# Patient Record
Sex: FEMALE | Race: WHITE | NOT HISPANIC OR LATINO | Employment: FULL TIME | URBAN - METROPOLITAN AREA
[De-identification: names, ages, dates, MRNs, and addresses within clinical notes are randomized per-mention and may not be internally consistent; named-entity substitution may affect disease eponyms.]

---

## 2017-01-02 ENCOUNTER — APPOINTMENT (OUTPATIENT)
Dept: RADIATION ONCOLOGY | Facility: CLINIC | Age: 52
End: 2017-01-02
Payer: COMMERCIAL

## 2017-01-02 PROCEDURE — 77412 RADIATION TX DELIVERY LVL 3: CPT | Performed by: RADIOLOGY

## 2017-01-03 ENCOUNTER — APPOINTMENT (OUTPATIENT)
Dept: RADIATION ONCOLOGY | Facility: CLINIC | Age: 52
End: 2017-01-03
Payer: COMMERCIAL

## 2017-01-03 PROCEDURE — 77412 RADIATION TX DELIVERY LVL 3: CPT | Performed by: RADIOLOGY

## 2017-01-04 ENCOUNTER — ONCOLOGY VISIT (OUTPATIENT)
Dept: RADIATION ONCOLOGY | Facility: CLINIC | Age: 52
End: 2017-01-04

## 2017-01-04 ENCOUNTER — APPOINTMENT (OUTPATIENT)
Dept: RADIATION ONCOLOGY | Facility: CLINIC | Age: 52
End: 2017-01-04
Payer: COMMERCIAL

## 2017-01-04 DIAGNOSIS — Z71.81 SPIRITUAL OR RELIGIOUS COUNSELING: Primary | ICD-10-CM

## 2017-01-04 PROCEDURE — 77412 RADIATION TX DELIVERY LVL 3: CPT | Performed by: RADIOLOGY

## 2017-01-04 PROCEDURE — 77336 RADIATION PHYSICS CONSULT: CPT | Performed by: RADIOLOGY

## 2017-01-04 PROCEDURE — 77427 RADIATION TX MANAGEMENT X5: CPT | Performed by: RADIOLOGY

## 2017-01-04 NOTE — PROGRESS NOTES
SPIRITUAL HEALTH SERVICES  SPIRITUAL ASSESSMENT Progress Note  [facility]     introduced himself to Rizwana Aguirre and informed her of his availability.    Marc Gonzales M.Div., [facility]  Staff   Office tel: [phone]

## 2017-01-05 ENCOUNTER — OFFICE VISIT (OUTPATIENT)
Dept: RADIATION ONCOLOGY | Facility: CLINIC | Age: 52
End: 2017-01-05
Payer: COMMERCIAL

## 2017-01-05 ENCOUNTER — APPOINTMENT (OUTPATIENT)
Dept: RADIATION ONCOLOGY | Facility: CLINIC | Age: 52
End: 2017-01-05
Payer: COMMERCIAL

## 2017-01-05 VITALS — BODY MASS INDEX: 35.92 KG/M2 | WEIGHT: 190 LBS

## 2017-01-05 DIAGNOSIS — D05.12 DUCTAL CARCINOMA IN SITU (DCIS) OF LEFT BREAST: Primary | ICD-10-CM

## 2017-01-05 PROCEDURE — 99207 ZZC NO BILLABLE SERVICE THIS VISIT: CPT | Performed by: RADIOLOGY

## 2017-01-05 PROCEDURE — 77412 RADIATION TX DELIVERY LVL 3: CPT | Performed by: RADIOLOGY

## 2017-01-05 PROCEDURE — 77417 THER RADIOLOGY PORT IMAGE(S): CPT | Performed by: RADIOLOGY

## 2017-01-05 ASSESSMENT — PAIN SCALES - GENERAL: PAINLEVEL: NO PAIN (0)

## 2017-01-05 NOTE — PROGRESS NOTES
[facility] PHYSICIANS  SPECIALIZING IN BREAKTHROUGHS  Radiation Oncology    On Treatment Visit Note      Rizwana Aguirre      Date: 2017   MRN: [id number]   : 1965  Diagnosis: DCIS      Reason for Visit:  On Radiation Treatment Visit     Treatment Summary to Date  Treatment Site: left breast Current Dose: 1602/4255 cGy Fractions:       Chemotherapy  Chemo concurrent with radx?: No (Dr. Beltrán)    Subjective: Doing well. No issues this week. Notices some slight tenderness of breast. No other concerns.    Nursing ROS:   Nutrition Alteration  Diet Type: Patient's Preference           Cardiovascular  Respiratory effort: 1 - Normal - without distress           Pain Assessment  0-10 Pain Scale: 0    Objective:   Wt 190 lb  LMP 2016 (Approximate)  Gen: Appears well, NAD  Skin: Minimal erythema    Labs:  CBC RESULTS:   Recent Labs   Lab Test  16   0817   WBC  6.0   RBC  4.39   HGB  13.6   HCT  41.5   MCV  95   MCH  31.0   MCHC  32.8   RDW  12.7   PLT  252     ELECTROLYTES:  Recent Labs   Lab Test  16   0817   NA  139   POTASSIUM  4.2   CHLORIDE  106   VANDANA  8.9   CO2  28   BUN  18   CR  0.69   GLC  94       Assessment:    Tolerating radiation therapy well.  All questions and concerns addressed.    Plan:   1. Continue current therapy.    2. Discussed NSAIDS, cool packs for breast tenderness.      Mosaiq chart and setup information reviewed  Port images reviewed    Medication Review  Med list reviewed with patient?: Yes  Med list printed and given: Offered and declined    Educational Topic Discussed  Additional Instructions: patient performing daily ROM stretching exercises, reviewed per patient request today, denies concerns      Kelton Valdes M.D.  [facility]  Radiation Oncology    [phone] [facility]  [phone] pager

## 2017-01-05 NOTE — PATIENT INSTRUCTIONS
Please contact [facility] RN with questions or concerns following today's appointment: [phone].    Thank you!

## 2017-01-06 ENCOUNTER — APPOINTMENT (OUTPATIENT)
Dept: RADIATION ONCOLOGY | Facility: CLINIC | Age: 52
End: 2017-01-06
Payer: COMMERCIAL

## 2017-01-06 PROCEDURE — 77412 RADIATION TX DELIVERY LVL 3: CPT | Performed by: RADIOLOGY

## 2017-01-09 ENCOUNTER — ONCOLOGY VISIT (OUTPATIENT)
Dept: ONCOLOGY | Facility: CLINIC | Age: 52
End: 2017-01-09
Payer: COMMERCIAL

## 2017-01-09 DIAGNOSIS — D05.12 DUCTAL CARCINOMA IN SITU (DCIS) OF LEFT BREAST: Primary | ICD-10-CM

## 2017-01-09 DIAGNOSIS — Z80.8 FAMILY HISTORY OF MELANOMA: ICD-10-CM

## 2017-01-09 PROCEDURE — 99207 ZZC NO CHARGE LOS: CPT | Performed by: GENETIC COUNSELOR, MS

## 2017-01-09 PROCEDURE — 77412 RADIATION TX DELIVERY LVL 3: CPT | Performed by: RADIOLOGY

## 2017-01-09 NOTE — PROGRESS NOTES
1/9/2017    Referring Provider: Livia Frederick MD    Presenting Information:  Rizwana Aguirre returned to the Cancer Risk Management Program at the [facility] to discuss her genetic testing results. Her blood was drawn on 11/30/2016.  BRCAplus-Expanded testing was ordered from [employer]. This testing was done because of her personal history of breast cancer.  I spoke to Rizwana by phone on 12/12/2016 to discuss her normal/negative BRCA1 and BRCA2 genetic testing results (please see phone note for details).  These results were reported separately for upcoming treatment/surgical decision making.     Genetic Testing Result: NEGATIVE  Rizwana is negative for mutations in PAZ, BRCA1, BRCA2, CDH1, CHEK2, PALB2, PTEN, and TP53 by sequencing and deletion/duplication analysis. No mutations were found in any of the eight genes analyzed.     She does not have an identified mutation associated with the following syndromes: Hereditary Breast and Ovarian Cancer Syndrome (BRCA1, BRCA2), Li Fraumeni Syndrome (TP53), Hereditary Diffuse Gastric Cancer (CDH1), or Cowden Syndrome (PTEN).    Interpretation:  We discussed several different interpretations of this negative test result.    1. One explanation may be that there is a different gene or combination of genes and environment that are associated with Rizwana's diagnosis of breast cancer.    2. There is also a small possibility that there is a mutation in one of these genes and we could not find it with our current testing methods.       Screening:  Based on this negative test result, it is important for Rizwana and her relatives to refer back to the family history for appropriate cancer screening.      We discussed that Rizwana's close female relatives remain at increased risk for breast cancer given their family history.  We discussed that her daughters should eventually talk to their physicians about early breast screening starting by age 39 years.    Current recommendations are that  mammograms for Rizwana's close female relatives begin 10 years prior to the youngest age of diagnosis, or at age 40, whichever comes first.  Breast screening should include monthly self-breast exam, annual or semi-annual clinical breast exam, and annual mammogram.  These screening options should be discussed with their care providers, to determine what screening is appropriate, or if additional screening (such as breast MRI) is necessary based on personal/family history factors.  We discussed that these recommendations may very well change before her daughters reach this age, so it will be good for them to discuss this periodically with their physicians.  In addition, Rizwana's close female relatives (including her sister) may benefit from genetic counseling to determine if they meet criteria for high risk breast cancer screening based on personal and family history factors.  This screening includes annual breast MRI in addition to annual mammogram alternating every 6 months.      Rizwana and her sister should consider speaking with their primary care providers regarding safe skin habits, and report any concerning skin findings.  Due to Rizwana's family history of melanoma, the American Cancer Society encourages regular skin exams by a dermatologist, thorough skin self-examinations once a month, skin protection (i.e. sun screen)and avoidance of artificial UV rays.     Final screening recommendations for Rizwana and her relatives should be made by each individual's managing physician. Other age appropriate cancer screening, including the recommendations by the American Cancer Society, are appropriate for Rizwana and her family at this time.  These screening recommendations may change if there are changes to Rizwana's personal and/or family history.     Inheritance:  We reviewed autosomal dominant inheritance and the fact that mutations do not skip generations. Because Rizwana does not have an identifiable mutation in the 8 genes, she did  not pass on a mutation in these genes to her children.    Additional Testing:  It is possible Rizwana does carry a gene or combination of genes and environment that increased her risk for breast cancer. We again reviewed the option of larger gene panels covering more moderate risk genes associated with breast cancer. Rizwana felt comfortable declining further genetic testing at this time.     Summary:  We do not have an explanation for her breast cancer.  Because of that, it is important that she continue with cancer screening based on her personal and family history as discussed above.    Genetic testing is rapidly advancing, and new cancer susceptibility genes will most likely be identified in the future.  Therefore, I encouraged Rizwana to contact me annually or if there are changes in her personal or family history.  This may change how we assess her cancer risk, screening, and the testing we would offer.    Plan:  1.  I provided Rizwana with a copy of her test results today.    2. She plans to follow-up with her care providers.  3. She should contact me annually, or sooner if her family history changes.    If Rizwana has any further questions, I encouraged her to contact me at [phone].    Time spent face to face: 10 minutes.    Meggan Teran MS, [facility]  Certified Genetic Counselor  [phone]

## 2017-01-09 NOTE — Clinical Note
Cancer Risk Management  Program Locations    [facility]  [facility]  Mailing Address  Cancer Risk Management Program  [facility]  [address]  [place] [address]    New patient appointments  [phone]  January 9, 2017    Rizwana Aguirre  [address] [address]      Dear Rizwana,    It was a pleasure meeting with you at [facility] on 1/9/2017.  Here is a copy of the progress note from your recent genetic counseling visit to the Cancer Risk Management Program.  If you have any additional questions, please feel free to call.    Referring Provider: Livia Frederick MD    Presenting Information:  Rizwana Aguirre returned to the Cancer Risk Management Program at the [facility] to discuss her genetic testing results. Her blood was drawn on 11/30/2016.  BRCAplus-Expanded testing was ordered from [employer]. This testing was done because of her personal history of breast cancer.  I spoke to Rizwana by phone on 12/12/2016 to discuss her normal/negative BRCA1 and BRCA2 genetic testing results (please see phone note for details).  These results were reported separately for upcoming treatment/surgical decision making.     Genetic Testing Result: NEGATIVE  Rizwana is negative for mutations in PAZ, BRCA1, BRCA2, CDH1, CHEK2, PALB2, PTEN, and TP53 by sequencing and deletion/duplication analysis. No mutations were found in any of the eight genes analyzed.     She does not have an identified mutation associated with the following syndromes: Hereditary Breast and Ovarian Cancer Syndrome (BRCA1, BRCA2), Li Fraumeni Syndrome (TP53), Hereditary Diffuse Gastric Cancer (CDH1), or Cowden Syndrome (PTEN).    Interpretation:  We discussed several different interpretations of this negative test result.    1. One  explanation may be that there is a different gene or combination of genes and environment that are associated with Rizwana's diagnosis of breast cancer.    2. There is also a small possibility that there is a mutation in one of these genes and we could not find it with our current testing methods.       Screening:  Based on this negative test result, it is important for Rizwana and her relatives to refer back to the family history for appropriate cancer screening.      We discussed that Rizwana's close female relatives remain at increased risk for breast cancer given their family history.  We discussed that her daughters should eventually talk to their physicians about early breast screening starting by age 39 years.    Current recommendations are that mammograms for Rizwana's close female relatives begin 10 years prior to the youngest age of diagnosis, or at age 40, whichever comes first.  Breast screening should include monthly self-breast exam, annual or semi-annual clinical breast exam, and annual mammogram.  These screening options should be discussed with their care providers, to determine what screening is appropriate, or if additional screening (such as breast MRI) is necessary based on personal/family history factors.  We discussed that these recommendations may very well change before her daughters reach this age, so it will be good for them to discuss this periodically with their physicians.  In addition, Rizwana's close female relatives (including her sister) may benefit from genetic counseling to determine if they meet criteria for high risk breast cancer screening based on personal and family history factors.  This screening includes annual breast MRI in addition to annual mammogram alternating every 6 months.      Rizwana and her sister should consider speaking with their primary care providers regarding safe skin habits, and report any concerning skin findings.  Due to Rizwana's family history of melanoma, the American  Cancer Society encourages regular skin exams by a dermatologist, thorough skin self-examinations once a month, skin protection (i.e. sun screen)and avoidance of artificial UV rays.     Final screening recommendations for Rizwana and her relatives should be made by each individual's managing physician. Other age appropriate cancer screening, including the recommendations by the American Cancer Society, are appropriate for Rizwana and her family at this time.  These screening recommendations may change if there are changes to Rizwana's personal and/or family history.     Inheritance:  We reviewed autosomal dominant inheritance and the fact that mutations do not skip generations. Because Rizwana does not have an identifiable mutation in the 8 genes, she did not pass on a mutation in these genes to her children.    Additional Testing:  It is possible Rizwana does carry a gene or combination of genes and environment that increased her risk for breast cancer. We again reviewed the option of larger gene panels covering more moderate risk genes associated with breast cancer. Rizwana felt comfortable declining further genetic testing at this time.     Summary:  We do not have an explanation for her breast cancer.  Because of that, it is important that she continue with cancer screening based on her personal and family history as discussed above.    Genetic testing is rapidly advancing, and new cancer susceptibility genes will most likely be identified in the future.  Therefore, I encouraged Rizwana to contact me annually or if there are changes in her personal or family history.  This may change how we assess her cancer risk, screening, and the testing we would offer.    Plan:  1.  I provided Rizwana with a copy of her test results today.    2. She plans to follow-up with her care providers.  3. She should contact me annually, or sooner if her family history changes.    If Rizwana has any further questions, I encouraged her to contact me at  [phone].    Meggan Teran MS, [facility]  Certified Genetic Counselor  [phone]

## 2017-01-10 ENCOUNTER — APPOINTMENT (OUTPATIENT)
Dept: RADIATION ONCOLOGY | Facility: CLINIC | Age: 52
End: 2017-01-10
Payer: COMMERCIAL

## 2017-01-10 PROCEDURE — 77412 RADIATION TX DELIVERY LVL 3: CPT | Performed by: RADIOLOGY

## 2017-01-11 ENCOUNTER — OFFICE VISIT (OUTPATIENT)
Dept: RADIATION ONCOLOGY | Facility: CLINIC | Age: 52
End: 2017-01-11
Payer: COMMERCIAL

## 2017-01-11 ENCOUNTER — APPOINTMENT (OUTPATIENT)
Dept: RADIATION ONCOLOGY | Facility: CLINIC | Age: 52
End: 2017-01-11
Payer: COMMERCIAL

## 2017-01-11 VITALS — WEIGHT: 190 LBS | BODY MASS INDEX: 35.92 KG/M2

## 2017-01-11 DIAGNOSIS — D05.12 DUCTAL CARCINOMA IN SITU (DCIS) OF LEFT BREAST: Primary | ICD-10-CM

## 2017-01-11 PROCEDURE — 77336 RADIATION PHYSICS CONSULT: CPT | Performed by: RADIOLOGY

## 2017-01-11 PROCEDURE — 77412 RADIATION TX DELIVERY LVL 3: CPT | Performed by: RADIOLOGY

## 2017-01-11 PROCEDURE — 77427 RADIATION TX MANAGEMENT X5: CPT | Performed by: RADIOLOGY

## 2017-01-11 PROCEDURE — 99207 ZZC NO BILLABLE SERVICE THIS VISIT: CPT | Performed by: RADIOLOGY

## 2017-01-11 NOTE — PROGRESS NOTES
[facility] PHYSICIANS  SPECIALIZING IN BREAKTHROUGHS  Radiation Oncology    On Treatment Visit Note      Rizwana Aguirre      Date: 2017   MRN: [id number]   : 1965  Diagnosis: DCIS      Reason for Visit:  On Radiation Treatment Visit     Treatment Summary to Date  Treatment Site: left breast Current Dose: 2670/4255 cGy Fractions: 10/20        Chemotherapy  Chemo concurrent with radx?: No (Dr. Beltrán)    Nursing ROS:   Nutrition Alteration  Diet Type: Patient's Preference  Skin  Skin Reaction: 1 - Faint erythema or dry desquamation  Skin Intervention:  (Eucerin)        Cardiovascular  Respiratory effort: 1 - Normal - without distress        Psychosocial  Mood - Anxiety: 0 - Normal  Mood - Depression: 0 - Normal  Pyschosocial Note: Energy is declining (fatigue is starting)  Pain Assessment  0-10 Pain Scale: 1  Pain Descriptors: Dull    Subjective: See nursing ROS.  Patient c/o of hot flashes.  She was taken off birth control in December.  Discussed that hot flashes are probably related to nola-menopausal symptoms and being taken off birth control.    Objective:   Wt 190 lb  LMP 2016 (Approximate)  Gen:  Patient looks well.  Skin: Mild erythema just starting especially at lateral portion of breast.  Grade 1 skin reaction with faint erythema.    Labs:  CBC RESULTS:   Recent Labs   Lab Test  16   0817   WBC  6.0   RBC  4.39   HGB  13.6   HCT  41.5   MCV  95   MCH  31.0   MCHC  32.8   RDW  12.7   PLT  252     ELECTROLYTES:  Recent Labs   Lab Test  16   0817   NA  139   POTASSIUM  4.2   CHLORIDE  106   VANDANA  8.9   CO2  28   BUN  18   CR  0.69   GLC  94       Assessment:    Tolerating radiation therapy well.  All questions and concerns addressed.    Plan:   1. Continue current therapy.        Mosaiq chart and setup information reviewed  Images checked, dose and setup reviewed    Medication Review  Med list reviewed with patient?: Yes  Med list printed and given: Offered and  declined    Educational Topic Discussed  Additional Instructions: patient performing daily ROM stretching exercises, reviewed per patient request today, denies concerns    Pablo Penaloza MD  [facility]

## 2017-01-12 ENCOUNTER — APPOINTMENT (OUTPATIENT)
Dept: RADIATION ONCOLOGY | Facility: CLINIC | Age: 52
End: 2017-01-12
Payer: COMMERCIAL

## 2017-01-12 PROCEDURE — 77417 THER RADIOLOGY PORT IMAGE(S): CPT | Performed by: RADIOLOGY

## 2017-01-12 PROCEDURE — 77334 RADIATION TREATMENT AID(S): CPT | Performed by: RADIOLOGY

## 2017-01-12 PROCEDURE — 77307 TELETHX ISODOSE PLAN CPLX: CPT | Performed by: RADIOLOGY

## 2017-01-12 PROCEDURE — 77412 RADIATION TX DELIVERY LVL 3: CPT | Performed by: RADIOLOGY

## 2017-01-13 ENCOUNTER — APPOINTMENT (OUTPATIENT)
Dept: RADIATION ONCOLOGY | Facility: CLINIC | Age: 52
End: 2017-01-13
Payer: COMMERCIAL

## 2017-01-13 PROCEDURE — 77412 RADIATION TX DELIVERY LVL 3: CPT | Performed by: RADIOLOGY

## 2017-01-16 ENCOUNTER — APPOINTMENT (OUTPATIENT)
Dept: RADIATION ONCOLOGY | Facility: CLINIC | Age: 52
End: 2017-01-16
Payer: COMMERCIAL

## 2017-01-16 PROCEDURE — 77412 RADIATION TX DELIVERY LVL 3: CPT | Performed by: RADIOLOGY

## 2017-01-17 ENCOUNTER — APPOINTMENT (OUTPATIENT)
Dept: RADIATION ONCOLOGY | Facility: CLINIC | Age: 52
End: 2017-01-17
Payer: COMMERCIAL

## 2017-01-17 PROCEDURE — 77412 RADIATION TX DELIVERY LVL 3: CPT | Performed by: RADIOLOGY

## 2017-01-17 PROCEDURE — 77331 SPECIAL RADIATION DOSIMETRY: CPT | Performed by: RADIOLOGY

## 2017-01-18 ENCOUNTER — APPOINTMENT (OUTPATIENT)
Dept: RADIATION ONCOLOGY | Facility: CLINIC | Age: 52
End: 2017-01-18
Payer: COMMERCIAL

## 2017-01-18 PROCEDURE — 77280 THER RAD SIMULAJ FIELD SMPL: CPT | Performed by: RADIOLOGY

## 2017-01-18 PROCEDURE — 77336 RADIATION PHYSICS CONSULT: CPT | Mod: 59 | Performed by: RADIOLOGY

## 2017-01-18 PROCEDURE — 77412 RADIATION TX DELIVERY LVL 3: CPT | Performed by: RADIOLOGY

## 2017-01-19 ENCOUNTER — APPOINTMENT (OUTPATIENT)
Dept: RADIATION ONCOLOGY | Facility: CLINIC | Age: 52
End: 2017-01-19
Payer: COMMERCIAL

## 2017-01-19 ENCOUNTER — OFFICE VISIT (OUTPATIENT)
Dept: RADIATION ONCOLOGY | Facility: CLINIC | Age: 52
End: 2017-01-19
Payer: COMMERCIAL

## 2017-01-19 VITALS — BODY MASS INDEX: 36.11 KG/M2 | WEIGHT: 191 LBS

## 2017-01-19 DIAGNOSIS — D05.12 DUCTAL CARCINOMA IN SITU (DCIS) OF LEFT BREAST: Primary | ICD-10-CM

## 2017-01-19 PROCEDURE — 99207 ZZC NO BILLABLE SERVICE THIS VISIT: CPT | Performed by: RADIOLOGY

## 2017-01-19 PROCEDURE — 77412 RADIATION TX DELIVERY LVL 3: CPT | Performed by: RADIOLOGY

## 2017-01-19 ASSESSMENT — PAIN SCALES - GENERAL: PAINLEVEL: NO PAIN (0)

## 2017-01-19 NOTE — PATIENT INSTRUCTIONS
Please contact [facility] RN with questions or concerns following today's appointment: [phone].    Thank you!

## 2017-01-19 NOTE — PROGRESS NOTES
[facility] PHYSICIANS  SPECIALIZING IN BREAKTHROUGHS  Radiation Oncology    On Treatment Visit Note      Rizwana Aguirre      Date: 2017   MRN: [id number]   : 1965  Diagnosis: DCIS      Reason for Visit:  On Radiation Treatment Visit     Treatment Summary to Date  Treatment Site: left breast Current Dose: 4255/5255 cGy Fractions:       Chemotherapy  Chemo concurrent with radx?: No (Dr. Beltrán)    Subjective: Doing well. Notices more skin reaction with some pruritis. No other concerns.    Nursing ROS:   Nutrition Alteration  Diet Type: Patient's Preference  Skin  Skin Reaction: 2 - Moderate to brisk erythema, patchy moist desquamation, mostly confined to skin folds and creases, moderate edema  Skin Intervention: patient using Eucerin cream daily  Skin Note: slight pruritis along mid chest, reviewed use of Hydrocortisone 1% cream as needed        Cardiovascular  Respiratory effort: 1 - Normal - without distress        Psychosocial  Mood - Anxiety: 0 - Normal  Mood - Depression: 0 - Normal  Pyschosocial Note: increasing fatigue  Pain Assessment  0-10 Pain Scale: 0    Objective:   Wt 191 lb  Gen: Appears well, NAD  Skin: Mild diffuse erythema over treatment field    Labs:  CBC RESULTS:   Recent Labs   Lab Test  16   0817   WBC  6.0   RBC  4.39   HGB  13.6   HCT  41.5   MCV  95   MCH  31.0   MCHC  32.8   RDW  12.7   PLT  252     ELECTROLYTES:  Recent Labs   Lab Test  16   0817   NA  139   POTASSIUM  4.2   CHLORIDE  106   VANDANA  8.9   CO2  28   BUN  18   CR  0.69   GLC  94       Assessment:    Tolerating radiation therapy well.  All questions and concerns addressed.    Plan:   1. Continue current therapy.    2. Dermatitis: Continue with moisturizer, discussed hydrocortisone prn for pruritis.      [employer] chart and setup information reviewed  Port images reviewed    Medication Review  Med list reviewed with patient?: Yes  Med list printed and given: Offered and declined    Educational  Topic Discussed  Additional Instructions: patient performing daily ROM stretching exercises, reviewed per patient request today, denies concerns      Kelton Valdes M.D.  [facility]  Radiation Oncology    [phone] [facility]  [phone] pager

## 2017-01-23 ENCOUNTER — APPOINTMENT (OUTPATIENT)
Dept: RADIATION ONCOLOGY | Facility: CLINIC | Age: 52
End: 2017-01-23
Payer: COMMERCIAL

## 2017-01-23 PROCEDURE — 77412 RADIATION TX DELIVERY LVL 3: CPT | Performed by: RADIOLOGY

## 2017-01-24 ENCOUNTER — APPOINTMENT (OUTPATIENT)
Dept: RADIATION ONCOLOGY | Facility: CLINIC | Age: 52
End: 2017-01-24
Payer: COMMERCIAL

## 2017-01-24 PROCEDURE — 77412 RADIATION TX DELIVERY LVL 3: CPT | Performed by: RADIOLOGY

## 2017-01-25 ENCOUNTER — APPOINTMENT (OUTPATIENT)
Dept: RADIATION ONCOLOGY | Facility: CLINIC | Age: 52
End: 2017-01-25
Payer: COMMERCIAL

## 2017-01-25 PROCEDURE — 77412 RADIATION TX DELIVERY LVL 3: CPT | Performed by: RADIOLOGY

## 2017-01-26 ENCOUNTER — OFFICE VISIT (OUTPATIENT)
Dept: RADIATION ONCOLOGY | Facility: CLINIC | Age: 52
End: 2017-01-26
Payer: COMMERCIAL

## 2017-01-26 ENCOUNTER — APPOINTMENT (OUTPATIENT)
Dept: RADIATION ONCOLOGY | Facility: CLINIC | Age: 52
End: 2017-01-26
Payer: COMMERCIAL

## 2017-01-26 VITALS — WEIGHT: 191 LBS | BODY MASS INDEX: 36.11 KG/M2

## 2017-01-26 DIAGNOSIS — D05.12 DUCTAL CARCINOMA IN SITU (DCIS) OF LEFT BREAST: Primary | ICD-10-CM

## 2017-01-26 PROCEDURE — 99207 ZZC NO BILLABLE SERVICE THIS VISIT: CPT | Performed by: RADIOLOGY

## 2017-01-26 PROCEDURE — 77336 RADIATION PHYSICS CONSULT: CPT | Performed by: RADIOLOGY

## 2017-01-26 PROCEDURE — 77412 RADIATION TX DELIVERY LVL 3: CPT | Performed by: RADIOLOGY

## 2017-01-26 PROCEDURE — 77427 RADIATION TX MANAGEMENT X5: CPT | Performed by: RADIOLOGY

## 2017-01-26 ASSESSMENT — PAIN SCALES - GENERAL: PAINLEVEL: MILD PAIN (3)

## 2017-01-26 NOTE — PROGRESS NOTES
[facility] PHYSICIANS  SPECIALIZING IN BREAKTHROUGHS  Radiation Oncology    On Treatment Visit Note      Rizwana Aguirre      Date: 2017   MRN: [id number]   : 1965  Diagnosis: DCIS      Reason for Visit:  On Radiation Treatment Visit     Treatment Summary to Date  Treatment Site: left breast Current Dose: 5255/5255 cGy Fractions: 20      Chemotherapy  Chemo concurrent with radx?: No (Dr. Beltrán)    Subjective: Finished today.  Notices some more peeling along axilla that is tender.  Using hydrocortisone for pruritis.    Nursing ROS:   Nutrition Alteration  Diet Type: Patient's Preference  Skin  Skin Reaction: 2 - Moderate to brisk erythema, patchy moist desquamation, mostly confined to skin folds and creases, moderate edema  Skin Intervention: patient using Eucerin cream daily  Skin Note: slight pruritis along mid chest, reviewed use of Hydrocortisone 1% cream as needed        Cardiovascular  Respiratory effort: 1 - Normal - without distress        Psychosocial  Mood - Anxiety: 0 - Normal  Mood - Depression: 0 - Normal  Pyschosocial Note: increasing fatigue  Pain Assessment  0-10 Pain Scale: 0    Objective:   Wt 191 lb  Gen: Appears well, NAD  Skin: Mild diffuse erythema over treatment field, ~ 3 x 3 cm area of desquamation along axilla.    Labs:  CBC RESULTS:   Recent Labs   Lab Test  16   0817   WBC  6.0   RBC  4.39   HGB  13.6   HCT  41.5   MCV  95   MCH  31.0   MCHC  32.8   RDW  12.7   PLT  252     ELECTROLYTES:  Recent Labs   Lab Test  16   0817   NA  139   POTASSIUM  4.2   CHLORIDE  106   VANDANA  8.9   CO2  28   BUN  18   CR  0.69   GLC  94       Assessment:    Tolerating radiation therapy well.  All questions and concerns addressed.    Plan:   1. Finished radiation today. Will f/u with us in 1 month and has f/u with Dr. Beltrán scheduled.  2. Dermatitis: Continue with moisturizer, hydrocortisone. Will start mepilex and gave sample today. Discussed NSAIDs prn and consider morphine  gel.       Mosaiq chart and setup information reviewed  Port images reviewed    Medication Review  Med list reviewed with patient?: Yes  Med list printed and given: Offered and declined    Educational Topic Discussed  Additional Instructions: patient performing daily ROM stretching exercises, reviewed per patient request today, denies concerns      Kelton Valdes M.D.  [facility]  Radiation Oncology    [phone] [facility]  [phone] pager

## 2017-01-30 ENCOUNTER — ONCOLOGY VISIT (OUTPATIENT)
Dept: RADIATION ONCOLOGY | Facility: CLINIC | Age: 52
End: 2017-01-30

## 2017-02-03 ENCOUNTER — OFFICE VISIT (OUTPATIENT)
Dept: FAMILY MEDICINE | Facility: CLINIC | Age: 52
End: 2017-02-03
Payer: COMMERCIAL

## 2017-02-03 VITALS
HEIGHT: 61 IN | HEART RATE: 63 BPM | TEMPERATURE: 97.3 F | DIASTOLIC BLOOD PRESSURE: 88 MMHG | OXYGEN SATURATION: 100 % | SYSTOLIC BLOOD PRESSURE: 125 MMHG | BODY MASS INDEX: 36.06 KG/M2 | WEIGHT: 191 LBS

## 2017-02-03 DIAGNOSIS — Z12.11 SCREEN FOR COLON CANCER: ICD-10-CM

## 2017-02-03 DIAGNOSIS — Z71.84 TRAVEL ADVICE ENCOUNTER: Primary | ICD-10-CM

## 2017-02-03 PROCEDURE — 90632 HEPA VACCINE ADULT IM: CPT | Performed by: PHYSICIAN ASSISTANT

## 2017-02-03 PROCEDURE — 90715 TDAP VACCINE 7 YRS/> IM: CPT | Performed by: PHYSICIAN ASSISTANT

## 2017-02-03 PROCEDURE — 90471 IMMUNIZATION ADMIN: CPT | Performed by: PHYSICIAN ASSISTANT

## 2017-02-03 PROCEDURE — 99213 OFFICE O/P EST LOW 20 MIN: CPT | Mod: 25 | Performed by: PHYSICIAN ASSISTANT

## 2017-02-03 PROCEDURE — 90472 IMMUNIZATION ADMIN EACH ADD: CPT | Performed by: PHYSICIAN ASSISTANT

## 2017-02-03 RX ORDER — CIPROFLOXACIN 500 MG/1
500 TABLET, FILM COATED ORAL 2 TIMES DAILY
Qty: 6 TABLET | Refills: 0 | Status: SHIPPED | OUTPATIENT
Start: 2017-02-03 | End: 2017-02-22

## 2017-02-03 RX ORDER — SCOLOPAMINE TRANSDERMAL SYSTEM 1 MG/1
PATCH, EXTENDED RELEASE TRANSDERMAL
Qty: 2 PATCH | Refills: 0 | Status: SHIPPED
Start: 2017-02-03 | End: 2017-02-22

## 2017-02-03 NOTE — MR AVS SNAPSHOT
After Visit Summary   2/3/2017    Rizwana Aguirre    MRN: [id number]           Patient Information     Date Of Birth          1965        Visit Information        Provider Department      2/3/2017 7:40 AM Indira Aldrich PA-C [facility]        Today's Diagnoses     Screen for colon cancer    -  1     Travel advice encounter           Care Instructions    Based on your medical history and these are the current health maintenance or preventive care services that you are due for (some may have been done at this visit)  Health Maintenance Due   Topic Date Due     COLON CANCER SCREEN (SYSTEM ASSIGNED)  12/19/2016         At [facility], we strive to deliver an exceptional experience to you, every time we see you.    If you receive a survey in the mail, please send us back your thoughts. We really do value your feedback.    Your care team's suggested websites for health information:  Www.Parachute.Tradesparq : Up to date and easily searchable information on multiple topics.  Www.medlineplus.gov : medication info, interactive tutorials, watch real surgeries online  Www.familydoctor.org : good info from the Academy of Family Physicians  Www.cdc.gov : public health info, travel advisories, epidemics (H1N1)  Www.aap.org : children's health info, normal development, vaccinations  Www.health.[facility].[place].us : [place] dept of health, public health issues in [place], N1N1    How to contact your care team:   Lety Guerra/Abhijit [phone]         Pharmacy [phone]    Dr. Mitchell, Joselyn Bravo PA-C, Dr. Marie, Samira Guillermo APRN CNP, Indira Aldrich PA-C, Dr. Muhammad, and ANTONIO Romero CNP    Team RNs: Tasha & Laurel      Clinic hours  M-Th 7 am-7 pm   Fri 7 am-5 pm.   Urgent care M-F 11 am-9 pm,   Sat/Sun 9 am-5 pm.  Pharmacy M-Th 8 am-8 pm Fri 8 am-6 pm  Sat/Sun 9 am-5 pm.     All password changes, disabled accounts, or ID changes in MyChart/MyHealth will be done by  our Access Services Department.    If you need help with your account or password, call: [phone]. Clinic staff no longer has the ability to change passwords.             Follow-ups after your visit        Additional Services     TRAVEL CLINIC REFERRAL       Your provider has referred you to the [facility] - Please call [phone] to schedule an appointment.   Fax number: [phone]    Please be aware that coverage of these services is subject to the terms and limitations of your health insurance plans.  Call member services at your health plan with any benefit coverage questions.                  Your next 10 appointments already scheduled     Feb 22, 2017  9:00 AM   Return Visit with ANTONIO Marquez [facility])    [address] [place] [id number] [phone]            Mar 07, 2017  2:15 PM   LAB with [facility]    [id number] [place] [id number] [phone]           Patient must bring picture ID.  Patient should be prepared to give a urine specimen  Please do not eat 10-12 hours before your appointment if you are coming in fasting for labs on lipids, cholesterol, or glucose (sugar).  Pregnant women should follow their Care Team instructions. Water with medications is okay. Do not drink coffee or other fluids.   If you have concerns about taking  your medications, please ask at office or if scheduling via YEVVO, send a message by clicking on Secure Messaging, Message Your Care Team.            Mar 07, 2017  2:45 PM   Return Visit with Samantha Beltrán MD   [facility])    [address] [address] [place] [address] [phone]              Who to contact     If you have questions or need follow up information about today's clinic visit or your schedule please contact  "[facility] directly at [phone].  Normal or non-critical lab and imaging results will be communicated to you by MyChart, letter or phone within 4 business days after the clinic has received the results. If you do not hear from us within 7 days, please contact the clinic through ExecNotehart or phone. If you have a critical or abnormal lab result, we will notify you by phone as soon as possible.  Submit refill requests through [employer] or call your pharmacy and they will forward the refill request to us. Please allow 3 business days for your refill to be completed.          Additional Information About Your Visit        ExecNotehart Information     [employer] gives you secure access to your electronic health record. If you see a primary care provider, you can also send messages to your care team and make appointments. If you have questions, please call your primary care clinic.  If you do not have a primary care provider, please call [phone] and they will assist you.        Care EveryWhere ID     This is your Care EveryWhere ID. This could be used by other organizations to access your [facility] medical records  [id number]        Your Vitals Were     Pulse Temperature Height BMI (Body Mass Index) Pulse Oximetry Last Period    63 97.3  F (36.3  C) (Oral) 5' 1\" (1.549 m) 36.11 kg/m2 100% 11/30/2016    Breastfeeding?                   No            Blood Pressure from Last 3 Encounters:   02/03/17 125/88   12/14/16 136/82   11/30/16 128/84    Weight from Last 3 Encounters:   02/03/17 191 lb (86.637 kg)   01/26/17 191 lb (86.637 kg)   01/19/17 191 lb (86.637 kg)              We Performed the Following     HEPATITIS A VACCINE (ADULT)     TDAP (ADACEL AGES 11-64)     TRAVEL CLINIC REFERRAL          Today's Medication Changes          These changes are accurate as of: 2/3/17  8:24 AM.  If you have any questions, ask your nurse or doctor.               Start taking these medicines.        Dose/Directions    " ciprofloxacin 500 MG tablet   Commonly known as:  CIPRO   Used for:  Travel advice encounter   Started by:  Indira Aldrich PA-C        Dose:  500 mg   Take 1 tablet (500 mg) by mouth 2 times daily   Quantity:  6 tablet   Refills:  0       scopolamine 72 hr patch   Commonly known as:  TRANSDERM   Used for:  Travel advice encounter   Started by:  Indira Aldrich PA-C        Place 1 patch onto the skin every 72 hours.  Apply to hairless area behind one ear at least 4 hours before travel.  Remove old patch and change every 3 days.   Quantity:  2 patch   Refills:  0            Where to get your medicines      These medications were sent to [facility] [place] - [place], [place] - [address] [address]  [address], [place] [address]     Phone:  [phone]    - ciprofloxacin 500 MG tablet  - scopolamine 72 hr patch             Primary Care Provider Office Phone # Fax #    Indira Aldrich PA-C [phone]       [facility] [address]  [facility] [address]        Thank you!     Thank you for choosing [facility]  for your care. Our goal is always to provide you with excellent care. Hearing back from our patients is one way we can continue to improve our services. Please take a few minutes to complete the written survey that you may receive in the mail after your visit with us. Thank you!             Your Updated Medication List - Protect others around you: Learn how to safely use, store and throw away your medicines at www.disposemymeds.org.          This list is accurate as of: 2/3/17  8:24 AM.  Always use your most recent med list.                   Brand Name Dispense Instructions for use    ciprofloxacin 500 MG tablet    CIPRO    6 tablet    Take 1 tablet (500 mg) by mouth 2 times daily       COQ-10 PO          hydrOXYzine 10 MG tablet    ATARAX    30 tablet    Take 1 tablet by mouth nightly as needed for itching.       lisinopril 10  MG tablet    PRINIVIL/ZESTRIL    90 tablet    TAKE ONE TABLET BY MOUTH EVERY DAY       olopatadine HCl 0.2 % Soln    PATADAY    1 Bottle    Place 1 drop into both eyes daily as needed       OMEGA-3 FISH OIL PO          scopolamine 72 hr patch    TRANSDERM    2 patch    Place 1 patch onto the skin every 72 hours.  Apply to hairless area behind one ear at least 4 hours before travel.  Remove old patch and change every 3 days.       SUMAtriptan 50 MG tablet    IMITREX    9 tablet    Take 1 tablet (50 mg) by mouth at onset of headache for migraine May repeat dose in 2 hours.  Do not exceed 200 mg in 24 hours       triamcinolone 0.1 % cream    KENALOG    30 g    Apply sparingly to affected area two times daily for 10-14 days.

## 2017-02-03 NOTE — PROGRESS NOTES
SUBJECTIVE:                                                    Rizwana Aguirre is a 51 year old female who presents to clinic today for the following health issues:      Pt is traveling to [facility] in couple days and would like to have all shots done today.  Hep A, Typhoid,   Patches for sea sickness (will be going sea fishing) and RX for Cipro.            Problem list and histories reviewed & adjusted, as indicated.  Additional history: as documented    Patient Active Problem List   Diagnosis     HTN (hypertension), benign     CARDIOVASCULAR SCREENING; LDL GOAL LESS THAN 160     Hypertension goal BP (blood pressure) < 140/90     Vitamin D deficiency     Pruritus     Hyperphoria     Lesion of external ear     Classic migraine with aura     Urinary incontinence, unspecified incontinence type     Functional urinary incontinence     Atopic dermatitis, unspecified type     Atypical ductal hyperplasia of left breast     Mild aortic insufficiency     Non-rheumatic mitral regurgitation     Ductal carcinoma in situ (DCIS) of left breast     Past Surgical History   Procedure Laterality Date     C  delivery only  x2     Biopsy breast needle localization Left 2016     Procedure: BIOPSY BREAST NEEDLE LOCALIZATION;  Surgeon: Livia Frederick MD;  Location: [facility] OR     Breast biopsy, core rt/lt Left 10/12/2016       Social History   Substance Use Topics     Smoking status: Never Smoker      Smokeless tobacco: Never Used     Alcohol Use: 0.0 oz/week      Comment: Social     Family History   Problem Relation Age of Onset     C.A.D. Mother      C.A.D. Father      Glaucoma No family hx of      Macular Degeneration No family hx of      DIABETES No family hx of      Hypertension Mother      CEREBROVASCULAR DISEASE Maternal Grandmother      Thyroid Disease No family hx of      CANCER Father 40     Melanoma     Hypertension Father      Other Cancer Father      melanoma         Current Outpatient Prescriptions   Medication Sig  "Dispense Refill     ciprofloxacin (CIPRO) 500 MG tablet Take 1 tablet (500 mg) by mouth 2 times daily 6 tablet 0     scopolamine (TRANSDERM) 72 hr patch Place 1 patch onto the skin every 72 hours.  Apply to hairless area behind one ear at least 4 hours before travel.  Remove old patch and change every 3 days. 2 patch 0     triamcinolone (KENALOG) 0.1 % cream Apply sparingly to affected area two times daily for 10-14 days. 30 g 1     lisinopril (PRINIVIL,ZESTRIL) 10 MG tablet TAKE ONE TABLET BY MOUTH EVERY DAY 90 tablet 1     Omega-3 Fatty Acids (OMEGA-3 FISH OIL PO)        Coenzyme Q10 (COQ-10 PO)        olopatadine HCl (PATADAY) 0.2 % SOLN Place 1 drop into both eyes daily as needed 1 Bottle 6     SUMAtriptan (IMITREX) 50 MG tablet Take 1 tablet (50 mg) by mouth at onset of headache for migraine May repeat dose in 2 hours.  Do not exceed 200 mg in 24 hours 9 tablet 1     hydrOXYzine (ATARAX) 10 MG tablet Take 1 tablet by mouth nightly as needed for itching. 30 tablet 6     BP Readings from Last 3 Encounters:   02/03/17 125/88   12/14/16 136/82   11/30/16 128/84    Wt Readings from Last 3 Encounters:   02/03/17 191 lb (86.637 kg)   01/26/17 191 lb (86.637 kg)   01/19/17 191 lb (86.637 kg)                    ROS:  Constitutional, HEENT, cardiovascular, pulmonary, gi and gu systems are negative, except as otherwise noted.    OBJECTIVE:                                                    /88 mmHg  Pulse 63  Temp(Src) 97.3  F (36.3  C) (Oral)  Ht 5' 1\" (1.549 m)  Wt 191 lb (86.637 kg)  BMI 36.11 kg/m2  SpO2 100%  LMP 11/30/2016  Breastfeeding? No  Body mass index is 36.11 kg/(m^2).  GENERAL: healthy, alert and no distress    Diagnostic Test Results:  none      ASSESSMENT/PLAN:                                                        1. Travel advice encounter    We do not have typhoid, she may f/u with travel clinic to have that done.   Completed shots due.    Cipro for travelers diarrhea, I recommend she " drink bottled water.   Scopolamine patch to be applied the night before her sea fishing excursion (they leave at 6:30 am).   - TDAP (ADACEL AGES 11-64)  - HEPATITIS A VACCINE (ADULT)  - ciprofloxacin (CIPRO) 500 MG tablet; Take 1 tablet (500 mg) by mouth 2 times daily  Dispense: 6 tablet; Refill: 0  - TRAVEL CLINIC REFERRAL  - scopolamine (TRANSDERM) 72 hr patch; Place 1 patch onto the skin every 72 hours.  Apply to hairless area behind one ear at least 4 hours before travel.  Remove old patch and change every 3 days.  Dispense: 2 patch; Refill: 0    2. Screen for colon cancer  I discussed the importance of colorectal cancer screening, including the risks and benefits of the various procedures, including colonoscopy and annual FOB immunoassay test.  Patient education materials given during examination today on colon cancer screening.   Patient has opted to do colonoscopy, will message me when she returns.          FUTURE APPOINTMENTS:       - Follow-up visit as needed.    Indira Aldrich PA-C  [facility]

## 2017-02-03 NOTE — NURSING NOTE
"Chief Complaint   Patient presents with     Other     Pt is [place] outside [place] in couple [place] and would like to have all shots done today.       Initial /88 mmHg  Pulse 63  Temp(Src) 97.3  F (36.3  C) (Oral)  Ht 5' 1\" (1.549 m)  Wt 191 lb (86.637 kg)  BMI 36.11 kg/m2  SpO2 100%  LMP 12/14/2016  Breastfeeding? No Estimated body mass index is 36.11 kg/(m^2) as calculated from the following:    Height as of this encounter: 5' 1\" (1.549 m).    Weight as of this encounter: 191 lb (86.637 kg).  BP completed using cuff size: large  An,CMA      "

## 2017-02-03 NOTE — PATIENT INSTRUCTIONS
Based on your medical history and these are the current health maintenance or preventive care services that you are due for (some may have been done at this visit)  Health Maintenance Due   Topic Date Due     COLON CANCER SCREEN (SYSTEM ASSIGNED)  12/19/2016         At [facility], we strive to deliver an exceptional experience to you, every time we see you.    If you receive a survey in the mail, please send us back your thoughts. We really do value your feedback.    Your care team's suggested websites for health information:  Www.Tagwhat.org : Up to date and easily searchable information on multiple topics.  Www.medlineplus.gov : medication info, interactive tutorials, watch real surgeries online  Www.familydoctor.org : good info from the Academy of Family Physicians  Www.cdc.gov : public health info, travel advisories, epidemics (H1N1)  Www.aap.org : children's health info, normal development, vaccinations  Www.health.[facility].mn.us : [place] dept of health, public health issues in [place], N1N1    How to contact your care team:   Team Mari/Spirit [phone]         Pharmacy [phone]    Dr. Mitchell, Joselyn Bravo PA-C, Dr. Marie, Samira ALVAREZ CNP, Indira Aldrich PA-C, Dr. Muhammad, and ANTONIO Romero CNP    Team RNs: Tasha Barragan      Clinic hours  M-Th 7 am-7 pm   Fri 7 am-5 pm.   Urgent care M-F 11 am-9 pm,   Sat/Sun 9 am-5 pm.  Pharmacy M-Th 8 am-8 pm Fri 8 am-6 pm  Sat/Sun 9 am-5 pm.     All password changes, disabled accounts, or ID changes in EnglishUp/MyHealth will be done by our Access Services Department.    If you need help with your account or password, call: [phone]. Clinic staff no longer has the ability to change passwords.

## 2017-02-03 NOTE — NURSING NOTE
Screening Questionnaire for Adult Immunization    Are you sick today?   No   Do you have allergies to medications, food, a vaccine component or latex?   No   Have you ever had a serious reaction after receiving a vaccination?   No   Do you have a long-term health problem with heart disease, lung disease, asthma, kidney disease, metabolic disease (e.g. diabetes), anemia, or other blood disorder?   No   Do you have cancer, leukemia, HIV/AIDS, or any other immune system problem?   No   In the past 3 months, have you taken medications that affect  your immune system, such as prednisone, other steroids, or anticancer drugs; drugs for the treatment of rheumatoid arthritis, Crohn s disease, or psoriasis; or have you had radiation treatments?   No   Have you had a seizure, or a brain or other nervous system problem?   No   During the past year, have you received a transfusion of blood or blood     products, or been given immune (gamma) globulin or antiviral drug?   No   For women: Are you pregnant or is there a chance you could become        pregnant during the next month?   No   Have you received any vaccinations in the past 4 weeks?   No     Immunization questionnaire answers were all negative.      MNVFC doesn't apply on this patient    Per orders of ASHWINI Aldrich, injection of Hep A & Adacel given by Paola Peters. Patient instructed to remain in clinic for 20 minutes afterwards, and to report any adverse reaction to me immediately.       Screening performed by Paola Peters on 2/3/2017 at 8:30 AM.

## 2017-02-17 NOTE — PROCEDURES
Radiotherapy Treatment Summary          Date of Report: 2017     PATIENT: DAISY LEÓN  MEDICAL RECORD NO:  [id number]  : 1965     DIAGNOSIS: D05.12 Intraductal carcinoma in situ of left breast  INTENT OF RADIOTHERAPY: Cure  PATHOLOGY:  Ductal carcinoma in situ of the left breast, ER/NC+                                  STAGE:  Pathologic stage 0 (Tis NX MX).          Ductal carcinoma in situ of the left breast.  The patient underwent a wire localized lumpectomy.  She has a pathologic   stage 0 (Tis NX MX).  Her DCIS is grade 2, ER/NC positive.  Now has completed adjuvant radiotherapy.                         Details of the treatments summarized below are found in records kept in the Department of Radiation Oncology at [facility].  Treatment Summary:  Radiation Oncology - Course: 1    Treatment Site          Dose                Modality From To Elapsed Days Fx.  1 Lt_Breast            4,005 cGy    10x/18x 12/29/2016  2017  20 15  1 Lt_Breast_Bst     1,250 cGy    e18  2017   7  5          Dose per Fraction:        Total Dose:        267 cGy /fraction to left breast                       5,255 cGy  250 cGy/ fraction for boost      COMMENTS:                      Patient experienced grade 2 skin reaction managed well with moisturizer, mepilex, and hydrocortisone.  Patient   experienced mild fatigue related to radiation therapy.     PAIN MANAGEMENT:      Patient managed pain with over the counter analgesics.  Pain was well controlled.           FOLLOW UP PLAN:  Patient will follow up in Radiation Oncology in 1 month or sooner if any concerns.  Patient has an appointment   with medical oncologist, Dr. Beltrán for follow up.                     Staff Physician: Kelton Valdes M.D.  Physicist: Livia Mosquera MS              Radiation Oncology [address] [place] [id number] Phone: [phone]

## 2017-02-22 ENCOUNTER — OFFICE VISIT (OUTPATIENT)
Dept: RADIATION ONCOLOGY | Facility: CLINIC | Age: 52
End: 2017-02-22
Payer: COMMERCIAL

## 2017-02-22 VITALS
DIASTOLIC BLOOD PRESSURE: 99 MMHG | TEMPERATURE: 98.2 F | HEIGHT: 61 IN | RESPIRATION RATE: 20 BRPM | BODY MASS INDEX: 36.44 KG/M2 | WEIGHT: 193 LBS | SYSTOLIC BLOOD PRESSURE: 148 MMHG | HEART RATE: 61 BPM | OXYGEN SATURATION: 98 %

## 2017-02-22 DIAGNOSIS — D05.12 DUCTAL CARCINOMA IN SITU (DCIS) OF LEFT BREAST: Primary | ICD-10-CM

## 2017-02-22 PROCEDURE — 99024 POSTOP FOLLOW-UP VISIT: CPT | Performed by: NURSE PRACTITIONER

## 2017-02-22 ASSESSMENT — PAIN SCALES - GENERAL: PAINLEVEL: NO PAIN (0)

## 2017-02-22 NOTE — PROGRESS NOTES
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    RADIATION THERAPY DELIVERED:   4,005 to left breast in 15 fractions with 1,250 boost 5 fractions for total of 5,255 cGy    INTERVAL SINCE COMPLETION OF RADIATION THERAPY:   1 month    SUBJECTIVE:   Rizwana Aguirre is a 51 year old female with is here today for routine 1 month follow up after completing radiation therapy.  She has seen healing of her skin reaction and has had resolution of her fatigue.  She has forgotten about doing range of motion exercises.  She denies any shortness of breath or cough.    She just returned from a great trip to [facility].    She has seen Dr. Beltrán in medical oncology and she has decided to not take Tamoxifen.  She will meet again with Dr. Beltrán on 3/7/17.    ROS:  Complete review of systems is negative except for symptoms discussed in subjective above.    Current Outpatient Prescriptions   Medication     lisinopril (PRINIVIL,ZESTRIL) 10 MG tablet     Omega-3 Fatty Acids (OMEGA-3 FISH OIL PO)     Coenzyme Q10 (COQ-10 PO)     olopatadine HCl (PATADAY) 0.2 % SOLN     triamcinolone (KENALOG) 0.1 % cream     SUMAtriptan (IMITREX) 50 MG tablet     hydrOXYzine (ATARAX) 10 MG tablet     No current facility-administered medications for this visit.           Allergies   Allergen Reactions     Cantaloupe [Melon]      Scratchy throat     Shrimp Nausea     Walnuts [Nuts]      Scratchy throat       Past Medical History   Diagnosis Date     Aortic insufficiency 10/16/09     Trace - per ECHO     DCIS (ductal carcinoma in situ) 2016     Left breast     Heart murmur      HTN (hypertension), benign      Hyperphoria 2013     Kidney stones      Migraines      Mitral insufficiency 10/16/09     Mild - per ECHO     Seasonal allergic rhinitis      Uncomplicated asthma          PHYSICAL  "EXAM:  BP (!) 148/99 (BP Location: Right leg, Patient Position: Chair, Cuff Size: Adult Regular)  Pulse 61  Temp 98.2  F (36.8  C) (Oral)  Resp 20  Ht 5' 1\"  Wt 193 lb  LMP 11/30/2016  SpO2 98%  BMI 36.47 kg/m2  Gen: Alert, in NAD  Pulm: No wheezing, stridor or respiratory distress  CV: Well-perfused, no cyanosis, no pedal edema  Skin: Normal color and turgor  Psychiatric: Appropriate mood and affect  BREAST:  Skin reaction has healed nicely.  No masses or discharge bilaterally.    LABS AND IMAGING:  Reviewed.    IMPRESSION:   Ms. Aguirre is a 51 year old female with an intraductal carcinoma in situ of the right breast, ER/MO+, pathologic stage 0 (Tis NX MX).    PLAN:   Patient has recovered nicely from acute side effects of radiation therapy.  Discussed long term care with continued moisturizing of the treated breast, stretching and range of motion exercises, sun screen, and the rare possibility of interstitial pneumonitis.    Patient will follow up with medical oncology for continued care and imaging.  She will follow up here as needed.  Discussed to come in with any concerns or questions that may develop.      CESAR Espinoza [facility]    "

## 2017-02-22 NOTE — MR AVS SNAPSHOT
After Visit Summary   2/22/2017    Rizwana Aguirre    MRN: [id number]           Patient Information     Date Of Birth          1965        Visit Information        Provider Department      2/22/2017 9:00 AM Joanne Quigley APRN CNP [facility]        Today's Diagnoses     Ductal carcinoma in situ (DCIS) of left breast    -  1       Follow-ups after your visit        Your next 10 appointments already scheduled     Mar 07, 2017  2:15 PM CST   LAB with LAB ONC [facility] ([facility])    [address] [place] [id number] [phone]           Patient must bring picture ID.  Patient should be prepared to give a urine specimen  Please do not eat 10-12 hours before your appointment if you are coming in fasting for labs on lipids, cholesterol, or glucose (sugar).  Pregnant women should follow their Care Team instructions. Water with medications is okay. Do not drink coffee or other fluids.   If you have concerns about taking  your medications, please ask at office or if scheduling via YOGITECH, send a message by clicking on Secure Messaging, Message Your Care Team.            Mar 07, 2017  2:45 PM CST   Return Visit with Samantha Beltrán MD   [facility])    [address] [address] [place] [id number] [phone]              Who to contact     If you have questions or need follow up information about today's clinic visit or your schedule please contact [facility] directly at [phone].  Normal or non-critical lab and imaging results will be communicated to you by MyChart, letter or phone within 4 business days after the clinic has received the results. If you do not hear from us within 7 days, please contact the clinic through MyChart or phone. If you have a critical or abnormal lab result, we will notify you by phone as soon as possible.  Submit refill  "requests through SensorWave or call your pharmacy and they will forward the refill request to us. Please allow 3 business days for your refill to be completed.          Additional Information About Your Visit        CENXhart Information     SensorWave gives you secure access to your electronic health record. If you see a primary care provider, you can also send messages to your care team and make appointments. If you have questions, please call your primary care clinic.  If you do not have a primary care provider, please call [phone] and they will assist you.      SensorWave is an electronic gateway that provides easy, online access to your medical records. With SensorWave, you can request a clinic appointment, read your test results, renew a prescription or communicate with your care team.     To access your existing account, please contact your [facility] or call [phone] for assistance.        Care EveryWhere ID     This is your Care EveryWhere ID. This could be used by other organizations to access your [facility] medical records  [id number]        Your Vitals Were     Pulse Temperature Respirations Height Last Period Pulse Oximetry    61 98.2  F (36.8  C) (Oral) 20 5' 1\" 11/30/2016 98%    BMI (Body Mass Index)                   36.47 kg/m2            Blood Pressure from Last 3 Encounters:   02/22/17 (!) 148/99   02/03/17 125/88   12/14/16 136/82    Weight from Last 3 Encounters:   02/22/17 193 lb   02/03/17 191 lb   01/26/17 191 lb              Today, you had the following     No orders found for display       Primary Care Provider Office Phone # Fax #    Indira Aldrich PA-C [phone]       [facility] [address]  [facility] [address]        Thank you!     Thank you for choosing [facility]  for your care. Our goal is always to provide you with excellent care. Hearing back from our patients is one way we can continue to improve " our services. Please take a few minutes to complete the written survey that you may receive in the mail after your visit with us. Thank you!             Your Updated Medication List - Protect others around you: Learn how to safely use, store and throw away your medicines at www.disposemymeds.org.          This list is accurate as of: 2/22/17 10:54 AM.  Always use your most recent med list.                   Brand Name Dispense Instructions for use    COQ-10 PO      Take 2 tablets by mouth       hydrOXYzine 10 MG tablet    ATARAX    30 tablet    Take 1 tablet by mouth nightly as needed for itching.       lisinopril 10 MG tablet    PRINIVIL/ZESTRIL    90 tablet    TAKE ONE TABLET BY MOUTH EVERY DAY       olopatadine HCl 0.2 % Soln    PATADAY    1 Bottle    Place 1 drop into both eyes daily as needed       OMEGA-3 FISH OIL PO      Take 2 g by mouth daily       SUMAtriptan 50 MG tablet    IMITREX    9 tablet    Take 1 tablet (50 mg) by mouth at onset of headache for migraine May repeat dose in 2 hours.  Do not exceed 200 mg in 24 hours       triamcinolone 0.1 % cream    KENALOG    30 g    Apply sparingly to affected area two times daily for 10-14 days.

## 2017-03-03 ENCOUNTER — DOCUMENTATION ONLY (OUTPATIENT)
Dept: LAB | Facility: CLINIC | Age: 52
End: 2017-03-03

## 2017-03-06 ENCOUNTER — MYC MEDICAL ADVICE (OUTPATIENT)
Dept: FAMILY MEDICINE | Facility: CLINIC | Age: 52
End: 2017-03-06

## 2017-03-06 DIAGNOSIS — Z12.11 COLON CANCER SCREENING: Primary | ICD-10-CM

## 2017-03-06 NOTE — TELEPHONE ENCOUNTER
Called and left a voicemail message to call the clinic to schedule  The appointment.  Shelyb Kumar MA/  [employer]

## 2017-03-07 ENCOUNTER — ONCOLOGY VISIT (OUTPATIENT)
Dept: ONCOLOGY | Facility: CLINIC | Age: 52
End: 2017-03-07
Payer: COMMERCIAL

## 2017-03-07 VITALS
HEART RATE: 63 BPM | DIASTOLIC BLOOD PRESSURE: 65 MMHG | WEIGHT: 194 LBS | OXYGEN SATURATION: 96 % | BODY MASS INDEX: 36.63 KG/M2 | HEIGHT: 61 IN | RESPIRATION RATE: 15 BRPM | SYSTOLIC BLOOD PRESSURE: 130 MMHG

## 2017-03-07 DIAGNOSIS — D05.12 DUCTAL CARCINOMA IN SITU (DCIS) OF LEFT BREAST: Primary | ICD-10-CM

## 2017-03-07 PROCEDURE — 99214 OFFICE O/P EST MOD 30 MIN: CPT | Performed by: INTERNAL MEDICINE

## 2017-03-07 ASSESSMENT — PAIN SCALES - GENERAL: PAINLEVEL: NO PAIN (0)

## 2017-03-07 NOTE — PROGRESS NOTES
Oncology Follow up visit:  Date on this visit: 3/7/2017      DIAGNOSIS   DCIS    Primary Physician: Indira Aldrich     History Of Present Illness:  Please refer to my previous note for details.    Copied and updated from prior    Rizwana is a very pleasant 50-year-old female who had a screening mammogram done in 09/2016 which showed a possibility of focal asymmetry in the mid left breast for which she had a diagnostic left-sided mammogram with tomosynthesis along with a left breast ultrasound which demonstrated a solid, hypoechoic mass in the left breast at the 9 o'clock position retroareolar measuring 1.3 cm in greatest dimension.  She had an ultrasound-guided biopsy done on 10/12/2016 which showed atypical ductal hyperplasia.  She then met with Dr. Frederick on 10/17/2016 and had a left-sided lumpectomy done on 11/14/2016 which showed DCIS measuring 7 mm in greatest dimension, nuclear grade 2, cribriform and papillary types, without any invasive carcinoma, and all the margins were negative for DCIS.  The nearest margin was 2.5 mm.  The estrogen (20%) and progesterone receptor (25%) were positive.    On prior visit, we discussed about adjuvant Tamoxifen but after understanding all the risks/benefits, she decided against taking it. She was taking OCPs and I recommended to stop those.   She completed adjuvant radiation to the breast for a total of 5,255 cGy on 1/26/17    She comes in today accompanied by her .  She tells me that she has been doing really well with good energy.  She has not noticed any new lumps, bumps or swellings.  She has not noticed any pain.  She tolerated radiation really well.  She has been doing well without any interval infections.  She has been able to eat and drink well without any GI problems.  She has no trouble breathing.  She has not lost any weight.  She is trying to keep herself active but is not exercising regularly but plans to start next week.  She tells me that after her visit  with me last time she stopped birth control pills and since then she has not had a period.  She has occasional hot flashes, but nothing very bothersome.       REVIEW OF SYSTEMS:  Otherwise, a comprehensive review of the systems was negative.     I reviewed other hx as below    She had menarche at the age of 13.  She became pregnant for the first time at the age of 33 and then at the age of 35, and both resulted in normal, healthy babies.  She has been taking oral combined oral contraceptive pills off and on for the last 30 years and she stopped it in 2016.  She is pre-menopausal.  She did breastfeed.         Past Medical/Surgical History:  Past Medical History   Diagnosis Date     Aortic insufficiency 10/16/09     Trace - per ECHO     DCIS (ductal carcinoma in situ) 2016     Left breast     Heart murmur      HTN (hypertension), benign      Hyperphoria 2013     Kidney stones      Migraines      Mitral insufficiency 10/16/09     Mild - per ECHO     Seasonal allergic rhinitis      Uncomplicated asthma      Past Surgical History   Procedure Laterality Date     C  delivery only  x2     Biopsy breast needle localization Left 2016     Procedure: BIOPSY BREAST NEEDLE LOCALIZATION;  Surgeon: Livia Frederick MD;  Location: [facility] OR     Breast biopsy, core rt/lt Left 10/12/2016     Cancer History:   As above    Allergies:  Allergies as of 2017 - Reinaldo as Reviewed 2017   Allergen Reaction Noted     Cantaloupe [melon]  2016     Shrimp Nausea 2016     Walnuts [nuts]  2016     Current Medications:  Current Outpatient Prescriptions   Medication Sig Dispense Refill     triamcinolone (KENALOG) 0.1 % cream Apply sparingly to affected area two times daily for 10-14 days. 30 g 1     lisinopril (PRINIVIL,ZESTRIL) 10 MG tablet TAKE ONE TABLET BY MOUTH EVERY DAY 90 tablet 1     Omega-3 Fatty Acids (OMEGA-3 FISH OIL PO) Take 2 g by mouth daily        Coenzyme Q10 (COQ-10 PO) Take 2  "tablets by mouth        olopatadine HCl (PATADAY) 0.2 % SOLN Place 1 drop into both eyes daily as needed 1 Bottle 6     SUMAtriptan (IMITREX) 50 MG tablet Take 1 tablet (50 mg) by mouth at onset of headache for migraine May repeat dose in 2 hours.  Do not exceed 200 mg in 24 hours 9 tablet 1     hydrOXYzine (ATARAX) 10 MG tablet Take 1 tablet by mouth nightly as needed for itching. 30 tablet 6      Family History:  Family History   Problem Relation Age of Onset     C.A.D. Mother      C.A.D. Father      Glaucoma No family hx of      Macular Degeneration No family hx of      DIABETES No family hx of      Hypertension Mother      CEREBROVASCULAR DISEASE Maternal Grandmother      Thyroid Disease No family hx of      CANCER Father 40     Melanoma     Hypertension Father      Other Cancer Father      melanoma   No history of cancers in the family.       Social History:  Social History     Social History     Marital status:      Spouse name: N/A     Number of children: N/A     Years of education: N/A     Occupational History      [employer]     Social History Main Topics     Smoking status: Never Smoker     Smokeless tobacco: Never Used     Alcohol use 0.0 oz/week      Comment: Social     Drug use: No     Sexual activity: Yes     Partners: Male     Birth control/ protection: Condom     Other Topics Concern     Parent/Sibling W/ Cabg, Mi Or Angioplasty Before 65f 55m? No     Social History Narrative   She has been a longtime nonsmoker.  She drinks alcohol occasionally.  She works as a manager in the training department.  She lives with her .       Physical Exam:  /65  Pulse 63  Resp 15  Ht 1.549 m (5' 0.98\")  Wt 88 kg (194 lb)  SpO2 96%  BMI 36.68 kg/m2  CONSTITUTIONAL: no acute distress  EYES: PERRLA, no palor or icterus.   ENT/MOUTH: no mouth lesions. Oropharynx normal  CVS: s1s2 no m r g .   RESPIRATORY: clear to auscultation b/l  GI: soft non tender no hepatosplenomegaly  NEURO: AAOX3  Grossly non focal " neuro exam  INTEGUMENT: no obvious rashes  LYMPHATIC: no palpable cervical, supraclavicular, axillary or inguinal LAD  MUSCULOSKELETAL: Unremarkable. No bony tenderness.   EXTREMITIES: no edema  PSYCH: Mentation, mood and affect are normal. Decision making capacity is intact  Breast exam was  done in the presence of a chaperone.  The surgical scar on the left side which is periareolar has healed well.  The skin is now minimally hyperpigmented.  I was unable to feel any abnormality on the left side.  There are no areas of firmness underlying the surgical scar, and the breast exam was pretty much unremarkable.  The nipple is everted.  On the right side, there is no palpable abnormality.  The nipple is everted.         ASSESSMENT/PLAN:    DCIS-, nuclear grade 2, cribriform and papillary types, measuring 7 mm in greatest dimension.  All margins are negative with the closest margin being 2.5 mm from DCIS.  There is associated atypical ductal hyperplasia. It is ER and MD positive.    She completed adjuvant radiation around the end of 01/2017 as mentioned above.  On a prior visit we discussed about adjuvant tamoxifen, but she was not interested in that.  She was taking OCPs at that time and she has now stopped them.  We again discussed what DCIS means and we discussed that its surveillance going forward would consist of annual mammograms.  She would be due for the annual screening mammogram in 09/2017.  I also encouraged her to do self breast exams regularly so that if she notices any change then she should seek prompt medical attention.        We again discussed the importance of regular aerobic exercise and I recommended at least 150 minutes of aerobic exercise every week which has shown to have very a beneficial effect in patients who have had invasive cancer.       She had genetic testing done and was negative as below   Rizwana is negative for mutations in PAZ, BRCA1, BRCA2, CDH1, CHEK2, PALB2, PTEN, and TP53 by  sequencing and deletion/duplication analysis. No mutations were found in any of the eight genes analyzed.      I answered all her questions to her satisfaction and I would like to see her back in Sept after the mammogram.  She will let us know in the interim if she has any questions or concerns      Samantha Beltrán

## 2017-03-07 NOTE — MR AVS SNAPSHOT
After Visit Summary   3/7/2017    Rizwana Aguirre    MRN: [id number]           Patient Information     Date Of Birth          1965        Visit Information        Provider Department      3/7/2017 2:45 PM Samantha Beltrán MD [facility]        Today's Diagnoses     Ductal carcinoma in situ (DCIS) of left breast    -  1      Care Instructions    Mammogram in September and see me a few days after that        Follow-ups after your visit        Your next 10 appointments already scheduled     Apr 18, 2017   Procedure with William Charles Duane, MD   [facility] (--)    [address] [facility] [address]   [phone]            Sep 25, 2017  8:00 AM CDT   MA SCREENING DIGITAL BILATERAL with MGMA1, MG MA TECH   [facility] ([facility])    [address] [place] [id number] [phone]           Do not use any powder, lotion or deodorant under your arms or on your breast. If you do, we will ask you to remove it before your exam.  Wear comfortable, two-piece clothing.  If you have any allergies, tell your care team.  Bring any previous mammograms from other facilities or have them mailed to the breast center. This mammogram location, [facility], now offers 3D mammography. It doesn't replace a screening mammogram and can be done with a regular screening mammogram. It is optional and not all insurances will pay for it. 3D mammography is a special kind of mammogram that produces a three-dimensional image of the breast by using low dose-xrays. 3D allows the radiologist to see the breast tissue differently from 2D, which reduces the chance of repeat testing due to overlapping breast tissue. If you are interested in have a 3D mammogram, please check with your insurance before you arrive for your exam. 3D mammography is offered to all patients. On the day of your exam you will be asked to sign a form stating yes,  you wish to have 3D imaging or, no, you decline.            Sep 29, 2017  3:15 PM CDT   Return Visit with Samantha Beltrán MD   [facility]    [address] [place] [id number] [phone]              Future tests that were ordered for you today     Open Future Orders        Priority Expected Expires Ordered    MA Screening Digital Bilateral Routine 9/7/2017 3/7/2018 3/7/2017            Who to contact     If you have questions or need follow up information about today's clinic visit or your schedule please contact [facility] directly at [phone].  Normal or non-critical lab and imaging results will be communicated to you by [employer]Care Solutionshart, letter or phone within 4 business days after the clinic has received the results. If you do not hear from us within 7 days, please contact the clinic through [employer]hart or phone. If you have a critical or abnormal lab result, we will notify you by phone as soon as possible.  Submit refill requests through Yoogaia or call your pharmacy and they will forward the refill request to us. Please allow 3 business days for your refill to be completed.          Additional Information About Your Visit        NewCare Solutionshart Information     Yoogaia gives you secure access to your electronic health record. If you see a primary care provider, you can also send messages to your care team and make appointments. If you have questions, please call your primary care clinic.  If you do not have a primary care provider, please call [phone] and they will assist you.      Yoogaia is an electronic gateway that provides easy, online access to your medical records. With Yoogaia, you can request a clinic appointment, read your test results, renew a prescription or communicate with your care team.     To access your existing account, please contact your [facility] or call [phone] for assistance.       "  Care EveryWhere ID     This is your Care EveryWhere ID. This could be used by other organizations to access your [place] medical records  [id number]        Your Vitals Were     Pulse Respirations Height Pulse Oximetry BMI (Body Mass Index)       63 15 1.549 m (5' 0.98\") 96% 36.68 kg/m2        Blood Pressure from Last 3 Encounters:   03/07/17 130/65   02/22/17 (!) 148/99   02/03/17 125/88    Weight from Last 3 Encounters:   03/07/17 88 kg (194 lb)   02/22/17 87.5 kg (193 lb)   02/03/17 86.6 kg (191 lb)               Primary Care Provider Office Phone # Fax #    Indira Aldrich PA-C [phone]       [facility] [address]  [facility] [address]        Thank you!     Thank you for choosing [facility]  for your care. Our goal is always to provide you with excellent care. Hearing back from our patients is one way we can continue to improve our services. Please take a few minutes to complete the written survey that you may receive in the mail after your visit with us. Thank you!             Your Updated Medication List - Protect others around you: Learn how to safely use, store and throw away your medicines at www.disposemymeds.org.          This list is accurate as of: 3/7/17  3:16 PM.  Always use your most recent med list.                   Brand Name Dispense Instructions for use    COQ-10 PO      Take 2 tablets by mouth       hydrOXYzine 10 MG tablet    ATARAX    30 tablet    Take 1 tablet by mouth nightly as needed for itching.       lisinopril 10 MG tablet    PRINIVIL/ZESTRIL    90 tablet    TAKE ONE TABLET BY MOUTH EVERY DAY       olopatadine HCl 0.2 % Soln    PATADAY    1 Bottle    Place 1 drop into both eyes daily as needed       OMEGA-3 FISH OIL PO      Take 2 g by mouth daily       SUMAtriptan 50 MG tablet    IMITREX    9 tablet    Take 1 tablet (50 mg) by mouth at onset of headache for migraine May repeat dose in 2 hours.  Do not exceed 200 mg in 24 " hours       triamcinolone 0.1 % cream    KENALOG    30 g    Apply sparingly to affected area two times daily for 10-14 days.

## 2017-03-19 ENCOUNTER — MYC MEDICAL ADVICE (OUTPATIENT)
Dept: FAMILY MEDICINE | Facility: CLINIC | Age: 52
End: 2017-03-19

## 2017-03-19 ENCOUNTER — MYC REFILL (OUTPATIENT)
Dept: FAMILY MEDICINE | Facility: CLINIC | Age: 52
End: 2017-03-19

## 2017-03-19 DIAGNOSIS — I10 HYPERTENSION GOAL BP (BLOOD PRESSURE) < 140/90: ICD-10-CM

## 2017-03-19 RX ORDER — LISINOPRIL 10 MG/1
10 TABLET ORAL DAILY
Qty: 90 TABLET | Refills: 1 | Status: CANCELLED | OUTPATIENT
Start: 2017-03-19

## 2017-03-20 NOTE — TELEPHONE ENCOUNTER
Message from [employer]:  Original authorizing provider: ASHWINI Arndtie Timothy would like a refill of the following medications:  lisinopril (PRINIVIL,ZESTRIL) 10 MG tablet [Indira Aldrich PA-C]    [facility] [place] [facility] [address]    Comment:

## 2017-03-20 NOTE — TELEPHONE ENCOUNTER
lisinopril      Last Written Prescription Date: 11/28/16  Last Fill Quantity: 90, # refills: 1  Last Office Visit with G, P or [facility] prescribing provider: 2/3/17       Potassium   Date Value Ref Range Status   11/02/2016 4.2 3.4 - 5.3 mmol/L Final     Creatinine   Date Value Ref Range Status   11/02/2016 0.69 0.52 - 1.04 mg/dL Final     BP Readings from Last 3 Encounters:   03/07/17 130/65   02/22/17 (!) 148/99   02/03/17 125/88     Should have at pharmacy for .  Laurel Samson RN, [facility] Triage

## 2017-04-18 ENCOUNTER — SURGERY (OUTPATIENT)
Age: 52
End: 2017-04-18

## 2017-04-18 ENCOUNTER — HOSPITAL ENCOUNTER (OUTPATIENT)
Facility: AMBULATORY SURGERY CENTER | Age: 52
Discharge: HOME | End: 2017-04-18
Attending: INTERNAL MEDICINE | Admitting: INTERNAL MEDICINE
Payer: COMMERCIAL

## 2017-04-18 VITALS
SYSTOLIC BLOOD PRESSURE: 117 MMHG | RESPIRATION RATE: 16 BRPM | DIASTOLIC BLOOD PRESSURE: 68 MMHG | TEMPERATURE: 98.6 F | OXYGEN SATURATION: 96 %

## 2017-04-18 LAB — COLONOSCOPY: NORMAL

## 2017-04-18 PROCEDURE — 45378 DIAGNOSTIC COLONOSCOPY: CPT

## 2017-04-18 PROCEDURE — G8907 PT DOC NO EVENTS ON DISCHARG: HCPCS

## 2017-04-18 PROCEDURE — G8918 PT W/O PREOP ORDER IV AB PRO: HCPCS

## 2017-04-18 RX ORDER — ONDANSETRON 2 MG/ML
4 INJECTION INTRAMUSCULAR; INTRAVENOUS
Status: DISCONTINUED | OUTPATIENT
Start: 2017-04-18 | End: 2017-04-19 | Stop reason: HOSPADM

## 2017-04-18 RX ORDER — LIDOCAINE 40 MG/G
CREAM TOPICAL
Status: DISCONTINUED | OUTPATIENT
Start: 2017-04-18 | End: 2017-04-19 | Stop reason: HOSPADM

## 2017-04-18 RX ORDER — FENTANYL CITRATE 50 UG/ML
INJECTION, SOLUTION INTRAMUSCULAR; INTRAVENOUS PRN
Status: DISCONTINUED | OUTPATIENT
Start: 2017-04-18 | End: 2017-04-18 | Stop reason: HOSPADM

## 2017-04-18 RX ADMIN — FENTANYL CITRATE 100 MCG: 50 INJECTION, SOLUTION INTRAMUSCULAR; INTRAVENOUS at 08:24

## 2017-04-18 RX ADMIN — FENTANYL CITRATE 50 MCG: 50 INJECTION, SOLUTION INTRAMUSCULAR; INTRAVENOUS at 08:37

## 2017-06-06 ENCOUNTER — TELEPHONE (OUTPATIENT)
Dept: OPTOMETRY | Facility: CLINIC | Age: 52
End: 2017-06-06

## 2017-06-06 ENCOUNTER — OFFICE VISIT (OUTPATIENT)
Dept: OPTOMETRY | Facility: CLINIC | Age: 52
End: 2017-06-06
Payer: COMMERCIAL

## 2017-06-06 DIAGNOSIS — H10.13 ALLERGIC CONJUNCTIVITIS, BILATERAL: ICD-10-CM

## 2017-06-06 DIAGNOSIS — H52.13 MYOPIA, BILATERAL: Primary | ICD-10-CM

## 2017-06-06 DIAGNOSIS — H25.13 NUCLEAR SCLEROSIS, BILATERAL: ICD-10-CM

## 2017-06-06 DIAGNOSIS — H50.53 HYPERPHORIA: ICD-10-CM

## 2017-06-06 DIAGNOSIS — H52.4 PRESBYOPIA: ICD-10-CM

## 2017-06-06 PROCEDURE — 92014 COMPRE OPH EXAM EST PT 1/>: CPT | Performed by: OPTOMETRIST

## 2017-06-06 PROCEDURE — 92310 CONTACT LENS FITTING OU: CPT | Mod: GA | Performed by: OPTOMETRIST

## 2017-06-06 PROCEDURE — 92015 DETERMINE REFRACTIVE STATE: CPT | Performed by: OPTOMETRIST

## 2017-06-06 RX ORDER — OLOPATADINE HYDROCHLORIDE 2 MG/ML
1 SOLUTION/ DROPS OPHTHALMIC DAILY PRN
Qty: 1 BOTTLE | Refills: 6 | Status: SHIPPED | OUTPATIENT
Start: 2017-06-06 | End: 2018-06-19

## 2017-06-06 ASSESSMENT — REFRACTION_FINALRX
OS_VPRISM: 1.0
OD_VPRISM: 1.0

## 2017-06-06 ASSESSMENT — REFRACTION_MANIFEST
OS_ADD: +2.50
OS_CYLINDER: +0.25
OD_ADD: +2.50
OD_SPHERE: -5.75
OS_SPHERE: -6.00
OS_AXIS: 140

## 2017-06-06 ASSESSMENT — CUP TO DISC RATIO
OS_RATIO: 0.2
OD_RATIO: 0.2

## 2017-06-06 ASSESSMENT — REFRACTION_WEARINGRX
SPECS_TYPE: PAL
OS_VPRISM: 1.0
OD_ADD: +2.50
OS_CYLINDER: +0.25
OS_AXIS: 140
OD_VBASE: UP
OD_SPHERE: -6.25
OS_SPHERE: -6.00
OS_ADD: +2.50
OS_VBASE: DOWN
OD_VPRISM: 1.0

## 2017-06-06 ASSESSMENT — VISUAL ACUITY
OS_CC: 20/40
OS_CC: 20/20
OD_CC+: -1
OD_CC: 20/30
OD_CC: 20/40
METHOD: SNELLEN - LINEAR
CORRECTION_TYPE: CONTACTS

## 2017-06-06 ASSESSMENT — TONOMETRY
OS_IOP_MMHG: 9
IOP_METHOD: TONOPEN
OD_IOP_MMHG: 11

## 2017-06-06 ASSESSMENT — REFRACTION_CURRENTRX
OD_ADD: +2.00
OD_SPHERE: -5.50
OS_BASECURVE: 8.70
OS_BRAND: DAILIES AQUA COMFORT PLUS
OD_BASECURVE: 8.70
OD_BRAND: DAILIES AQUA COMFORT PLUS
OD_BASECURVE: 8.70
OD_DIAMETER: 14.0
OD_ADD: +2.00
OS_SPHERE: -5.25
OD_SPHERE: -5.25
OD_BRAND: DAILIES AQUA COMFORT PLUS
OS_DIAMETER: 14.0
OS_BASECURVE: 8.70
OS_SPHERE: -5.25
OS_DIAMETER: 14.0
OS_BRAND: DAILIES AQUA COMFORT PLUS
OD_DIAMETER: 14.0

## 2017-06-06 ASSESSMENT — CONF VISUAL FIELD
OD_NORMAL: 1
METHOD: COUNTING FINGERS
OS_NORMAL: 1

## 2017-06-06 ASSESSMENT — SLIT LAMP EXAM - LIDS
COMMENTS: NORMAL
COMMENTS: NORMAL

## 2017-06-06 ASSESSMENT — EXTERNAL EXAM - LEFT EYE: OS_EXAM: NORMAL

## 2017-06-06 ASSESSMENT — EXTERNAL EXAM - RIGHT EYE: OD_EXAM: NORMAL

## 2017-06-06 NOTE — PATIENT INSTRUCTIONS
Eyeglass prescription given.    Trial contact lenses will be ordered for .   Ok to order.  Send a message on [email or web address] if vision not comfortable and we will try a different brand of multifocal right eye.    Prescription for Pataday to be used once daily for itchy eyes.  Use as needed.    You have the start of mild cataracts.  You may notice some blurred vision or glare with night driving.  It is important that you wear good sunglasses to protect your eyes from the ultraviolet light from the sun.  I recommend that you return in 1 year for an eye exam unless there are any sudden changes in your vision.    Return in 1 year for a complete eye exam or sooner if needed.    Joshua Rothman, OD

## 2017-06-06 NOTE — MR AVS SNAPSHOT
After Visit Summary   6/6/2017    Rizwana Aguirre    MRN: [id number]           Patient Information     Date Of Birth          1965        Visit Information        Provider Department      6/6/2017 12:00 PM Joshua Rothman OD [facility]        Today's Diagnoses     Myopia, bilateral    -  1    Presbyopia        Hyperphoria        Allergic conjunctivitis, bilateral        Nuclear sclerosis, bilateral          Care Instructions    Eyeglass prescription given.    Trial contact lenses will be ordered for .   Ok to order.  Send a message on [employer] if vision not comfortable and we will try a different brand of multifocal right eye.    Prescription for Pataday to be used once daily for itchy eyes.  Use as needed.    You have the start of mild cataracts.  You may notice some blurred vision or glare with night driving.  It is important that you wear good sunglasses to protect your eyes from the ultraviolet light from the sun.  I recommend that you return in 1 year for an eye exam unless there are any sudden changes in your vision.    Return in 1 year for a complete eye exam or sooner if needed.    Joshua Rothman OD              Follow-ups after your visit        Follow-up notes from your care team     Return in about 1 year (around 6/6/2018) for Annual Visit.      Your next 10 appointments already scheduled     Sep 25, 2017  8:00 AM CDT   MA SCREENING DIGITAL BILATERAL with MGMA1, MG MA TECH   [facility]    [id number] [place] [address] [phone]           Do not use any powder, lotion or deodorant under your arms or on your breast. If you do, we will ask you to remove it before your exam.  Wear comfortable, two-piece clothing.  If you have any allergies, tell your care team.  Bring any previous mammograms from other facilities or have them mailed to the breast center. This mammogram location, [facility],  now offers 3D mammography. It doesn't replace a screening mammogram and can be done with a regular screening mammogram. It is optional and not all insurances will pay for it. 3D mammography is a special kind of mammogram that produces a three-dimensional image of the breast by using low dose-xrays. 3D allows the radiologist to see the breast tissue differently from 2D, which reduces the chance of repeat testing due to overlapping breast tissue. If you are interested in have a 3D mammogram, please check with your insurance before you arrive for your exam. 3D mammography is offered to all patients. On the day of your exam you will be asked to sign a form stating yes, you wish to have 3D imaging or, no, you decline.            Sep 29, 2017  3:15 PM CDT   Return Visit with Samantha Beltrán MD   [facility]    [address] [place] [address] [phone]              Who to contact     If you have questions or need follow up information about today's clinic visit or your schedule please contact [facility] directly at [phone].  Normal or non-critical lab and imaging results will be communicated to you by MyChart, letter or phone within 4 business days after the clinic has received the results. If you do not hear from us within 7 days, please contact the clinic through [employer] or phone. If you have a critical or abnormal lab result, we will notify you by phone as soon as possible.  Submit refill requests through [employer] or call your pharmacy and they will forward the refill request to us. Please allow 3 business days for your refill to be completed.          Additional Information About Your Visit        [employer] Information     [employer] gives you secure access to your electronic health record. If you see a primary care provider, you can also send messages to your care team and make appointments. If you have questions, please call your  primary care clinic.  If you do not have a primary care provider, please call [phone] and they will assist you.        Care EveryWhere ID     This is your Care EveryWhere ID. This could be used by other organizations to access your [facility] medical records  [id number]         Blood Pressure from Last 3 Encounters:   04/18/17 117/68   03/07/17 130/65   02/22/17 (!) 148/99    Weight from Last 3 Encounters:   03/07/17 88 kg (194 lb)   02/22/17 87.5 kg (193 lb)   02/03/17 86.6 kg (191 lb)              We Performed the Following     CONTACT LENS FITTING,BILAT     EYE EXAM (SIMPLE-NONBILLABLE)     REFRACTION          Where to get your medicines      [place] [facility] [place] [address]  [address], [facility] [address]     Phone:  [phone]     olopatadine HCl 0.2 % Soln          Primary Care Provider Office Phone # Fax #    Indira Aldrich PA-C [phone]       [facility] [address]  [facility] [address]        Thank you!     Thank you for choosing [facility]  for your care. Our goal is always to provide you with excellent care. Hearing back from our patients is one way we can continue to improve our services. Please take a few minutes to complete the written survey that you may receive in the mail after your visit with us. Thank you!             Your Updated Medication List - Protect others around you: Learn how to safely use, store and throw away your medicines at www.disposemymeds.org.          This list is accurate as of: 6/6/17  1:02 PM.  Always use your most recent med list.                   Brand Name Dispense Instructions for use    COQ-10 PO      Take 2 tablets by mouth       hydrOXYzine 10 MG tablet    ATARAX    30 tablet    Take 1 tablet by mouth nightly as needed for itching.       lisinopril 10 MG tablet    PRINIVIL/ZESTRIL    90 tablet    TAKE ONE TABLET BY MOUTH  EVERY DAY       olopatadine HCl 0.2 % Soln    PATADAY    1 Bottle    Place 1 drop into both eyes daily as needed       OMEGA-3 FISH OIL PO      Take 2 g by mouth daily       PROBIOTIC DAILY PO          SUMAtriptan 50 MG tablet    IMITREX    9 tablet    Take 1 tablet (50 mg) by mouth at onset of headache for migraine May repeat dose in 2 hours.  Do not exceed 200 mg in 24 hours       triamcinolone 0.1 % cream    KENALOG    30 g    Apply sparingly to affected area two times daily for 10-14 days.

## 2017-06-06 NOTE — TELEPHONE ENCOUNTER
Brand Base Curve Diameter Sphere Add Dist VA Near VA Centration Movement   Right Dailies Aqua Comfort Plus 8.70 14.0 -5.25 +2.00 20/50 20/80 Well-centered Loose   Left Dailies Aqua Comfort Plus 8.70 14.0 -5.25   20/20   Well-centered Adequate     Please order trials of final prescription for  at [facility] to order.     Sabi Ramirez ORDERED CONTACTS 6/6/2017.

## 2017-06-18 DIAGNOSIS — I10 HYPERTENSION GOAL BP (BLOOD PRESSURE) < 140/90: ICD-10-CM

## 2017-06-19 ENCOUNTER — MYC REFILL (OUTPATIENT)
Dept: FAMILY MEDICINE | Facility: CLINIC | Age: 52
End: 2017-06-19

## 2017-06-19 DIAGNOSIS — I10 HYPERTENSION GOAL BP (BLOOD PRESSURE) < 140/90: ICD-10-CM

## 2017-06-19 RX ORDER — LISINOPRIL 10 MG/1
10 TABLET ORAL DAILY
Qty: 90 TABLET | Refills: 1 | Status: SHIPPED | OUTPATIENT
Start: 2017-06-19 | End: 2017-10-11 | Stop reason: SINTOL

## 2017-06-19 NOTE — TELEPHONE ENCOUNTER
Prescription approved per [facility] Refill Protocol.  RD Laguerre, Clinical RN Francesca Hopkins.

## 2017-06-19 NOTE — TELEPHONE ENCOUNTER
lisinopril (PRINIVIL,ZESTRIL) 10 MG tablet      Last Written Prescription Date: 11/28/16  Last Fill Quantity: 90, # refills: 1  Last Office Visit with [facility], [facility] or [facility] prescribing provider: 02/03/17       Potassium   Date Value Ref Range Status   11/02/2016 4.2 3.4 - 5.3 mmol/L Final     Creatinine   Date Value Ref Range Status   11/02/2016 0.69 0.52 - 1.04 mg/dL Final     BP Readings from Last 3 Encounters:   04/18/17 117/68   03/07/17 130/65   02/22/17 (!) 148/99         Jordana Ward  [facility]

## 2017-06-19 NOTE — TELEPHONE ENCOUNTER
Message from [employer]:  Original authorizing provider: ASHWINI Arndt would like a refill of the following medications:  lisinopril (PRINIVIL,ZESTRIL) 10 MG tablet [Idnira Aldrich PA-C]    Preferred pharmacy: [facility], [place] - [address]    Comment:  I am out of Lisinopril. I called the pharmacy ([facility]) to renew, but it is out of renewals. Please connect with them today so I can pick it up. Thank you. ~ Rizwana Aguirre (Katie)

## 2017-06-20 RX ORDER — LISINOPRIL 10 MG/1
TABLET ORAL
Qty: 90 TABLET | Refills: 1 | OUTPATIENT
Start: 2017-06-20

## 2017-09-25 ENCOUNTER — RADIANT APPOINTMENT (OUTPATIENT)
Dept: MAMMOGRAPHY | Facility: CLINIC | Age: 52
End: 2017-09-25
Attending: INTERNAL MEDICINE
Payer: COMMERCIAL

## 2017-09-25 DIAGNOSIS — D05.12 DUCTAL CARCINOMA IN SITU (DCIS) OF LEFT BREAST: ICD-10-CM

## 2017-09-25 PROCEDURE — 77063 BREAST TOMOSYNTHESIS BI: CPT | Performed by: RADIOLOGY

## 2017-09-25 PROCEDURE — G0202 SCR MAMMO BI INCL CAD: HCPCS | Performed by: RADIOLOGY

## 2017-10-03 ENCOUNTER — ONCOLOGY VISIT (OUTPATIENT)
Dept: ONCOLOGY | Facility: CLINIC | Age: 52
End: 2017-10-03
Payer: COMMERCIAL

## 2017-10-03 VITALS
OXYGEN SATURATION: 98 % | SYSTOLIC BLOOD PRESSURE: 125 MMHG | TEMPERATURE: 97.8 F | DIASTOLIC BLOOD PRESSURE: 65 MMHG | WEIGHT: 206 LBS | HEART RATE: 60 BPM | BODY MASS INDEX: 38.89 KG/M2 | HEIGHT: 61 IN

## 2017-10-03 DIAGNOSIS — Z23 NEED FOR PROPHYLACTIC VACCINATION AND INOCULATION AGAINST INFLUENZA: Primary | ICD-10-CM

## 2017-10-03 PROCEDURE — 90686 IIV4 VACC NO PRSV 0.5 ML IM: CPT | Performed by: INTERNAL MEDICINE

## 2017-10-03 PROCEDURE — 99213 OFFICE O/P EST LOW 20 MIN: CPT | Mod: 25 | Performed by: INTERNAL MEDICINE

## 2017-10-03 ASSESSMENT — PAIN SCALES - GENERAL: PAINLEVEL: NO PAIN (0)

## 2017-10-03 NOTE — PROGRESS NOTES
Oncology Follow up visit:  Date on this visit: 10/3/2017        DIAGNOSIS   DCIS on the left side    Primary Physician: Indira Aldrich     History Of Present Illness:  Please refer to my previous note for details.    Copied and updated from prior    Rizwana is a very pleasant 50-year-old female who had a screening mammogram done in 09/2016 which showed a possibility of focal asymmetry in the mid left breast for which she had a diagnostic left-sided mammogram with tomosynthesis along with a left breast ultrasound which demonstrated a solid, hypoechoic mass in the left breast at the 9 o'clock position retroareolar measuring 1.3 cm in greatest dimension.  She had an ultrasound-guided biopsy done on 10/12/2016 which showed atypical ductal hyperplasia.  She then met with Dr. Frederick on 10/17/2016 and had a left-sided lumpectomy done on 11/14/2016 which showed DCIS measuring 7 mm in greatest dimension, nuclear grade 2, cribriform and papillary types, without any invasive carcinoma, and all the margins were negative for DCIS.  The nearest margin was 2.5 mm.  The estrogen (20%) and progesterone receptor (25%) were positive.    After extensive discussion she opted not to get adjuvant tamoxifen.   She was taking OCPs and I recommended to stop those.   She completed adjuvant radiation to the breast for a total of 5,255 cGy on 1/26/17    Interval history  She has been feeling well. She has had no significant issues over the last several months. She has not noticed any new lumps or bumps or swellings. Denies any pain. Her energy is good. She does me she exercises about 30 minutes 3 times a week. She has gained several pounds since last visit and attributes it to not following a very healthy diet. She denies any nausea vomiting diarrhea or constipation. Denies any trouble breathing.    ROS:  A comprehensive ROS was otherwise neg    I reviewed other hx as below    She had menarche at the age of 13.  She became pregnant for the first  time at the age of 33 and then at the age of 35, and both resulted in normal, healthy babies.  She has been taking oral combined oral contraceptive pills off and on for the last 30 years and she stopped it in 2016.  She is pre-menopausal.  She did breastfeed.         Past Medical/Surgical History:  Past Medical History:   Diagnosis Date     Aortic insufficiency 10/16/09    Trace - per ECHO     DCIS (ductal carcinoma in situ) 2016    Left breast     Heart murmur      HTN (hypertension), benign      Hyperphoria 2013     Kidney stones      Migraines      Mitral insufficiency 10/16/09    Mild - per ECHO     Motion sickness      Seasonal allergic rhinitis      Uncomplicated asthma      Past Surgical History:   Procedure Laterality Date     BIOPSY BREAST NEEDLE LOCALIZATION Left 2016    Procedure: BIOPSY BREAST NEEDLE LOCALIZATION;  Surgeon: Livia Frederick MD;  Location: [facility] OR     BREAST BIOPSY, CORE RT/LT Left 10/12/2016     C  DELIVERY ONLY  x2     COLONOSCOPY WITH CO2 INSUFFLATION N/A 2017    Procedure: COLONOSCOPY WITH CO2 INSUFFLATION;  BMI 36.11  Colon cancer screening  Referring Provider-Indira Aldrich  Pharmacy [facility]  Fax [phone]  ;  Surgeon: Duane, William Charles, MD;  Location: [facility] OR     Cancer History:   As above    Allergies:  Allergies as of 10/03/2017 - Reinaldo as Reviewed 10/03/2017   Allergen Reaction Noted     Cantaloupe [melon]  2016     Shrimp Nausea 2016     Walnuts [nuts]  2016     Current Medications:  Current Outpatient Prescriptions   Medication Sig Dispense Refill     lisinopril (PRINIVIL/ZESTRIL) 10 MG tablet Take 1 tablet (10 mg) by mouth daily 90 tablet 1     olopatadine HCl (PATADAY) 0.2 % SOLN Place 1 drop into both eyes daily as needed 1 Bottle 6     Probiotic Product (PROBIOTIC DAILY PO)        triamcinolone (KENALOG) 0.1 % cream Apply sparingly to affected area two times daily for 10-14 days. 30 g 1     Omega-3  "Fatty Acids (OMEGA-3 FISH OIL PO) Take 2 g by mouth daily        Coenzyme Q10 (COQ-10 PO) Take 2 tablets by mouth        SUMAtriptan (IMITREX) 50 MG tablet Take 1 tablet (50 mg) by mouth at onset of headache for migraine May repeat dose in 2 hours.  Do not exceed 200 mg in 24 hours (Patient not taking: Reported on 10/3/2017) 9 tablet 1     hydrOXYzine (ATARAX) 10 MG tablet Take 1 tablet by mouth nightly as needed for itching. (Patient not taking: Reported on 10/3/2017) 30 tablet 6      Family History:  Family History   Problem Relation Age of Onset     C.A.D. Mother      Hypertension Mother      C.A.D. Father      CANCER Father 40     Melanoma     Hypertension Father      Other Cancer Father      melanoma     CEREBROVASCULAR DISEASE Maternal Grandmother    No history of cancers in the family.       Social History:  Social History     Social History     Marital status:      Spouse name: N/A     Number of children: N/A     Years of education: N/A     Occupational History      [employer]     Social History Main Topics     Smoking status: Never Smoker     Smokeless tobacco: Never Used     Alcohol use 0.0 oz/week      Comment: Social     Drug use: No     Sexual activity: Yes     Partners: Male     Birth control/ protection: Condom     Other Topics Concern     Parent/Sibling W/ Cabg, Mi Or Angioplasty Before 65f 55m? No     Social History Narrative     No narrative on file   She has been a longtime nonsmoker.  She drinks alcohol occasionally.  She works as a manager in the training department.  She lives with her .       Physical Exam:  /65  Pulse 60  Temp 97.8  F (36.6  C)  Ht 1.549 m (5' 0.98\")  Wt 93.4 kg (206 lb)  SpO2 98%  BMI 38.94 kg/m2  CONSTITUTIONAL: No apparent distress  EYES: PERRLA, without pallor or jaundice  ENT/MOUTH: Ears unremarkable. No oral lesions  CVS: s1s2 normal  RESPIRATORY: Chest is clear  GI: Abdomen is benign  NEURO: He is alert and oriented ×3  INTEGUMENT: no concerning " his skin rashes   LYMPHATIC: no palpable lymphadenopathy  MUSCULOSKELETAL: Unremarkable. No bony tenderness.   EXTREMITIES: no pedal edema  PSYCH: Mentation, mood and affect are appropriate  Breast exam was  done in the presence of a female chaperone.  The surgical scars on the left side has blended well. Today I was unable to feel any underlying firmness in the breast throughout felt very soft and smooth. There is no tenderness. On the right side the exam is normal. The nipple is everted on both sides      Mammogram done in September 2017 is unremarkable      ASSESSMENT/PLAN:    DCIS-, nuclear grade 2, cribriform and papillary types, measuring 7 mm in greatest dimension.  All margins are negative with the closest margin being 2.5 mm from DCIS.  There is associated atypical ductal hyperplasia. It is ER and IN positive.    She completed adjuvant radiation around the end of 01/2017 as mentioned above.     She has opted not to get adjuvant hormonal therapy. Previously she was on oral contraceptive pills which she has a stopped.    She continues to be on routine surveillance    Her most recent mammogram is negative.     We will continue with annual mammograms. Next one would be due in September 2018     I would recommend self breast exams regularly and exam via provider every 6 months. This is in addition to annual mammograms.    Discussion regarding exercise. She is exercising about 30 minutes 3 times a week. I strongly encouraged her to exercise more and do at least 150 minutes of aerobic exercise weekly.    Nutrition. She has gained some weight which she attributes to not eating very healthy. I strongly encouraged her to eat healthy and gradually lose weight with healthy eating and regular exercise    I discussed with her that going forward she can either follow with me or with her primary care physician. At this time she will feels comfortable following with her primary care physician and see me on an as-needed  basis. She is noticed to contact me if she has any questions or concerns.      We did not address the following today  She had genetic testing done and was negative as below   Rizwana is negative for mutations in PAZ, BRCA1, BRCA2, CDH1, CHEK2, PALB2, PTEN, and TP53 by sequencing and deletion/duplication analysis. No mutations were found in any of the eight genes analyzed.      I answered all of her questions to her satisfaction and she is agreeable and comfortable with the plan      Samantha Beltrán

## 2017-10-03 NOTE — MR AVS SNAPSHOT
After Visit Summary   10/3/2017    Rizwana Aguirre    MRN: [id number]           Patient Information     Date Of Birth          1965        Visit Information        Provider Department      10/3/2017 3:45 PM Samantha Beltrán MD [facility]        Today's Diagnoses     Need for prophylactic vaccination and inoculation against influenza    -  1      Care Instructions    Follow up with your PCP    I would recommend every 6 months, physical exams by a provider and annual mammograms. Next due in Sept 2018    See me back as needed          Follow-ups after your visit        Your next 10 appointments already scheduled     Oct 11, 2017  4:00 PM CDT   John Arteaga with Kristine Marie MD   [facility] ([facility])    [address] [place] [address] [phone]              Who to contact     If you have questions or need follow up information about today's clinic visit or your schedule please contact [facility] directly at [phone].  Normal or non-critical lab and imaging results will be communicated to you by MyChart, letter or phone within 4 business days after the clinic has received the results. If you do not hear from us within 7 days, please contact the clinic through [employer] or phone. If you have a critical or abnormal lab result, we will notify you by phone as soon as possible.  Submit refill requests through [employer] or call your pharmacy and they will forward the refill request to us. Please allow 3 business days for your refill to be completed.          Additional Information About Your Visit        MyChart Information     [employer] gives you secure access to your electronic health record. If you see a primary care provider, you can also send messages to your care team and make appointments. If you have questions, please call your primary care clinic.  If you do not have a primary care provider, please  "call [phone] and they will assist you.      [employer] is an electronic gateway that provides easy, online access to your medical records. With [employer], you can request a clinic appointment, read your test results, renew a prescription or communicate with your care team.     To access your existing account, please contact your [facility] or call [phone] for assistance.        Care EveryWhere ID     This is your Care EveryWhere ID. This could be used by other organizations to access your [facility] medical records  [id number]        Your Vitals Were     Pulse Temperature Height Pulse Oximetry BMI (Body Mass Index)       60 97.8  F (36.6  C) 1.549 m (5' 0.98\") 98% 38.94 kg/m2        Blood Pressure from Last 3 Encounters:   10/03/17 125/65   04/18/17 117/68   03/07/17 130/65    Weight from Last 3 Encounters:   10/03/17 93.4 kg (206 lb)   03/07/17 88 kg (194 lb)   02/22/17 87.5 kg (193 lb)              Today, you had the following     No orders found for display       Primary Care Provider Office Phone # Fax #    Indira Joanne Aldrich PA-C [phone]       [address]  [facility] [address]        Equal Access to Services     [facility] Center[facility] : Hadii aad ku hadasho Soomaali, waaxda luqadaha, qaybta kaalmada adeegyada, waxay idiin hayaan yoseph khlary pizarro . So [facility] [phone].    ATENCIÓN: Si habla español, tiene a new disposición servicios gratuitos de asistencia lingüística. Llame al [phone].    We comply with applicable federal civil rights laws and [place] laws. We do not discriminate on the basis of race, color, national origin, age, disability, sex, sexual orientation, or gender identity.            Thank you!     Thank you for choosing [facility]  for your care. Our goal is always to provide you with excellent care. Hearing back from our patients is one way we can continue to improve our services. Please take a few minutes to " complete the written survey that you may receive in the mail after your visit with us. Thank you!             Your Updated Medication List - Protect others around you: Learn how to safely use, store and throw away your medicines at www.disposemymeds.org.          This list is accurate as of: 10/3/17 11:59 PM.  Always use your most recent med list.                   Brand Name Dispense Instructions for use Diagnosis    COQ-10 PO      Take 2 tablets by mouth        hydrOXYzine 10 MG tablet    ATARAX    30 tablet    Take 1 tablet by mouth nightly as needed for itching.    Pruritus       lisinopril 10 MG tablet    PRINIVIL/ZESTRIL    90 tablet    Take 1 tablet (10 mg) by mouth daily    Hypertension goal BP (blood pressure) < 140/90       olopatadine HCl 0.2 % Soln    PATADAY    1 Bottle    Place 1 drop into both eyes daily as needed    Allergic conjunctivitis, bilateral       OMEGA-3 FISH OIL PO      Take 2 g by mouth daily        PROBIOTIC DAILY PO           SUMAtriptan 50 MG tablet    IMITREX    9 tablet    Take 1 tablet (50 mg) by mouth at onset of headache for migraine May repeat dose in 2 hours.  Do not exceed 200 mg in 24 hours    Classic migraine with aura       triamcinolone 0.1 % cream    KENALOG    30 g    Apply sparingly to affected area two times daily for 10-14 days.    Atopic dermatitis, unspecified type

## 2017-10-03 NOTE — NURSING NOTE
"Oncology Rooming Note    October 3, 2017 3:54 PM   Rizwana Aguirre is a 51 year old female who presents for:    Chief Complaint   Patient presents with     Oncology Clinic Visit     follow up after mammo     Initial Vitals: /65  Pulse 60  Temp 97.8  F (36.6  C)  Ht 1.549 m (5' 0.98\")  Wt 93.4 kg (206 lb)  SpO2 98%  BMI 38.94 kg/m2 Estimated body mass index is 38.94 kg/(m^2) as calculated from the following:    Height as of this encounter: 1.549 m (5' 0.98\").    Weight as of this encounter: 93.4 kg (206 lb). Body surface area is 2 meters squared.  No Pain (0) Comment: Data Unavailable   No LMP recorded.  Allergies reviewed: Yes  Medications reviewed: Yes    Medications: Medication refills not needed today.  Pharmacy name entered into [employer]:    [facility] [place], [place] - [address] [facility] [place], [place] - [address]        5 minutes for nursing intake (face to face time)     Sarita Medrano LPN      Injectable Influenza Immunization Documentation    1.  Has the patient received the information for the injectable influenza vaccine? YES     2. Is the patient 6 months of age or older? YES     3. Does the patient have any of the following contraindications?         Severe allergy to eggs? No     Severe allergic reaction to previous influenza vaccines? No   Severe allergy to latex? No       History of Guillain-[place] syndrome? No     Currently have a temperature greater than 100.4F? No        4.  Severely egg allergic patients should have flu vaccine eligibility assessed by an MD, RN, or pharmacist, and those who received flu vaccine should be observed for 15 min by an MD, RN, Pharmacist, Medical Technician, or member of clinic staff.\": YES    5. Latex-allergic patients should be given latex-free influenza vaccine Yes. Please reference the Vaccine latex table to determine if your clinic s product is latex-containing.       Vaccination given by Sarita Medrano" LPN

## 2017-10-03 NOTE — PATIENT INSTRUCTIONS
Follow up with your PCP    I would recommend every 6 months, physical exams by a provider and annual mammograms. Next due in Sept 2018    See me back as needed

## 2017-10-11 ENCOUNTER — OFFICE VISIT (OUTPATIENT)
Dept: FAMILY MEDICINE | Facility: CLINIC | Age: 52
End: 2017-10-11
Payer: COMMERCIAL

## 2017-10-11 VITALS
WEIGHT: 206.2 LBS | BODY MASS INDEX: 38.93 KG/M2 | OXYGEN SATURATION: 99 % | SYSTOLIC BLOOD PRESSURE: 126 MMHG | DIASTOLIC BLOOD PRESSURE: 80 MMHG | HEART RATE: 63 BPM | RESPIRATION RATE: 12 BRPM | TEMPERATURE: 98.7 F | HEIGHT: 61 IN

## 2017-10-11 DIAGNOSIS — R41.3 MEMORY CHANGE: ICD-10-CM

## 2017-10-11 DIAGNOSIS — D05.12 DUCTAL CARCINOMA IN SITU (DCIS) OF LEFT BREAST: ICD-10-CM

## 2017-10-11 DIAGNOSIS — I10 HYPERTENSION GOAL BP (BLOOD PRESSURE) < 140/90: Primary | ICD-10-CM

## 2017-10-11 DIAGNOSIS — Z80.8 FAMILY HISTORY OF MELANOMA: ICD-10-CM

## 2017-10-11 LAB
ALBUMIN SERPL-MCNC: 3.7 G/DL (ref 3.4–5)
ALP SERPL-CCNC: 79 U/L (ref 40–150)
ALT SERPL W P-5'-P-CCNC: 39 U/L (ref 0–50)
ANION GAP SERPL CALCULATED.3IONS-SCNC: 4 MMOL/L (ref 3–14)
AST SERPL W P-5'-P-CCNC: 15 U/L (ref 0–45)
BILIRUB SERPL-MCNC: 0.2 MG/DL (ref 0.2–1.3)
BUN SERPL-MCNC: 21 MG/DL (ref 7–30)
CALCIUM SERPL-MCNC: 8.8 MG/DL (ref 8.5–10.1)
CHLORIDE SERPL-SCNC: 104 MMOL/L (ref 94–109)
CO2 SERPL-SCNC: 30 MMOL/L (ref 20–32)
CREAT SERPL-MCNC: 0.63 MG/DL (ref 0.52–1.04)
CREAT UR-MCNC: 65 MG/DL
GFR SERPL CREATININE-BSD FRML MDRD: >90 ML/MIN/1.7M2
GLUCOSE SERPL-MCNC: 98 MG/DL (ref 70–99)
LDLC SERPL DIRECT ASSAY-MCNC: 170 MG/DL
MICROALBUMIN UR-MCNC: 5 MG/L
MICROALBUMIN/CREAT UR: 8.39 MG/G CR (ref 0–25)
POTASSIUM SERPL-SCNC: 4.1 MMOL/L (ref 3.4–5.3)
PROT SERPL-MCNC: 7.5 G/DL (ref 6.8–8.8)
SODIUM SERPL-SCNC: 138 MMOL/L (ref 133–144)
TSH SERPL DL<=0.005 MIU/L-ACNC: 1.53 MU/L (ref 0.4–4)
VIT B12 SERPL-MCNC: 444 PG/ML (ref 193–986)

## 2017-10-11 PROCEDURE — 84443 ASSAY THYROID STIM HORMONE: CPT | Performed by: PREVENTIVE MEDICINE

## 2017-10-11 PROCEDURE — 82306 VITAMIN D 25 HYDROXY: CPT | Performed by: PREVENTIVE MEDICINE

## 2017-10-11 PROCEDURE — 83721 ASSAY OF BLOOD LIPOPROTEIN: CPT | Performed by: PREVENTIVE MEDICINE

## 2017-10-11 PROCEDURE — 82043 UR ALBUMIN QUANTITATIVE: CPT | Performed by: PREVENTIVE MEDICINE

## 2017-10-11 PROCEDURE — 36415 COLL VENOUS BLD VENIPUNCTURE: CPT | Performed by: PREVENTIVE MEDICINE

## 2017-10-11 PROCEDURE — 99214 OFFICE O/P EST MOD 30 MIN: CPT | Performed by: PREVENTIVE MEDICINE

## 2017-10-11 PROCEDURE — 80053 COMPREHEN METABOLIC PANEL: CPT | Performed by: PREVENTIVE MEDICINE

## 2017-10-11 PROCEDURE — 82607 VITAMIN B-12: CPT | Performed by: PREVENTIVE MEDICINE

## 2017-10-11 RX ORDER — LOSARTAN POTASSIUM 50 MG/1
50 TABLET ORAL DAILY
Qty: 90 TABLET | Refills: 1 | Status: SHIPPED | OUTPATIENT
Start: 2017-10-11 | End: 2018-04-16

## 2017-10-11 NOTE — PROGRESS NOTES
SUBJECTIVE:   Rizwana Aguirre is a 51 year old female who presents to clinic today for the following health issues:      Hypertension Follow-up      Outpatient blood pressures are being checked at [facility].      Low Salt Diet: not monitoring salt    Amount of exercise or physical activity: 2-3 days/week for an average of 30-45 minutes    Problems taking medications regularly: No    Medication side effects: none  Diet: regular (no restrictions)    Family history of melanoma:  -Patientt would like a referral for a dermatologist  -No personal history of skin cancer  -Father with melanoma  -No concerning skin lesions at this time       DCIS left breast:  -Seen by Oncology 10/3/17.     Memory concern:  -Patient feels that Lisinopril is causing memory changes, making her more forgetful  -Her  had similar symptoms and this resolved with stopping Lisinopril  -would like to switch to a different anti hypertensive     Problem list and histories reviewed & adjusted, as indicated.  Additional history: as documented    Patient Active Problem List   Diagnosis     HTN (hypertension), benign     CARDIOVASCULAR SCREENING; LDL GOAL LESS THAN 160     Hypertension goal BP (blood pressure) < 140/90     Vitamin D deficiency     Pruritus     Hyperphoria     Lesion of external ear     Classic migraine with aura     Urinary incontinence, unspecified incontinence type     Functional urinary incontinence     Atopic dermatitis, unspecified type     Atypical ductal hyperplasia of left breast     Mild aortic insufficiency     Non-rheumatic mitral regurgitation     Ductal carcinoma in situ (DCIS) of left breast     Past Surgical History:   Procedure Laterality Date     BIOPSY BREAST NEEDLE LOCALIZATION Left 2016    Procedure: BIOPSY BREAST NEEDLE LOCALIZATION;  Surgeon: Livia Frederick MD;  Location: [facility] OR     BREAST BIOPSY, CORE RT/LT Left 10/12/2016     C  DELIVERY ONLY  x2     COLONOSCOPY WITH CO2 INSUFFLATION N/A  4/18/2017    Procedure: COLONOSCOPY WITH CO2 INSUFFLATION;  BMI 36.11  Colon cancer screening  Referring Provider-Indira Aldrich  Pharmacy [facility]  Fax [phone]  ;  Surgeon: Duane, William Charles, MD;  Location:  OR       Social History   Substance Use Topics     Smoking status: Never Smoker     Smokeless tobacco: Never Used     Alcohol use 0.0 oz/week      Comment: Social     Family History   Problem Relation Age of Onset     C.A.D. Mother      Hypertension Mother      C.A.D. Father      CANCER Father 40     Melanoma     Hypertension Father      Other Cancer Father      melanoma     CEREBROVASCULAR DISEASE Maternal Grandmother          Current Outpatient Prescriptions   Medication Sig Dispense Refill     losartan (COZAAR) 50 MG tablet Take 1 tablet (50 mg) by mouth daily 90 tablet 1     olopatadine HCl (PATADAY) 0.2 % SOLN Place 1 drop into both eyes daily as needed 1 Bottle 6     Probiotic Product (PROBIOTIC DAILY PO)        triamcinolone (KENALOG) 0.1 % cream Apply sparingly to affected area two times daily for 10-14 days. 30 g 1     Omega-3 Fatty Acids (OMEGA-3 FISH OIL PO) Take 2 g by mouth daily        Coenzyme Q10 (COQ-10 PO) Take 2 tablets by mouth        SUMAtriptan (IMITREX) 50 MG tablet Take 1 tablet (50 mg) by mouth at onset of headache for migraine May repeat dose in 2 hours.  Do not exceed 200 mg in 24 hours 9 tablet 1     hydrOXYzine (ATARAX) 10 MG tablet Take 1 tablet by mouth nightly as needed for itching. 30 tablet 6     Allergies   Allergen Reactions     Cantaloupe [Melon]      Scratchy throat     Shrimp Nausea     Walnuts [Nuts]      Scratchy throat     BP Readings from Last 3 Encounters:   10/11/17 126/80   10/03/17 125/65   04/18/17 117/68    Wt Readings from Last 3 Encounters:   10/11/17 206 lb 3.2 oz (93.5 kg)   10/03/17 206 lb (93.4 kg)   03/07/17 194 lb (88 kg)            Reviewed and updated as needed this visit by clinical staffTobacco  Allergies  Meds  Med Hx   "Surg Hx  Fam Hx  Soc Hx             ROS:  Constitutional, HEENT, cardiovascular, pulmonary, gi and gu systems are negative, except as otherwise noted.      OBJECTIVE:                                                    /80 (BP Location: Left arm, Patient Position: Sitting, Cuff Size: Adult Large)  Pulse 63  Temp 98.7  F (37.1  C) (Oral)  Resp 12  Ht 5' 1\" (1.549 m)  Wt 206 lb 3.2 oz (93.5 kg)  SpO2 99%  BMI 38.96 kg/m2  Body mass index is 38.96 kg/(m^2).  GENERAL APPEARANCE: healthy, alert and no distress  EYES: Eyes grossly normal to inspection and conjunctivae and sclerae normal  NECK: no adenopathy, no asymmetry, masses, or scars and trachea midline and normal to palpation  RESP: lungs clear to auscultation - no rales, rhonchi or wheezes  CV: regular rates and rhythm, normal S1 S2, no S3 or S4 and no murmur, click or rub  ABDOMEN: soft, non-tender  MS: extremities normal- no gross deformities noted  SKIN: no suspicious lesions or rashes  NEURO: Normal strength and tone, mentation intact and speech normal  PSYCH: mentation appears normal and affect normal/bright    Diagnostic test results:  Diagnostic Test Results:  No results found for this or any previous visit (from the past 24 hour(s)).       ASSESSMENT/PLAN:                                                    1. Hypertension goal BP (blood pressure) < 140/90  -Blood pressure is at goal  -Stopped Lisinopril due to patient concern that this is impacting memory   -Started Losartan  -Will monitor BP at home or Medifast   - Albumin Random Urine Quantitative with Creat Ratio  - LDL cholesterol direct  - losartan (COZAAR) 50 MG tablet; Take 1 tablet (50 mg) by mouth daily  Dispense: 90 tablet; Refill: 1    2. Ductal carcinoma in situ (DCIS) of left breast  - DERMATOLOGY REFERRAL  -Seen by Oncology 10/3/17  -Annual mammograms, next one 09/18  -Physician breast exam every 6 months   Per Oncology note:    DCIS-, nuclear grade 2, cribriform and papillary " types, measuring 7 mm in greatest dimension.  All margins are negative with the closest margin being 2.5 mm from DCIS.  There is associated atypical ductal hyperplasia. It is ER and MS positive.    She completed adjuvant radiation around the end of 01/2017 as mentioned above.      She has opted not to get adjuvant hormonal therapy. Previously she was on oral contraceptive pills which she has a stopped.    3. Family history of melanoma  - DERMATOLOGY REFERRAL    4. Memory change  - TSH with free T4 reflex  - Vitamin B12  - Vitamin D Deficiency  - Comprehensive metabolic panel  -If symptoms persist despite stopping the Lisinopril, will need further evaluation.       Follow up with Provider - 6 months, sooner if any changes or concerns      Kristine Marie MD MPH    [facility]

## 2017-10-11 NOTE — NURSING NOTE
"Chief Complaint   Patient presents with     Referral     Hypertension       Initial /90 (BP Location: Right arm, Patient Position: Sitting, Cuff Size: Adult Large)  Pulse 63  Temp 98.7  F (37.1  C) (Oral)  Resp 12  Ht 1.549 m (5' 1\")  Wt 93.5 kg (206 lb 3.2 oz)  SpO2 99%  BMI 38.96 kg/m2 Estimated body mass index is 38.96 kg/(m^2) as calculated from the following:    Height as of this encounter: 1.549 m (5' 1\").    Weight as of this encounter: 93.5 kg (206 lb 3.2 oz).  Medication Reconciliation: complete     Elsi Pinto        "

## 2017-10-12 LAB — DEPRECATED CALCIDIOL+CALCIFEROL SERPL-MC: 24 UG/L (ref 20–75)

## 2017-10-13 NOTE — PROGRESS NOTES
Rizwana,     Electrolytes, glucose, kidney function, liver function, thyroid function, Vitamin D and Vitamin B 12 levels are in the normal range. Urine sample did not show any abnormal protein.     LDL cholesterol is elevated at 170, should be less than 130. This has increased from 126 last year. Please let me know if you would like to start medication or try lifestyle modifications for 3 months.     Please do not hesitate to call us at [phone] if you have any questions or concerns.    Thank you,    Kristine Marie MD MPH

## 2017-10-23 ENCOUNTER — TELEPHONE (OUTPATIENT)
Dept: DERMATOLOGY | Facility: CLINIC | Age: 52
End: 2017-10-23

## 2017-10-23 NOTE — TELEPHONE ENCOUNTER
Patient on wait list for skin check. Left message for patient to call [facility] in [place] back at [phone] if she would like a sooner appointment.     Sarita Isabel LPN

## 2017-10-25 NOTE — TELEPHONE ENCOUNTER
Pt called, no answer. VM Id's pt. Left message for pt to call the [facility] clinic back at [phone]...Claudette Ram RN

## 2017-10-31 ENCOUNTER — OFFICE VISIT (OUTPATIENT)
Dept: DERMATOLOGY | Facility: CLINIC | Age: 52
End: 2017-10-31
Payer: COMMERCIAL

## 2017-10-31 DIAGNOSIS — L82.1 SEBORRHEIC KERATOSIS: ICD-10-CM

## 2017-10-31 DIAGNOSIS — L81.4 SOLAR LENTIGINOSIS: ICD-10-CM

## 2017-10-31 DIAGNOSIS — Z80.8 FAMILY HISTORY OF MELANOMA: Primary | ICD-10-CM

## 2017-10-31 DIAGNOSIS — D22.9 MULTIPLE BENIGN NEVI: ICD-10-CM

## 2017-10-31 PROCEDURE — 99203 OFFICE O/P NEW LOW 30 MIN: CPT | Mod: 25 | Performed by: DERMATOLOGY

## 2017-10-31 PROCEDURE — 17110 DESTRUCTION B9 LES UP TO 14: CPT | Performed by: DERMATOLOGY

## 2017-10-31 ASSESSMENT — PAIN SCALES - GENERAL: PAINLEVEL: NO PAIN (0)

## 2017-10-31 NOTE — LETTER
10/31/2017       RE: Rizwana Aguirre  [address]  [place] [address]     Dear Colleague,    Thank you for referring your patient, Rizwana Aguirre, to the [facility]. Please see a copy of my visit note below.    [facility] Dermatology Note      Dermatology Problem List:  1. Eczema   -s/p triamcinolone   2. Solar lentigines   3. Nevi, removed by outside physician and normal on the lip, cosmetic per patient  4. Seborrheic keratoses  -s/p cryo     Encounter Date: Oct 31, 2017    CC:  Chief Complaint   Patient presents with     Derm Problem     Full body skin check.      Eczema     Controlled with triamcinolone         History of Present Illness:  Ms. Rizwana Aguirre is a 51 year old female who presents as a new patient as a referral from Dr. Carmona for a full body check and has a history for eczema controlled with triamcinolone. She has no spots of concern. No spots that are bleeding, crusting or changing. There is lesion that is slightly pruritic on her right cheek.     The patient has a history of mole removal and a nickel or skin allergy. No other new or changing lesions.     Past Medical History:   Patient Active Problem List   Diagnosis     HTN (hypertension), benign     CARDIOVASCULAR SCREENING; LDL GOAL LESS THAN 160     Hypertension goal BP (blood pressure) < 140/90     Vitamin D deficiency     Pruritus     Hyperphoria     Lesion of external ear     Classic migraine with aura     Urinary incontinence, unspecified incontinence type     Functional urinary incontinence     Atopic dermatitis, unspecified type     Atypical ductal hyperplasia of left breast     Mild aortic insufficiency     Non-rheumatic mitral regurgitation     Ductal carcinoma in situ (DCIS) of left breast     Past Medical History:   Diagnosis Date     Aortic insufficiency 10/16/09    Trace - per ECHO     DCIS (ductal carcinoma in situ) 11/14/2016    Left  breast     Heart murmur      HTN (hypertension), benign      Hyperphoria 2013     Kidney stones      Migraines      Mitral insufficiency 10/16/09    Mild - per ECHO     Motion sickness      Seasonal allergic rhinitis      Uncomplicated asthma      Past Surgical History:   Procedure Laterality Date     BIOPSY BREAST NEEDLE LOCALIZATION Left 2016    Procedure: BIOPSY BREAST NEEDLE LOCALIZATION;  Surgeon: Livia Frederick MD;  Location: [facility] OR     BREAST BIOPSY, CORE RT/LT Left 10/12/2016     C  DELIVERY ONLY  x2     COLONOSCOPY WITH CO2 INSUFFLATION N/A 2017    Procedure: COLONOSCOPY WITH CO2 INSUFFLATION;  BMI 36.11  Colon cancer screening  Referring Provider-Indira Aldrich  Pharmacy [facility]  Fax [phone]  ;  Surgeon: Duane, William Charles, MD;  Location: [facility] OR       Social History:  The patient works as a manager in a training department. The patient admits to use of tanning beds.    Family History:  There is a family history of melanoma in the patient's family.     Medications:  Current Outpatient Prescriptions   Medication Sig Dispense Refill     losartan (COZAAR) 50 MG tablet Take 1 tablet (50 mg) by mouth daily 90 tablet 1     olopatadine HCl (PATADAY) 0.2 % SOLN Place 1 drop into both eyes daily as needed 1 Bottle 6     Probiotic Product (PROBIOTIC DAILY PO)        triamcinolone (KENALOG) 0.1 % cream Apply sparingly to affected area two times daily for 10-14 days. 30 g 1     Omega-3 Fatty Acids (OMEGA-3 FISH OIL PO) Take 2 g by mouth daily        Coenzyme Q10 (COQ-10 PO) Take 2 tablets by mouth        SUMAtriptan (IMITREX) 50 MG tablet Take 1 tablet (50 mg) by mouth at onset of headache for migraine May repeat dose in 2 hours.  Do not exceed 200 mg in 24 hours 9 tablet 1     hydrOXYzine (ATARAX) 10 MG tablet Take 1 tablet by mouth nightly as needed for itching. 30 tablet 6       Allergies   Allergen Reactions     Cantaloupe [Melon]      Scratchy throat     Shrimp  Nausea     Walnuts [Nuts]      Scratchy throat       Review of Systems:  -Constitutional: The patient denies fatigue, fevers, chills, unintended weight loss, and night sweats.  -HEENT: Patient denies nonhealing oral sores.  -GYN: No genital sores  -Skin: As above in HPI. No additional skin concerns.    Physical exam:  Vitals: There were no vitals taken for this visit.  GEN: This is a well developed, well-nourished female in no acute distress, in a pleasant mood.    SKIN: Total skin excluding the undergarment areas was performed. The exam included the head/face, neck, both arms, chest, back, abdomen, both legs, digits and/or nails. Including external buttocks. Declines genital exam.   -There are waxy stuck on tan to brown papules on the face.  -Scattered brown macules on sun exposed areas.  -Multiple regular brown pigmented macules and papules are identified on the trunk and extremties.   -No other lesions of concern on areas examined.       Impression/Plan:  1. Eczematous dermatitis, behind the legs. Exacerbated with seasonal allergies     Triamcinolone cream prescribed by her primary care provider      2. Seborrheic keratosis, on the right temples, right cheeks,  left forehead, left temple, left cheek     Cryotherapy procedure note: After verbal consent and discussion of risks and benefits including but no limited to dyspigmentation/scar, blister, and pain, 10 was(were) treated with 1-2mm freeze border for 2 cycles with liquid nitrogen. Post cryotherapy instructions were provided.     3. Nevi trunk and extremities/Family history of melanoma    No further intervention required. Patient to report changes.     ABCD's of melanoma were reviewed with patient and handout provided.      4. Solar lentigines, sun exposed areas.     No further intervention required. Patient to report changes.     Follow-up in 2 years, earlier for new or changing lesions.       Staff Involved:  Scribe/Staff    Scribe Disclosure:   Alicia GLASS  Lenny, am serving as a scribe to document services personally performed by Dr. Katarzyna Richards, based on data collection and the provider's statements to me.     Provider Disclosure:   The documentation recorded by the scribe accurately reflects the services I personally performed and the decisions made by me.    Katarzyna Richards MD    Department of Dermatology  [facility]: Phone: [phone], Fax:[phone]  [facility] [place]: Phone: [phone], Fax: [phone]        Again, thank you for allowing me to participate in the care of your patient.      Sincerely,    Katarzyna Richards MD

## 2017-10-31 NOTE — MR AVS SNAPSHOT
After Visit Summary   10/31/2017    Rizwana Aguirre    MRN: [id number]           Patient Information     Date Of Birth          1965        Visit Information        Provider Department      10/31/2017 12:00 PM Katarzyna Richards MD [facility]        Today's Diagnoses     Family history of melanoma    -  1    Seborrheic keratosis        Solar lentiginosis        Multiple benign nevi          Care Instructions    CRYOTHERAPY    What is it?    Use of a very cold liquid, such as liquid nitrogen, to freeze and destroy abnormal skin cells that need to be removed    What should I expect?    Tenderness and redness    A small blister that might grow and fill with dark purple blood. There may be crusting.    More than one treatment may be needed if the lesions do not go away.    How do I care for the treated area?    Gently wash the area with your hands when bathing.    Use a thin layer of Vaselin to help with healing. You may use a Band-Aid.     The area should heal within 7-10 days.    Do not use an antibiotic or Neosporin ointment.     You may take acetaminophen (Tylenol) for pain.     Call your Doctor if you have:    Severe pain    Signs of infection (warmth, redness, cloudy yellow drainage, and or a bad smell)    Questions or concerns    Contact infromation:  [facility] [phone]  [facility] [phone]            Follow-ups after your visit        Who to contact     If you have questions or need follow up information about today's clinic visit or your schedule please contact [facility] directly at [phone].  Normal or non-critical lab and imaging results will be communicated to you by MyChart, letter or phone within 4 business days after the clinic has received the results. If you do not hear from us within 7 days, please contact the clinic through MyChart or phone. If you have a critical or abnormal  lab result, we will notify you by phone as soon as possible.  Submit refill requests through gokit or call your pharmacy and they will forward the refill request to us. Please allow 3 business days for your refill to be completed.          Additional Information About Your Visit        [facility] Information     gokit gives you secure access to your electronic health record. If you see a primary care provider, you can also send messages to your care team and make appointments. If you have questions, please call your primary care clinic.  If you do not have a primary care provider, please call [phone] and they will assist you.      gokit is an electronic gateway that provides easy, online access to your medical records. With gokit, you can request a clinic appointment, read your test results, renew a prescription or communicate with your care team.     To access your existing account, please contact your [facility] or call [phone] for assistance.        Care EveryWhere ID     This is your Care EveryWhere ID. This could be used by other organizations to access your [place] medical records  [id number]         Blood Pressure from Last 3 Encounters:   10/11/17 126/80   10/03/17 125/65   04/18/17 117/68    Weight from Last 3 Encounters:   10/11/17 93.5 kg (206 lb 3.2 oz)   10/03/17 93.4 kg (206 lb)   03/07/17 88 kg (194 lb)              We Performed the Following     DESTRUCT BENIGN LESION, UP TO 14        Primary Care Provider Office Phone # Fax #    Indira Joanne Aldrich PA-C [phone]       [address]  [place] [address]        Equal Access to Services     [facility]: Hadii aad ku hadasho Soomaali, waaxda luqadaha, qaybta kaalmada adeegyada, sadia donis. So [facility] [phone].    ATENCIÓN: Si habla español, tiene a new disposición servicios gratuitos de asistencia lingüística. Llame al [phone].    We comply with  applicable federal civil rights laws and [place] laws. We do not discriminate on the basis of race, color, national origin, age, disability, sex, sexual orientation, or gender identity.            Thank you!     Thank you for choosing [facility]  for your care. Our goal is always to provide you with excellent care. Hearing back from our patients is one way we can continue to improve our services. Please take a few minutes to complete the written survey that you may receive in the mail after your visit with us. Thank you!             Your Updated Medication List - Protect others around you: Learn how to safely use, store and throw away your medicines at www.disposemymeds.org.          This list is accurate as of: 10/31/17 12:42 PM.  Always use your most recent med list.                   Brand Name Dispense Instructions for use Diagnosis    COQ-10 PO      Take 2 tablets by mouth        hydrOXYzine 10 MG tablet    ATARAX    30 tablet    Take 1 tablet by mouth nightly as needed for itching.    Pruritus       losartan 50 MG tablet    COZAAR    90 tablet    Take 1 tablet (50 mg) by mouth daily    Hypertension goal BP (blood pressure) < 140/90       olopatadine HCl 0.2 % Soln    [facility]    1 Bottle    Place 1 drop into both eyes daily as needed    Allergic conjunctivitis, bilateral       OMEGA-3 FISH OIL PO      Take 2 g by mouth daily        PROBIOTIC DAILY PO           SUMAtriptan 50 MG tablet    IMITREX    9 tablet    Take 1 tablet (50 mg) by mouth at onset of headache for migraine May repeat dose in 2 hours.  Do not exceed 200 mg in 24 hours    Classic migraine with aura       triamcinolone 0.1 % cream    KENALOG    30 g    Apply sparingly to affected area two times daily for 10-14 days.    Atopic dermatitis, unspecified type

## 2017-10-31 NOTE — PATIENT INSTRUCTIONS
CRYOTHERAPY    What is it?    Use of a very cold liquid, such as liquid nitrogen, to freeze and destroy abnormal skin cells that need to be removed    What should I expect?    Tenderness and redness    A small blister that might grow and fill with dark purple blood. There may be crusting.    More than one treatment may be needed if the lesions do not go away.    How do I care for the treated area?    Gently wash the area with your hands when bathing.    Use a thin layer of Vaselin to help with healing. You may use a Band-Aid.     The area should heal within 7-10 days.    Do not use an antibiotic or Neosporin ointment.     You may take acetaminophen (Tylenol) for pain.     Call your Doctor if you have:    Severe pain    Signs of infection (warmth, redness, cloudy yellow drainage, and or a bad smell)    Questions or concerns    Contact infromation:  [facility] [phone]  [facility] [phone]

## 2017-10-31 NOTE — PROGRESS NOTES
Formerly [facility] Dermatology Note      Dermatology Problem List:  1. Eczema   -s/p triamcinolone   2. Solar lentigines   3. Nevi, removed by outside physician and normal on the lip, cosmetic per patient  4. Seborrheic keratoses  -s/p cryo     Encounter Date: Oct 31, 2017    CC:  Chief Complaint   Patient presents with     Derm Problem     Full body skin check.      Eczema     Controlled with triamcinolone         History of Present Illness:  Ms. Rizwana Aguirre is a 51 year old female who presents as a new patient as a referral from Dr. Carmona for a full body check and has a history for eczema controlled with triamcinolone. She has no spots of concern. No spots that are bleeding, crusting or changing. There is lesion that is slightly pruritic on her right cheek.     The patient has a history of mole removal and a nickel or skin allergy. No other new or changing lesions.     Past Medical History:   Patient Active Problem List   Diagnosis     HTN (hypertension), benign     CARDIOVASCULAR SCREENING; LDL GOAL LESS THAN 160     Hypertension goal BP (blood pressure) < 140/90     Vitamin D deficiency     Pruritus     Hyperphoria     Lesion of external ear     Classic migraine with aura     Urinary incontinence, unspecified incontinence type     Functional urinary incontinence     Atopic dermatitis, unspecified type     Atypical ductal hyperplasia of left breast     Mild aortic insufficiency     Non-rheumatic mitral regurgitation     Ductal carcinoma in situ (DCIS) of left breast     Past Medical History:   Diagnosis Date     Aortic insufficiency 10/16/09    Trace - per ECHO     DCIS (ductal carcinoma in situ) 11/14/2016    Left breast     Heart murmur      HTN (hypertension), benign      Hyperphoria 4/8/2013     Kidney stones      Migraines      Mitral insufficiency 10/16/09    Mild - per ECHO     Motion sickness      Seasonal allergic rhinitis      Uncomplicated asthma      Past Surgical History:   Procedure  Laterality Date     BIOPSY BREAST NEEDLE LOCALIZATION Left 2016    Procedure: BIOPSY BREAST NEEDLE LOCALIZATION;  Surgeon: Livia Frederick MD;  Location: [facility] OR     BREAST BIOPSY, CORE RT/LT Left 10/12/2016     C  DELIVERY ONLY  x2     COLONOSCOPY WITH CO2 INSUFFLATION N/A 2017    Procedure: COLONOSCOPY WITH CO2 INSUFFLATION;  BMI 36.11  Colon cancer screening  Referring Provider-Indira Aldrich  Pharmacy [facility]  Fax [phone]  ;  Surgeon: Duane, William Charles, MD;  Location: [facility] OR       Social History:  The patient works as a manager in a training department. The patient admits to use of tanning beds.    Family History:  There is a family history of melanoma in the patient's family.     Medications:  Current Outpatient Prescriptions   Medication Sig Dispense Refill     losartan (COZAAR) 50 MG tablet Take 1 tablet (50 mg) by mouth daily 90 tablet 1     olopatadine HCl (PATADAY) 0.2 % SOLN Place 1 drop into both eyes daily as needed 1 Bottle 6     Probiotic Product (PROBIOTIC DAILY PO)        triamcinolone (KENALOG) 0.1 % cream Apply sparingly to affected area two times daily for 10-14 days. 30 g 1     Omega-3 Fatty Acids (OMEGA-3 FISH OIL PO) Take 2 g by mouth daily        Coenzyme Q10 (COQ-10 PO) Take 2 tablets by mouth        SUMAtriptan (IMITREX) 50 MG tablet Take 1 tablet (50 mg) by mouth at onset of headache for migraine May repeat dose in 2 hours.  Do not exceed 200 mg in 24 hours 9 tablet 1     hydrOXYzine (ATARAX) 10 MG tablet Take 1 tablet by mouth nightly as needed for itching. 30 tablet 6       Allergies   Allergen Reactions     Cantaloupe [Melon]      Scratchy throat     Shrimp Nausea     Walnuts [Nuts]      Scratchy throat       Review of Systems:  -Constitutional: The patient denies fatigue, fevers, chills, unintended weight loss, and night sweats.  -HEENT: Patient denies nonhealing oral sores.  -GYN: No genital sores  -Skin: As above in HPI. No additional  skin concerns.    Physical exam:  Vitals: There were no vitals taken for this visit.  GEN: This is a well developed, well-nourished female in no acute distress, in a pleasant mood.    SKIN: Total skin excluding the undergarment areas was performed. The exam included the head/face, neck, both arms, chest, back, abdomen, both legs, digits and/or nails. Including external buttocks. Declines genital exam.   -There are waxy stuck on tan to brown papules on the face.  -Scattered brown macules on sun exposed areas.  -Multiple regular brown pigmented macules and papules are identified on the trunk and extremties.   -No other lesions of concern on areas examined.       Impression/Plan:  1. Eczematous dermatitis, behind the legs. Exacerbated with seasonal allergies     Triamcinolone cream prescribed by her primary care provider      2. Seborrheic keratosis, on the right temples, right cheeks,  left forehead, left temple, left cheek     Cryotherapy procedure note: After verbal consent and discussion of risks and benefits including but no limited to dyspigmentation/scar, blister, and pain, 10 was(were) treated with 1-2mm freeze border for 2 cycles with liquid nitrogen. Post cryotherapy instructions were provided.     3. Nevi trunk and extremities/Family history of melanoma    No further intervention required. Patient to report changes.     ABCD's of melanoma were reviewed with patient and handout provided.      4. Solar lentigines, sun exposed areas.     No further intervention required. Patient to report changes.     Follow-up in 2 years, earlier for new or changing lesions.       Staff Involved:  Scribe/Staff    Scribe Disclosure:   I, Alicia Galvez, am serving as a scribe to document services personally performed by Dr. Katarzyna Richards, based on data collection and the provider's statements to me.     Provider Disclosure:   The documentation recorded by the scribe accurately reflects the services I personally performed and the  decisions made by me.    Katarzyna Richards MD    Department of Dermatology  [facility]: Phone: [phone], Fax:[phone]  [facility]: Phone: [phone], Fax: [phone]

## 2017-10-31 NOTE — NURSING NOTE
Dermatology Rooming Note    Rizwana Aguirre's goals for this visit include:   Chief Complaint   Patient presents with     Derm Problem     Full body skin check.      Eczema     Controlled with triamcinolone       Is a scribe okay for this visit:YES    Are records needed for this visit(If yes, obtain release of information): No     Vitals: There were no vitals taken for this visit.    Referring Provider:  Kristine Marie MD  [address]  [place] [address]    Faiza Scott RN

## 2018-01-16 ENCOUNTER — TELEPHONE (OUTPATIENT)
Dept: FAMILY MEDICINE | Facility: CLINIC | Age: 53
End: 2018-01-16

## 2018-01-16 ENCOUNTER — OFFICE VISIT (OUTPATIENT)
Dept: FAMILY MEDICINE | Facility: CLINIC | Age: 53
End: 2018-01-16
Payer: COMMERCIAL

## 2018-01-16 VITALS
BODY MASS INDEX: 40.22 KG/M2 | HEART RATE: 75 BPM | WEIGHT: 213 LBS | OXYGEN SATURATION: 98 % | HEIGHT: 61 IN | TEMPERATURE: 98.5 F | SYSTOLIC BLOOD PRESSURE: 128 MMHG | DIASTOLIC BLOOD PRESSURE: 80 MMHG

## 2018-01-16 DIAGNOSIS — H01.139 ECZEMATOUS DERMATITIS OF EYELID, UNSPECIFIED LATERALITY: ICD-10-CM

## 2018-01-16 DIAGNOSIS — E66.01 MORBID OBESITY (H): ICD-10-CM

## 2018-01-16 DIAGNOSIS — I10 HYPERTENSION GOAL BP (BLOOD PRESSURE) < 140/90: ICD-10-CM

## 2018-01-16 DIAGNOSIS — F43.21 ADJUSTMENT DISORDER WITH DEPRESSED MOOD: Primary | ICD-10-CM

## 2018-01-16 LAB
ALBUMIN SERPL-MCNC: 3.6 G/DL (ref 3.4–5)
ALP SERPL-CCNC: 81 U/L (ref 40–150)
ALT SERPL W P-5'-P-CCNC: 45 U/L (ref 0–50)
ANION GAP SERPL CALCULATED.3IONS-SCNC: 7 MMOL/L (ref 3–14)
AST SERPL W P-5'-P-CCNC: 58 U/L (ref 0–45)
BILIRUB SERPL-MCNC: 0.3 MG/DL (ref 0.2–1.3)
BUN SERPL-MCNC: 18 MG/DL (ref 7–30)
CALCIUM SERPL-MCNC: 8.5 MG/DL (ref 8.5–10.1)
CHLORIDE SERPL-SCNC: 102 MMOL/L (ref 94–109)
CO2 SERPL-SCNC: 29 MMOL/L (ref 20–32)
CREAT SERPL-MCNC: 0.63 MG/DL (ref 0.52–1.04)
ERYTHROCYTE [DISTWIDTH] IN BLOOD BY AUTOMATED COUNT: 12.9 % (ref 10–15)
GFR SERPL CREATININE-BSD FRML MDRD: >90 ML/MIN/1.7M2
GLUCOSE SERPL-MCNC: 102 MG/DL (ref 70–99)
HCT VFR BLD AUTO: 40.4 % (ref 35–47)
HGB BLD-MCNC: 13.2 G/DL (ref 11.7–15.7)
LDLC SERPL DIRECT ASSAY-MCNC: 119 MG/DL
MCH RBC QN AUTO: 30.6 PG (ref 26.5–33)
MCHC RBC AUTO-ENTMCNC: 32.7 G/DL (ref 31.5–36.5)
MCV RBC AUTO: 94 FL (ref 78–100)
PLATELET # BLD AUTO: 226 10E9/L (ref 150–450)
POTASSIUM SERPL-SCNC: 3.9 MMOL/L (ref 3.4–5.3)
PROT SERPL-MCNC: 7.4 G/DL (ref 6.8–8.8)
RBC # BLD AUTO: 4.31 10E12/L (ref 3.8–5.2)
SODIUM SERPL-SCNC: 138 MMOL/L (ref 133–144)
TSH SERPL DL<=0.005 MIU/L-ACNC: 1.55 MU/L (ref 0.4–4)
WBC # BLD AUTO: 5 10E9/L (ref 4–11)

## 2018-01-16 PROCEDURE — 83721 ASSAY OF BLOOD LIPOPROTEIN: CPT | Performed by: PREVENTIVE MEDICINE

## 2018-01-16 PROCEDURE — 80053 COMPREHEN METABOLIC PANEL: CPT | Performed by: PREVENTIVE MEDICINE

## 2018-01-16 PROCEDURE — 36415 COLL VENOUS BLD VENIPUNCTURE: CPT | Performed by: PREVENTIVE MEDICINE

## 2018-01-16 PROCEDURE — 85027 COMPLETE CBC AUTOMATED: CPT | Performed by: PREVENTIVE MEDICINE

## 2018-01-16 PROCEDURE — 99214 OFFICE O/P EST MOD 30 MIN: CPT | Performed by: PREVENTIVE MEDICINE

## 2018-01-16 PROCEDURE — 84443 ASSAY THYROID STIM HORMONE: CPT | Performed by: PREVENTIVE MEDICINE

## 2018-01-16 RX ORDER — BUPROPION HYDROCHLORIDE 150 MG/1
150 TABLET ORAL EVERY MORNING
Qty: 30 TABLET | Refills: 1 | Status: SHIPPED | OUTPATIENT
Start: 2018-01-16 | End: 2018-02-07

## 2018-01-16 ASSESSMENT — ANXIETY QUESTIONNAIRES
GAD7 TOTAL SCORE: 5
IF YOU CHECKED OFF ANY PROBLEMS ON THIS QUESTIONNAIRE, HOW DIFFICULT HAVE THESE PROBLEMS MADE IT FOR YOU TO DO YOUR WORK, TAKE CARE OF THINGS AT HOME, OR GET ALONG WITH OTHER PEOPLE: NOT DIFFICULT AT ALL
1. FEELING NERVOUS, ANXIOUS, OR ON EDGE: SEVERAL DAYS
6. BECOMING EASILY ANNOYED OR IRRITABLE: NOT AT ALL
3. WORRYING TOO MUCH ABOUT DIFFERENT THINGS: SEVERAL DAYS
2. NOT BEING ABLE TO STOP OR CONTROL WORRYING: SEVERAL DAYS
5. BEING SO RESTLESS THAT IT IS HARD TO SIT STILL: NOT AT ALL
7. FEELING AFRAID AS IF SOMETHING AWFUL MIGHT HAPPEN: SEVERAL DAYS

## 2018-01-16 ASSESSMENT — PATIENT HEALTH QUESTIONNAIRE - PHQ9
SUM OF ALL RESPONSES TO PHQ QUESTIONS 1-9: 16
5. POOR APPETITE OR OVEREATING: SEVERAL DAYS

## 2018-01-16 ASSESSMENT — PAIN SCALES - GENERAL: PAINLEVEL: NO PAIN (0)

## 2018-01-16 NOTE — TELEPHONE ENCOUNTER
This writer attempted to contact patient on 01/16/18      Reason for call medication not covered and left detailed message with information below.        Sridevi Bell MA

## 2018-01-16 NOTE — MR AVS SNAPSHOT
After Visit Summary   1/16/2018    Rizwana gAuirre    MRN: [id number]           Patient Information     Date Of Birth          1965        Visit Information        Provider Department      1/16/2018 7:40 AM Kristine Marie MD [facility]        Today's Diagnoses     Adjustment disorder with depressed mood    -  1    Eczematous dermatitis of eyelid, unspecified laterality        Hypertension goal BP (blood pressure) < 140/90        Morbid obesity (H)          Care Instructions    At [facility], we strive to deliver an exceptional experience to you, every time we see you.  If you receive a survey in the mail, please send us back your thoughts. We really do value your feedback.    Based on your medical history, these are the current health maintenance/preventive care services that you are due for (some may have been done at this visit.)  There are no preventive care reminders to display for this patient.      Suggested websites for health information:  Www.[employer] : Up to date and easily searchable information on multiple topics.  Www.medlineplus.gov : medication info, interactive tutorials, watch real surgeries online  Www.familydoctor.org : good info from the Academy of Family Physicians  Www.cdc.gov : public health info, travel advisories, epidemics (H1N1)  Www.aap.org : children's health info, normal development, vaccinations  Www.health.state.[place].us : [place] dept of health, public health issues in [place], N1N1    Your care team:                            Family Medicine Internal Medicine   MD Reagan Mathur MD Shantel Branch-Fleming, MD Katya Georgiev PA-C Nam Ho, MD Pediatrics   ASHWINI Burger, MD Michaela Esparza CNP, MD Deborah Mielke, MD Kim Thein, APRN CNP      Clinic hours: Monday - Thursday 7 am-7 pm; Fridays 7 am-5 pm.   Urgent care: Monday - Friday 11 am-9 pm;  "Saturday and Sunday 9 am-5 pm.  Pharmacy : Monday -Thursday 8 am-8 pm; Friday 8 am-6 pm; Saturday and Sunday 9 am-5 pm.     Clinic: [phone]   Pharmacy: [phone]                Follow-ups after your visit        Follow-up notes from your care team     Return in about 4 weeks (around 2/13/2018) for Mood follow up.      Who to contact     If you have questions or need follow up information about today's clinic visit or your schedule please contact [facility] directly at [phone].  Normal or non-critical lab and imaging results will be communicated to you by MyChart, letter or phone within 4 business days after the clinic has received the results. If you do not hear from us within 7 days, please contact the clinic through [employer] or phone. If you have a critical or abnormal lab result, we will notify you by phone as soon as possible.  Submit refill requests through [email or web address] or call your pharmacy and they will forward the refill request to us. Please allow 3 business days for your refill to be completed.          Additional Information About Your Visit        GFS [employer] Information     [email or web address] gives you secure access to your electronic health record. If you see a primary care provider, you can also send messages to your care team and make appointments. If you have questions, please call your primary care clinic.  If you do not have a primary care provider, please call [phone] and they will assist you.        Care EveryWhere ID     This is your Care EveryWhere ID. This could be used by other organizations to access your [facility] medical records  [id number]        Your Vitals Were     Pulse Temperature Height Last Period Pulse Oximetry Breastfeeding?    75 98.5  F (36.9  C) (Oral) 5' 1\" (1.549 m) 02/01/2017 (Approximate) 98% No    BMI (Body Mass Index)                   40.25 kg/m2            Blood Pressure from Last 3 Encounters:   01/16/18 128/80   10/11/17 126/80   10/03/17 125/65 "    Weight from Last 3 Encounters:   01/16/18 213 lb (96.6 kg)   10/11/17 206 lb 3.2 oz (93.5 kg)   10/03/17 206 lb (93.4 kg)              We Performed the Following     CBC with platelets     Comprehensive metabolic panel     LDL cholesterol direct     TSH with free T4 reflex          Today's Medication Changes          These changes are accurate as of: 1/16/18  9:49 AM.  If you have any questions, ask your nurse or doctor.               Start taking these medicines.        Dose/Directions    buPROPion 150 MG 24 hr tablet   Commonly known as:  WELLBUTRIN XL   Used for:  Adjustment disorder with depressed mood   Started by:  Kristine Marie MD        Dose:  150 mg   Take 1 tablet (150 mg) by mouth every morning   Quantity:  30 tablet   Refills:  1       crisaborole 2 % ointment   Commonly known as:  EUCRISA   Used for:  Eczematous dermatitis of eyelid, unspecified laterality   Started by:  Kristine Marie MD        Apply topically 2 times daily   Quantity:  60 g   Refills:  0            Where to get your medicines      These medications were sent to [facility] [place], [place] - [address]  [address], [facility] [address]     Phone:  [phone]     buPROPion 150 MG 24 hr tablet    crisaborole 2 % ointment                Primary Care Provider Office Phone # Fax #    Kristine Marie [phone]       [address]  [facility] [address]        Equal Access to Services     [facility] AH: Hadii german craigo Sonereyda, waaxda luqadaha, qaybta kaalmada adeegyada, sadia donis. So [facility] [phone].    ATENCIÓN: Si habla español, tiene a new disposición servicios gratuitos de asistencia lingüística. Llame al [phone].    We comply with applicable federal civil rights laws and [place] laws. We do not discriminate on the basis of race, color, national origin, age, disability, sex, sexual orientation, or gender identity.             Thank you!     Thank you for choosing [facility]  for your care. Our goal is always to provide you with excellent care. Hearing back from our patients is one way we can continue to improve our services. Please take a few minutes to complete the written survey that you may receive in the mail after your visit with us. Thank you!             Your Updated Medication List - Protect others around you: Learn how to safely use, store and throw away your medicines at www.disposemymeds.org.          This list is accurate as of: 1/16/18  9:49 AM.  Always use your most recent med list.                   Brand Name Dispense Instructions for use Diagnosis    buPROPion 150 MG 24 hr tablet    WELLBUTRIN XL    30 tablet    Take 1 tablet (150 mg) by mouth every morning    Adjustment disorder with depressed mood       COQ-10 PO      Take 2 tablets by mouth        crisaborole 2 % ointment    EUCRISA    60 g    Apply topically 2 times daily    Eczematous dermatitis of eyelid, unspecified laterality       hydrOXYzine 10 MG tablet    ATARAX    30 tablet    Take 1 tablet by mouth nightly as needed for itching.    Pruritus       losartan 50 MG tablet    COZAAR    90 tablet    Take 1 tablet (50 mg) by mouth daily    Hypertension goal BP (blood pressure) < 140/90       olopatadine HCl 0.2 % Soln    PATADAY    1 Bottle    Place 1 drop into both eyes daily as needed    Allergic conjunctivitis, bilateral       OMEGA-3 FISH OIL PO      Take 2 g by mouth daily        PROBIOTIC DAILY PO           SUMAtriptan 50 MG tablet    IMITREX    9 tablet    Take 1 tablet (50 mg) by mouth at onset of headache for migraine May repeat dose in 2 hours.  Do not exceed 200 mg in 24 hours    Classic migraine with aura       triamcinolone 0.1 % cream    KENALOG    30 g    Apply sparingly to affected area two times daily for 10-14 days.    Atopic dermatitis, unspecified type

## 2018-01-16 NOTE — PROGRESS NOTES
SUBJECTIVE:   Rizwana Aguirre is a 52 year old female who presents to clinic today for the following health issues:      Eye(s) Problem      Duration: x 1 month    Description:  Location: bilateral  Pain: no   Redness: YES  Discharge: no     Accompanying signs and symptoms: itchy, dry watery    History (Trauma, foreign body exposure,): None    Precipitating or alleviating factors (contact use): yes, but has not wore in awhile    Therapies tried and outcome: Vasaline and Benadryl      Symptoms have improved, dryness especially around the eyes, has to rub the eyes  Has used Pataday, and Benadryl  Symptoms stable with current interventions      Low Libido:  -Symptoms for some time  -causing problems in the marriage  -Symptoms for 2 years  -No pain associated with intercourse  -Some dryness  -No use of lubricants  -Seeing a marriage counseling  -Now post menopausal, one year without menses  -Contraception was a factor as partner reluctant to use condoms  -Has had stress around marriage, infidelity by spouse attributed to lack of intimacy  -No hot flashes  -No depressive symptoms   -Has been trying to increase exercise       Problem list and histories reviewed & adjusted, as indicated.  Additional history: as documented    Patient Active Problem List   Diagnosis     HTN (hypertension), benign     CARDIOVASCULAR SCREENING; LDL GOAL LESS THAN 160     Hypertension goal BP (blood pressure) < 140/90     Vitamin D deficiency     Pruritus     Hyperphoria     Lesion of external ear     Classic migraine with aura     Urinary incontinence, unspecified incontinence type     Functional urinary incontinence     Atopic dermatitis, unspecified type     Atypical ductal hyperplasia of left breast     Mild aortic insufficiency     Non-rheumatic mitral regurgitation     Ductal carcinoma in situ (DCIS) of left breast     Morbid obesity (H)     Past Surgical History:   Procedure Laterality Date     BIOPSY BREAST NEEDLE LOCALIZATION Left  2016    Procedure: BIOPSY BREAST NEEDLE LOCALIZATION;  Surgeon: Livia Frederick MD;  Location: [facility] OR     BREAST BIOPSY, CORE RT/LT Left 10/12/2016     C  DELIVERY ONLY  x2     COLONOSCOPY WITH CO2 INSUFFLATION N/A 2017    Procedure: COLONOSCOPY WITH CO2 INSUFFLATION;  BMI 36.11  Colon cancer screening  Referring Provider-Indira Aldrich  Pharmacy [facility]  Fax [phone]  ;  Surgeon: Duane, William Charles, MD;  Location: [facility] OR       Social History   Substance Use Topics     Smoking status: Never Smoker     Smokeless tobacco: Never Used     Alcohol use 0.0 oz/week      Comment: Social     Family History   Problem Relation Age of Onset     C.A.D. Mother      Hypertension Mother      C.A.D. Father      CANCER Father 40     Melanoma     Hypertension Father      Other Cancer Father      melanoma     CEREBROVASCULAR DISEASE Maternal Grandmother          Current Outpatient Prescriptions   Medication Sig Dispense Refill     buPROPion (WELLBUTRIN XL) 150 MG 24 hr tablet Take 1 tablet (150 mg) by mouth every morning 30 tablet 1     crisaborole (EUCRISA) 2 % ointment Apply topically 2 times daily 60 g 0     losartan (COZAAR) 50 MG tablet Take 1 tablet (50 mg) by mouth daily 90 tablet 1     olopatadine HCl (PATADAY) 0.2 % SOLN Place 1 drop into both eyes daily as needed 1 Bottle 6     Probiotic Product (PROBIOTIC DAILY PO)        triamcinolone (KENALOG) 0.1 % cream Apply sparingly to affected area two times daily for 10-14 days. 30 g 1     Omega-3 Fatty Acids (OMEGA-3 FISH OIL PO) Take 2 g by mouth daily        Coenzyme Q10 (COQ-10 PO) Take 2 tablets by mouth        SUMAtriptan (IMITREX) 50 MG tablet Take 1 tablet (50 mg) by mouth at onset of headache for migraine May repeat dose in 2 hours.  Do not exceed 200 mg in 24 hours 9 tablet 1     hydrOXYzine (ATARAX) 10 MG tablet Take 1 tablet by mouth nightly as needed for itching. 30 tablet 6     Allergies   Allergen Reactions     Cantaloupe  "[Melon]      Scratchy throat     Shrimp Nausea     Walnuts [Nuts]      Scratchy throat     BP Readings from Last 3 Encounters:   01/16/18 128/80   10/11/17 126/80   10/03/17 125/65    Wt Readings from Last 3 Encounters:   01/16/18 213 lb (96.6 kg)   10/11/17 206 lb 3.2 oz (93.5 kg)   10/03/17 206 lb (93.4 kg)                  Labs reviewed in EPIC        [facility] and [facility]       Reviewed and updated as needed this visit by Provider         ROS:  Constitutional, HEENT, cardiovascular, pulmonary, gi and gu systems are negative, except as otherwise noted.      OBJECTIVE:                                                    /80  Pulse 75  Temp 98.5  F (36.9  C) (Oral)  Ht 5' 1\" (1.549 m)  Wt 213 lb (96.6 kg)  LMP 02/01/2017 (Approximate)  SpO2 98%  Breastfeeding? No  BMI 40.25 kg/m2  Body mass index is 40.25 kg/(m^2).  GENERAL APPEARANCE: healthy and alert  EYES: conjunctivae and sclerae normal and macular papular areas of scaling and dryness around the eyes. No Drainage or blisters  NECK: no adenopathy  RESP: lungs clear to auscultation - no rales, rhonchi or wheezes  CV: regular rates and rhythm, normal S1 S2, no S3 or S4 and no murmur, click or rub  ABDOMEN: soft, non-tender  MS: extremities normal- no gross deformities noted  SKIN: no suspicious lesions or rashes  NEURO: Normal strength and tone, mentation intact and speech normal  PSYCH: mentation appears normal and crying    Diagnostic test results:  Diagnostic Test Results:  Results for orders placed or performed in visit on 01/16/18 (from the past 24 hour(s))   CBC with platelets   Result Value Ref Range    WBC 5.0 4.0 - 11.0 10e9/L    RBC Count 4.31 3.8 - 5.2 10e12/L    Hemoglobin 13.2 11.7 - 15.7 g/dL    Hematocrit 40.4 35.0 - 47.0 %    MCV 94 78 - 100 fl    MCH 30.6 26.5 - 33.0 pg    MCHC 32.7 31.5 - 36.5 g/dL    RDW 12.9 10.0 - 15.0 %    Platelet Count 226 150 - 450 10e9/L      "     ASSESSMENT/PLAN:                                                    1. Adjustment disorder with depressed mood  -PHQ9 is 16, SUSHIL 7 is 5  -Extensive discussion on low libido, recommendation from Up to Date reviewed with the patient. Start Bupropion to help with mood and libido  -Defer hormonal intervention at this time   -Await labs  -Already seeing a marriage counselor  -Advised to establish with a sex therapist  -Use of foreplay and lubricants discussed   - buPROPion (WELLBUTRIN XL) 150 MG 24 hr tablet; Take 1 tablet (150 mg) by mouth every morning  Dispense: 30 tablet; Refill: 1  -Phone follow up in 4 weeks   -Increase exercise     2. Eczematous dermatitis of eyelid, unspecified laterality  -Use of Vaseline or Aquaphor   - crisaborole (EUCRISA) 2 % ointment; Apply topically 2 times daily  Dispense: 60 g; Refill: 0    3. Hypertension goal BP (blood pressure) < 140/90  -Readings are at goal  -Continue current medication   - TSH with free T4 reflex  - CBC with platelets  - Comprehensive metabolic panel  - LDL cholesterol direct    4. Morbid obesity (H)  -Has gained weight since last check   - TSH with free T4 reflex      Follow up with Provider - 4 weeks for mood follow up      Kristine Marie MD MPH    [facility]

## 2018-01-16 NOTE — TELEPHONE ENCOUNTER
Please let patient know that the medication for the eyes is not covered by insurance.  She can try Aquaphor, continue with Pataday eye drops, and can use Claritin 10 mg twice a day if needed for itching.  Thanks,  Kristine Marie MD MPH

## 2018-01-16 NOTE — PATIENT INSTRUCTIONS
At [facility], we strive to deliver an exceptional experience to you, every time we see you.  If you receive a survey in the mail, please send us back your thoughts. We really do value your feedback.    Based on your medical history, these are the current health maintenance/preventive care services that you are due for (some may have been done at this visit.)  There are no preventive care reminders to display for this patient.      Suggested websites for health information:  Www.[employer].org : Up to date and easily searchable information on multiple topics.  Www.medlineplus.gov : medication info, interactive tutorials, watch real surgeries online  Www.familydoctor.org : good info from the Academy of Family Physicians  Www.cdc.gov : public health info, travel advisories, epidemics (H1N1)  Www.aap.org : children's health info, normal development, vaccinations  Www.health.[facility].us : [place] dept of health, public health issues in [place], N1N1    Your care team:                            Family Medicine Internal Medicine   MD Reagan Mathur MD Shantel Branch-Fleming, MD Katya Georgiev PA-C Nam Ho, MD Pediatrics   ASHWINI Burger, CELENA Guillermo APRJACOBO CNP   MD Michaela Benjamin MD Deborah Mielke, MD Kim Thein, APRN CNP      Clinic hours: Monday - Thursday 7 am-7 pm; Fridays 7 am-5 pm.   Urgent care: Monday - Friday 11 am-9 pm; Saturday and Sunday 9 am-5 pm.  Pharmacy : Monday -Thursday 8 am-8 pm; Friday 8 am-6 pm; Saturday and Sunday 9 am-5 pm.     Clinic: [phone]   Pharmacy: [phone]

## 2018-01-16 NOTE — TELEPHONE ENCOUNTER
Eucrisa 2% oint  PRIOR AUTH BHARATI-MD CALL [phone].  DRUG REQUIRES PRIOR AUTHORIZATION  Pt has [employer] insurance  ID [id number]  Thank you very kindly!  Abby Rojas CPhT  [facility]

## 2018-01-17 ASSESSMENT — ANXIETY QUESTIONNAIRES: GAD7 TOTAL SCORE: 5

## 2018-01-17 NOTE — PROGRESS NOTES
Rizwana,     Basic blood count did not show anemia or infection in the blood. Electrolytes, non fasting glucose, kidney function and thyroid function are normal.     LDL cholesterol is much improved from last check from 170 to 119 and is at goal.     One liver test was borderline high, AST was 58, should be less than 45. Avoid large amounts of alcohol or Tylenol. We can recheck this in 6 months.     Please do not hesitate to call us at [phone] if you have any questions or concerns.    Thank you,    Kristine Marie MD MPH

## 2018-01-19 ENCOUNTER — MYC MEDICAL ADVICE (OUTPATIENT)
Dept: FAMILY MEDICINE | Facility: CLINIC | Age: 53
End: 2018-01-19

## 2018-02-07 ENCOUNTER — OFFICE VISIT (OUTPATIENT)
Dept: FAMILY MEDICINE | Facility: CLINIC | Age: 53
End: 2018-02-07
Payer: COMMERCIAL

## 2018-02-07 VITALS
WEIGHT: 209 LBS | DIASTOLIC BLOOD PRESSURE: 84 MMHG | HEART RATE: 73 BPM | TEMPERATURE: 97.7 F | SYSTOLIC BLOOD PRESSURE: 130 MMHG | OXYGEN SATURATION: 100 % | BODY MASS INDEX: 39.46 KG/M2 | HEIGHT: 61 IN

## 2018-02-07 DIAGNOSIS — F43.21 ADJUSTMENT DISORDER WITH DEPRESSED MOOD: Primary | ICD-10-CM

## 2018-02-07 DIAGNOSIS — E66.01 MORBID OBESITY (H): ICD-10-CM

## 2018-02-07 DIAGNOSIS — I10 HYPERTENSION GOAL BP (BLOOD PRESSURE) < 140/90: ICD-10-CM

## 2018-02-07 DIAGNOSIS — R94.5 NONSPECIFIC ABNORMAL RESULTS OF LIVER FUNCTION STUDY: ICD-10-CM

## 2018-02-07 PROCEDURE — 99214 OFFICE O/P EST MOD 30 MIN: CPT | Performed by: PREVENTIVE MEDICINE

## 2018-02-07 RX ORDER — BUPROPION HYDROCHLORIDE 150 MG/1
150 TABLET ORAL EVERY MORNING
Qty: 90 TABLET | Refills: 1 | Status: SHIPPED | OUTPATIENT
Start: 2018-02-07 | End: 2018-07-15

## 2018-02-07 ASSESSMENT — PAIN SCALES - GENERAL: PAINLEVEL: NO PAIN (0)

## 2018-02-07 NOTE — MR AVS SNAPSHOT
After Visit Summary   2/7/2018    Rizwana Aguirre    MRN: [id number]           Patient Information     Date Of Birth          1965        Visit Information        Provider Department      2/7/2018 4:00 PM Kristine Marie MD [facility]        Today's Diagnoses     Adjustment disorder with depressed mood    -  1    Hypertension goal BP (blood pressure) < 140/90        Morbid obesity (H)        Nonspecific abnormal results of liver function study          Care Instructions    At [facility], we strive to deliver an exceptional experience to you, every time we see you.  If you receive a survey in the mail, please send us back your thoughts. We really do value your feedback.    Based on your medical history, these are the current health maintenance/preventive care services that you are due for (some may have been done at this visit.)  Health Maintenance Due   Topic Date Due     DEPRESSION ACTION PLAN Q1 YR  12/19/1984     PAP Q3 YR  08/07/2018         Suggested websites for health information:  Www."Mobile Location, IP" : Up to date and easily searchable information on multiple topics.  Www.medlineplus.gov : medication info, interactive tutorials, watch real surgeries online  Www.familydoctor.org : good info from the Academy of Family Physicians  Www.cdc.gov : public health info, travel advisories, epidemics (H1N1)  Www.aap.org : children's health info, normal development, vaccinations  Www.health.[facility] : [place] dept of health, public health issues in [place], N1N1    Your care team:                            Family Medicine Internal Medicine   MD Reagan Mathur MD Shantel Branch-Fleming, MD Katya Georgiev PA-C Nam Ho, MD Pediatrics   ASHWINI Burger, MD Michaela Esparza CNP, MD Deborah Mielke, MD Kim Thein, ANTONIO CNP      Clinic hours: Monday - Thursday 7 am-7 pm; Fridays 7 am-5 pm.  "  Urgent care: Monday - Friday 11 am-9 pm; Saturday and Sunday 9 am-5 pm.  Pharmacy : Monday -Thursday 8 am-8 pm; Friday 8 am-6 pm; Saturday and Sunday 9 am-5 pm.     Clinic: [phone]   Pharmacy: [phone]            Follow-ups after your visit        Follow-up notes from your care team     Return in about 6 months (around 8/7/2018) for Routine Visit.      Who to contact     If you have questions or need follow up information about today's clinic visit or your schedule please contact [facility] directly at [phone].  Normal or non-critical lab and imaging results will be communicated to you by Livingly Mediahart, letter or phone within 4 business days after the clinic has received the results. If you do not hear from us within 7 days, please contact the clinic through BadSeedt or phone. If you have a critical or abnormal lab result, we will notify you by phone as soon as possible.  Submit refill requests through Spry or call your pharmacy and they will forward the refill request to us. Please allow 3 business days for your refill to be completed.          Additional Information About Your Visit        Livingly Mediahart Information     Spry gives you secure access to your electronic health record. If you see a primary care provider, you can also send messages to your care team and make appointments. If you have questions, please call your primary care clinic.  If you do not have a primary care provider, please call [phone] and they will assist you.        Care EveryWhere ID     This is your Care EveryWhere ID. This could be used by other organizations to access your [place] medical records  [id number]        Your Vitals Were     Pulse Temperature Height Last Period Pulse Oximetry Breastfeeding?    73 97.7  F (36.5  C) (Oral) 5' 1\" (1.549 m) 04/12/2017 100% No    BMI (Body Mass Index)                   39.49 kg/m2            Blood Pressure from Last 3 Encounters:   02/07/18 130/84   01/16/18 " 128/80   10/11/17 126/80    Weight from Last 3 Encounters:   02/07/18 209 lb (94.8 kg)   01/16/18 213 lb (96.6 kg)   10/11/17 206 lb 3.2 oz (93.5 kg)              Today, you had the following     No orders found for display         Where to get your medicines      [place] [facility] [place] [address]  [address], [facility] [address]     Phone:  [phone]     buPROPion 150 MG 24 hr tablet          Primary Care Provider Office Phone # Fax #    Kristine Marie [phone]       [address]  [facility] [address]        Equal Access to Services     LILIANE BUTLER : Elvin craigo Sonereyda, waaxda luqadaha, qaybta kaalmada adeegyada, sadia pizarro . So [facility] [phone].    ATENCIÓN: Si habla español, tiene a new disposición servicios gratuitos de asistencia lingüística. Llame al [phone].    We comply with applicable federal civil rights laws and [place] laws. We do not discriminate on the basis of race, color, national origin, age, disability, sex, sexual orientation, or gender identity.            Thank you!     Thank you for choosing [facility]  for your care. Our goal is always to provide you with excellent care. Hearing back from our patients is one way we can continue to improve our services. Please take a few minutes to complete the written survey that you may receive in the mail after your visit with us. Thank you!             Your Updated Medication List - Protect others around you: Learn how to safely use, store and throw away your medicines at www.disposemymeds.org.          This list is accurate as of 2/7/18  4:37 PM.  Always use your most recent med list.                   Brand Name Dispense Instructions for use Diagnosis    buPROPion 150 MG 24 hr tablet    WELLBUTRIN XL    90 tablet    Take 1 tablet (150 mg) by mouth every morning    Adjustment disorder  with depressed mood       COQ-10 PO      Take 2 tablets by mouth        crisaborole 2 % ointment    EUCRISA    60 g    Apply topically 2 times daily    Eczematous dermatitis of eyelid, unspecified laterality       hydrOXYzine 10 MG tablet    ATARAX    30 tablet    Take 1 tablet by mouth nightly as needed for itching.    Pruritus       losartan 50 MG tablet    COZAAR    90 tablet    Take 1 tablet (50 mg) by mouth daily    Hypertension goal BP (blood pressure) < 140/90       olopatadine HCl 0.2 % Soln    PATADAY    1 Bottle    Place 1 drop into both eyes daily as needed    Allergic conjunctivitis, bilateral       OMEGA-3 FISH OIL PO      Take 2 g by mouth daily        PROBIOTIC DAILY PO           SUMAtriptan 50 MG tablet    IMITREX    9 tablet    Take 1 tablet (50 mg) by mouth at onset of headache for migraine May repeat dose in 2 hours.  Do not exceed 200 mg in 24 hours    Classic migraine with aura       triamcinolone 0.1 % cream    KENALOG    30 g    Apply sparingly to affected area two times daily for 10-14 days.    Atopic dermatitis, unspecified type

## 2018-02-07 NOTE — PATIENT INSTRUCTIONS
At [facility], we strive to deliver an exceptional experience to you, every time we see you.  If you receive a survey in the mail, please send us back your thoughts. We really do value your feedback.    Based on your medical history, these are the current health maintenance/preventive care services that you are due for (some may have been done at this visit.)  Health Maintenance Due   Topic Date Due     DEPRESSION ACTION PLAN Q1 YR  12/19/1984     PAP Q3 YR  08/07/2018         Suggested websites for health information:  Www.[employer].CEINT : Up to date and easily searchable information on multiple topics.  Www.D[employer] Media.gov : medication info, interactive tutorials, watch real surgeries online  Www.familydoctor.org : good info from the Academy of Family Physicians  Www.cdc.gov : public health info, travel advisories, epidemics (H1N1)  Www.aap.org : children's health info, normal development, vaccinations  Www.health.[facility].[place].us : [place] dept of health, public health issues in [place], N1N1    Your care team:                            Family Medicine Internal Medicine   MD Reagan Mathur MD Shantel Branch-Fleming, MD Katya Georgiev PA-C Nam Ho, MD Pediatrics   Dharmesh Guevara PAJULIÁN Martines, MD Michaela Esparza CNP, MD Deborah Mielke, MD Kim Thein, APRN CNP      Clinic hours: Monday - Thursday 7 am-7 pm; Fridays 7 am-5 pm.   Urgent care: Monday - Friday 11 am-9 pm; Saturday and Sunday 9 am-5 pm.  Pharmacy : Monday -Thursday 8 am-8 pm; Friday 8 am-6 pm; Saturday and Sunday 9 am-5 pm.     Clinic: [phone]   Pharmacy: [phone]

## 2018-02-07 NOTE — PROGRESS NOTES
SUBJECTIVE:   Rizwana Aguirre is a 52 year old female who presents to clinic today for the following health issues:    Would like refills on this medication.    Medication Followup of bupropion    Taking Medication as prescribed: yes    Side Effects:  None    Medication Helping Symptoms:  yes   In counseling  Therapist aware of medication use   No suicidal ideation   Sleep a little restless  Feels rested in the morning  Exercise+ 3 times a week   Libido better    Depression Followup    Status since last visit: Improved with medication     See PHQ-9 for current symptoms.  Other associated symptoms: None    Complicating factors:   Significant life event:  No   Current substance abuse:  None  Anxiety or Panic symptoms:  No    PHQ-9 1/16/2018   Total Score 16   Q9: Suicide Ideation Not at all     In the past two weeks have you had thoughts of suicide or self-harm?  No.    Do you have concerns about your personal safety or the safety of others?   No  PHQ-9  English  PHQ-9   Any Language  Suicide Assessment Five-step Evaluation and Treatment (SAFE-T)    Problem list and histories reviewed & adjusted, as indicated.  Additional history: as documented    Patient Active Problem List   Diagnosis     HTN (hypertension), benign     CARDIOVASCULAR SCREENING; LDL GOAL LESS THAN 160     Hypertension goal BP (blood pressure) < 140/90     Vitamin D deficiency     Pruritus     Hyperphoria     Lesion of external ear     Classic migraine with aura     Urinary incontinence, unspecified incontinence type     Functional urinary incontinence     Atopic dermatitis, unspecified type     Atypical ductal hyperplasia of left breast     Mild aortic insufficiency     Non-rheumatic mitral regurgitation     Ductal carcinoma in situ (DCIS) of left breast     Morbid obesity (H)     Past Surgical History:   Procedure Laterality Date     BIOPSY BREAST NEEDLE LOCALIZATION Left 11/14/2016    Procedure: BIOPSY BREAST NEEDLE LOCALIZATION;  Surgeon: Livia Frederick  Huber Cannon MD;  Location: [facility] OR     BREAST BIOPSY, CORE RT/LT Left 10/12/2016     C  DELIVERY ONLY  x2     COLONOSCOPY WITH CO2 INSUFFLATION N/A 2017    Procedure: COLONOSCOPY WITH CO2 INSUFFLATION;  BMI 36.11  Colon cancer screening  Referring Provider-Indira Aldrich  Pharmacy [facility]  Fax [phone]  ;  Surgeon: Duane, William Charles, MD;  Location: [facility] OR       Social History   Substance Use Topics     Smoking status: Never Smoker     Smokeless tobacco: Never Used     Alcohol use 0.0 oz/week      Comment: Social     Family History   Problem Relation Age of Onset     C.A.D. Mother      Hypertension Mother      C.A.D. Father      CANCER Father 40     Melanoma     Hypertension Father      Other Cancer Father      melanoma     CEREBROVASCULAR DISEASE Maternal Grandmother          Current Outpatient Prescriptions   Medication Sig Dispense Refill     buPROPion (WELLBUTRIN XL) 150 MG 24 hr tablet Take 1 tablet (150 mg) by mouth every morning 90 tablet 1     crisaborole (EUCRISA) 2 % ointment Apply topically 2 times daily 60 g 0     losartan (COZAAR) 50 MG tablet Take 1 tablet (50 mg) by mouth daily 90 tablet 1     olopatadine HCl (PATADAY) 0.2 % SOLN Place 1 drop into both eyes daily as needed 1 Bottle 6     Probiotic Product (PROBIOTIC DAILY PO)        triamcinolone (KENALOG) 0.1 % cream Apply sparingly to affected area two times daily for 10-14 days. 30 g 1     Omega-3 Fatty Acids (OMEGA-3 FISH OIL PO) Take 2 g by mouth daily        Coenzyme Q10 (COQ-10 PO) Take 2 tablets by mouth        SUMAtriptan (IMITREX) 50 MG tablet Take 1 tablet (50 mg) by mouth at onset of headache for migraine May repeat dose in 2 hours.  Do not exceed 200 mg in 24 hours 9 tablet 1     hydrOXYzine (ATARAX) 10 MG tablet Take 1 tablet by mouth nightly as needed for itching. 30 tablet 6     [DISCONTINUED] buPROPion (WELLBUTRIN XL) 150 MG 24 hr tablet Take 1 tablet (150 mg) by mouth every morning 30 tablet 1  "    Allergies   Allergen Reactions     Cantaloupe [Melon]      Scratchy throat     Shrimp Nausea     Walnuts [Nuts]      Scratchy throat     BP Readings from Last 3 Encounters:   02/07/18 130/84   01/16/18 128/80   10/11/17 126/80    Wt Readings from Last 3 Encounters:   02/07/18 209 lb (94.8 kg)   01/16/18 213 lb (96.6 kg)   10/11/17 206 lb 3.2 oz (93.5 kg)                  Labs reviewed in EPIC    Reviewed and updated as needed this visit by clinical staff  Tobacco  Allergies  Meds  Med Hx  Surg Hx  Fam Hx  Soc Hx      Reviewed and updated as needed this visit by Provider         ROS:  Constitutional, HEENT, cardiovascular, pulmonary, gi and gu systems are negative, except as otherwise noted.    OBJECTIVE:                                                    /84 (BP Location: Left arm, Patient Position: Chair, Cuff Size: Adult Large)  Pulse 73  Temp 97.7  F (36.5  C) (Oral)  Ht 5' 1\" (1.549 m)  Wt 209 lb (94.8 kg)  LMP 04/12/2017  SpO2 100%  Breastfeeding? No  BMI 39.49 kg/m2  Body mass index is 39.49 kg/(m^2).  GENERAL APPEARANCE: healthy, alert and no distress  NECK: trachea midline and normal to palpation  RESP: lungs clear to auscultation - no rales, rhonchi or wheezes  CV: regular rates and rhythm, normal S1 S2, no S3 or S4 and no murmur, click or rub  ABDOMEN: soft, non-tender  MS: extremities normal- no gross deformities noted  SKIN: no suspicious lesions or rashes  NEURO: Normal strength and tone, mentation intact and speech normal  PSYCH: mentation appears normal and affect normal/bright    Diagnostic test results:  Diagnostic Test Results:  No results found for this or any previous visit (from the past 24 hour(s)).     ASSESSMENT/PLAN:                                                    1. Adjustment disorder with depressed mood  -Doing much better since starting the Wellbutrin  -Continue with exercise  -Continue with therapy (couselor suggested waiting on sex therapy)  - buPROPion " (WELLBUTRIN XL) 150 MG 24 hr tablet; Take 1 tablet (150 mg) by mouth every morning  Dispense: 90 tablet; Refill: 1    2. Hypertension goal BP (blood pressure) < 140/90  -At goal  -continue current medication     3. Morbid obesity (H)  -Has been exercising  -Lost weight since last check    4. Nonspecific abnormal results of liver function study  -No history of liver disease or hepatitis  -Prior LFT checks have been normal  -AST was 58  -Recheck in 6 months, if still high then need to get Ultrasound of the liver       Follow up with Provider - 6 months, sooner if any changes or concerns      Kristine Marie MD MPH    [facility]

## 2018-04-16 ENCOUNTER — MYC MEDICAL ADVICE (OUTPATIENT)
Dept: FAMILY MEDICINE | Facility: CLINIC | Age: 53
End: 2018-04-16

## 2018-04-16 DIAGNOSIS — I10 HYPERTENSION GOAL BP (BLOOD PRESSURE) < 140/90: ICD-10-CM

## 2018-04-16 RX ORDER — LOSARTAN POTASSIUM 50 MG/1
50 TABLET ORAL DAILY
Qty: 90 TABLET | Refills: 1 | Status: SHIPPED | OUTPATIENT
Start: 2018-04-16 | End: 2018-10-01 | Stop reason: DRUGHIGH

## 2018-05-04 ENCOUNTER — TELEPHONE (OUTPATIENT)
Dept: OPTOMETRY | Facility: CLINIC | Age: 53
End: 2018-05-04

## 2018-05-04 ENCOUNTER — OFFICE VISIT (OUTPATIENT)
Dept: FAMILY MEDICINE | Facility: CLINIC | Age: 53
End: 2018-05-04
Payer: COMMERCIAL

## 2018-05-04 VITALS
SYSTOLIC BLOOD PRESSURE: 155 MMHG | HEART RATE: 64 BPM | TEMPERATURE: 98.7 F | OXYGEN SATURATION: 97 % | WEIGHT: 212.8 LBS | BODY MASS INDEX: 40.21 KG/M2 | DIASTOLIC BLOOD PRESSURE: 90 MMHG

## 2018-05-04 DIAGNOSIS — F43.21 ADJUSTMENT DISORDER WITH DEPRESSED MOOD: ICD-10-CM

## 2018-05-04 DIAGNOSIS — R49.9 HOARSENESS OR CHANGING VOICE: Primary | ICD-10-CM

## 2018-05-04 DIAGNOSIS — I10 HYPERTENSION GOAL BP (BLOOD PRESSURE) < 140/90: ICD-10-CM

## 2018-05-04 DIAGNOSIS — E66.01 MORBID OBESITY (H): ICD-10-CM

## 2018-05-04 PROCEDURE — 99214 OFFICE O/P EST MOD 30 MIN: CPT | Performed by: PREVENTIVE MEDICINE

## 2018-05-04 ASSESSMENT — PAIN SCALES - GENERAL: PAINLEVEL: NO PAIN (0)

## 2018-05-04 NOTE — TELEPHONE ENCOUNTER
Patient came into clinic needed a few contacts to get her to next apt, Ordered on 05/04/2018 call patient when they come in N/C

## 2018-05-04 NOTE — MR AVS SNAPSHOT
After Visit Summary   5/4/2018    Rizwana Aguirre    MRN: [id number]           Patient Information     Date Of Birth          1965        Visit Information        Provider Department      5/4/2018 7:40 AM Kristine Marie MD [facility]        Today's Diagnoses     Hoarseness or changing voice    -  1    Hypertension goal BP (blood pressure) < 140/90        Morbid obesity (H)        Adjustment disorder with depressed mood          Care Instructions    At [facility], we strive to deliver an exceptional experience to you, every time we see you.  If you receive a survey in the mail, please send us back your thoughts. We really do value your feedback.    Based on your medical history, these are the current health maintenance/preventive care services that you are due for (some may have been done at this visit.)  Health Maintenance Due   Topic Date Due     DEPRESSION ACTION PLAN Q1 YR  12/19/1984     HIV SCREEN (SYSTEM ASSIGNED)  12/19/1984     PAP Q3 YR  08/07/2018       Suggested websites for health information:  Www.KiteReaders : Up to date and easily searchable information on multiple topics.  Www.medlineplus.gov : medication info, interactive tutorials, watch real surgeries online  Www.familydoctor.org : good info from the Academy of Family Physicians  Www.cdc.gov : public health info, travel advisories, epidemics (H1N1)  Www.aap.org : children's health info, normal development, vaccinations  Www.health.state.[place].us : [place] dept of health, public health issues in [place], N1N1    Your care team:                            Family Medicine Internal Medicine   MD Reagan Mathur MD Shantel Branch-Fleming, MD Katya Georgiev PA-C Megan Hill, ANTONIO Serrano MD Pediatrics   ASHWINI Burger, MD Samira Kirkpatrick APRN CNP   MD Michaela Benjamin MD Deborah Mielke, MD Kim Thein, APRJACOBO [facility]  hours: Monday - Thursday 7 am-7 pm; Fridays 7 am-5 pm.   Urgent care: Monday - Friday 11 am-9 pm; Saturday and Sunday 9 am-5 pm.  Pharmacy : Monday -Thursday 8 am-8 pm; Friday 8 am-6 pm; Saturday and Sunday 9 am-5 pm.     Clinic: [phone]   Pharmacy: [phone]              Follow-ups after your visit        Additional Services     OTOLARYNGOLOGY REFERRAL       Your provider has referred you to: FM[facility]: [facility] [phone]   [email or web address]    Please be aware that coverage of these services is subject to the terms and limitations of your health insurance plan.  Call member services at your health plan with any benefit or coverage questions.      Please bring the following with you to your appointment:    (1) Any X-Rays, CTs or MRIs which have been performed.  Contact the facility where they were done to arrange for  prior to your scheduled appointment.   (2) List of current medications  (3) This referral request   (4) Any documents/labs given to you for this referral                  Who to contact     If you have questions or need follow up information about today's clinic visit or your schedule please contact [facility] directly at [phone].  Normal or non-critical lab and imaging results will be communicated to you by MyChart, letter or phone within 4 business days after the clinic has received the results. If you do not hear from us within 7 days, please contact the clinic through Speed Commercehart or phone. If you have a critical or abnormal lab result, we will notify you by phone as soon as possible.  Submit refill requests through Caterna or call your pharmacy and they will forward the refill request to us. Please allow 3 business days for your refill to be completed.          Additional Information About Your Visit        Speed CommerceharPrepared Response Information     Caterna gives you secure access to your electronic health record.  If you see a primary care provider, you can also send messages to your care team and make appointments. If you have questions, please call your primary care clinic.  If you do not have a primary care provider, please call [phone] and they will assist you.        Care EveryWhere ID     This is your Care EveryWhere ID. This could be used by other organizations to access your [place] medical records  [id number]        Your Vitals Were     Pulse Temperature Last Period Pulse Oximetry BMI (Body Mass Index)       64 98.7  F (37.1  C) (Oral) 04/12/2017 97% 40.21 kg/m2        Blood Pressure from Last 3 Encounters:   05/04/18 155/90   02/07/18 130/84   01/16/18 128/80    Weight from Last 3 Encounters:   05/04/18 212 lb 12.8 oz (96.5 kg)   02/07/18 209 lb (94.8 kg)   01/16/18 213 lb (96.6 kg)              We Performed the Following     OTOLARYNGOLOGY REFERRAL        Primary Care Provider Office Phone # Fax #    Kristine Marie [phone]       [address]  [facility] [address]        Equal Access to Services     RUDY BUTLER : Hadii aad ku hadasho Soestelaali, waaxda luqadaha, qaybta kaalmada adeegyada, sadia mtzn yoseph donis. So [facility] [phone].    ATENCIÓN: Si habla español, tiene a new disposición servicios gratuitos de asistencia lingüística. [facility] [phone].    We comply with applicable federal civil rights laws and [place] laws. We do not discriminate on the basis of race, color, national origin, age, disability, sex, sexual orientation, or gender identity.            Thank you!     Thank you for choosing [facility]  for your care. Our goal is always to provide you with excellent care. Hearing back from our patients is one way we can continue to improve our services. Please take a few minutes to complete the written survey that you may receive in the mail after your visit with us. Thank you!             Your Updated Medication List - Protect  others around you: Learn how to safely use, store and throw away your medicines at www.disposemymeds.org.          This list is accurate as of 5/4/18  8:10 AM.  Always use your most recent med list.                   Brand Name Dispense Instructions for use Diagnosis    buPROPion 150 MG 24 hr tablet    WELLBUTRIN XL    90 tablet    Take 1 tablet (150 mg) by mouth every morning    Adjustment disorder with depressed mood       COQ-10 PO      Take 2 tablets by mouth        crisaborole 2 % ointment    EUCRISA    60 g    Apply topically 2 times daily    Eczematous dermatitis of eyelid, unspecified laterality       hydrOXYzine 10 MG tablet    ATARAX    30 tablet    Take 1 tablet by mouth nightly as needed for itching.    Pruritus       losartan 50 MG tablet    COZAAR    90 tablet    Take 1 tablet (50 mg) by mouth daily    Hypertension goal BP (blood pressure) < 140/90       olopatadine HCl 0.2 % Soln    PATADAY    1 Bottle    Place 1 drop into both eyes daily as needed    Allergic conjunctivitis, bilateral       OMEGA-3 FISH OIL PO      Take 2 g by mouth daily        PROBIOTIC DAILY PO           SUMAtriptan 50 MG tablet    IMITREX    9 tablet    Take 1 tablet (50 mg) by mouth at onset of headache for migraine May repeat dose in 2 hours.  Do not exceed 200 mg in 24 hours    Classic migraine with aura       triamcinolone 0.1 % cream    KENALOG    30 g    Apply sparingly to affected area two times daily for 10-14 days.    Atopic dermatitis, unspecified type

## 2018-05-04 NOTE — PROGRESS NOTES
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 Pt is requesting referral for ENT.    Intensity: moderate    Progression of Symptoms:  worsening    Accompanying Signs & Symptoms:  Pt denies any pain or hoarseness when singing      Previous history of similar problem:   No    Slow gradual change  Bronchitis over a year ago  Since then has noticed symptoms  Sings several times a week  No pain  No dysphagia  No hoarseness  Having to push her voice more, more voice fatigue, voice may fall off  No fever or chills  Some post nasal drainage this time of year  No tobacco use  No vocal cord surgeries       Hypertension Follow-up      Outpatient blood pressures are being checked at [facility].  Results are less than 140/90.    Low Salt Diet: low salt  More salt this week due to travel       Obesity:    Has been doing [facility]    Problem list and histories reviewed & adjusted, as indicated.  Additional history: as documented    Patient Active Problem List   Diagnosis     HTN (hypertension), benign     CARDIOVASCULAR SCREENING; LDL GOAL LESS THAN 160     Hypertension goal BP (blood pressure) < 140/90     Vitamin D deficiency     Pruritus     Hyperphoria     Lesion of external ear     Classic migraine with aura     Urinary incontinence, unspecified incontinence type     Functional urinary incontinence     Atopic dermatitis, unspecified type     Atypical ductal hyperplasia of left breast     Mild aortic insufficiency     Non-rheumatic mitral regurgitation     Ductal carcinoma in situ (DCIS) of left breast     Morbid obesity (H)     Adjustment disorder with depressed mood     Past Surgical History:   Procedure Laterality Date     BIOPSY BREAST NEEDLE LOCALIZATION Left 11/14/2016    Procedure: BIOPSY BREAST NEEDLE  LOCALIZATION;  Surgeon: Livia Frederick MD;  Location: [facility] OR     BREAST BIOPSY, CORE RT/LT Left 10/12/2016     C  DELIVERY ONLY  x2     COLONOSCOPY WITH CO2 INSUFFLATION N/A 2017    Procedure: COLONOSCOPY WITH CO2 INSUFFLATION;  BMI 36.11  Colon cancer screening  Referring Provider-Indira Aldrich  Pharmacy [facility]  Fax [phone]  ;  Surgeon: Duane, William Charles, MD;  Location: [facility] OR       Social History   Substance Use Topics     Smoking status: Never Smoker     Smokeless tobacco: Never Used     Alcohol use 0.0 oz/week      Comment: Social     Family History   Problem Relation Age of Onset     C.A.D. Mother      Hypertension Mother      C.A.D. Father      CANCER Father 40     Melanoma     Hypertension Father      Other Cancer Father      melanoma     CEREBROVASCULAR DISEASE Maternal Grandmother          Current Outpatient Prescriptions   Medication Sig Dispense Refill     buPROPion (WELLBUTRIN XL) 150 MG 24 hr tablet Take 1 tablet (150 mg) by mouth every morning 90 tablet 1     Coenzyme Q10 (COQ-10 PO) Take 2 tablets by mouth        hydrOXYzine (ATARAX) 10 MG tablet Take 1 tablet by mouth nightly as needed for itching. 30 tablet 6     losartan (COZAAR) 50 MG tablet Take 1 tablet (50 mg) by mouth daily 90 tablet 1     olopatadine HCl (PATADAY) 0.2 % SOLN Place 1 drop into both eyes daily as needed 1 Bottle 6     Omega-3 Fatty Acids (OMEGA-3 FISH OIL PO) Take 2 g by mouth daily        Probiotic Product (PROBIOTIC DAILY PO)        SUMAtriptan (IMITREX) 50 MG tablet Take 1 tablet (50 mg) by mouth at onset of headache for migraine May repeat dose in 2 hours.  Do not exceed 200 mg in 24 hours 9 tablet 1     triamcinolone (KENALOG) 0.1 % cream Apply sparingly to affected area two times daily for 10-14 days. 30 g 1     crisaborole (EUCRISA) 2 % ointment Apply topically 2 times daily (Patient not taking: Reported on 2018) 60 g 0     Allergies   Allergen Reactions     Cantaloupe  [Melon]      Scratchy throat     Shrimp Nausea     Walnuts [Nuts]      Scratchy throat     BP Readings from Last 3 Encounters:   05/04/18 155/90   02/07/18 130/84   01/16/18 128/80    Wt Readings from Last 3 Encounters:   05/04/18 212 lb 12.8 oz (96.5 kg)   02/07/18 209 lb (94.8 kg)   01/16/18 213 lb (96.6 kg)                  Labs reviewed in EPIC    Reviewed and updated as needed this visit by clinical staff  Tobacco  Allergies  Meds  Med Hx  Surg Hx  Fam Hx  Soc Hx      Reviewed and updated as needed this visit by Provider         ROS:  Constitutional, HEENT, cardiovascular, pulmonary, gi and gu systems are negative, except as otherwise noted.    OBJECTIVE:                                                    /90 (BP Location: Left arm, Patient Position: Sitting, Cuff Size: Adult Large)  Pulse 64  Temp 98.7  F (37.1  C) (Oral)  Wt 212 lb 12.8 oz (96.5 kg)  LMP 04/12/2017  SpO2 97%  BMI 40.21 kg/m2  Body mass index is 40.21 kg/(m^2).  GENERAL APPEARANCE: healthy, alert and no distress  EYES: Eyes grossly normal to inspection and conjunctivae and sclerae normal  HENT: ear canals and TM's normal and nose and mouth without ulcers or lesions  NECK: trachea midline and normal to palpation  RESP: lungs clear to auscultation - no rales, rhonchi or wheezes  CV: regular rates and rhythm, normal S1 S2, no S3 or S4 and no murmur, click or rub  ABDOMEN: soft, non-tender  MS: extremities normal- no gross deformities noted  SKIN: no suspicious lesions or rashes  NEURO: Normal strength and tone, mentation intact and speech normal  PSYCH: mentation appears normal and affect normal/bright    Diagnostic test results:  Diagnostic Test Results:  No results found for this or any previous visit (from the past 24 hour(s)).     ASSESSMENT/PLAN:                                                    1. Hoarseness or changing voice  -Voice overuse  -Needs ENT evaluation to rule out vocal cord nodules   - OTOLARYNGOLOGY  REFERRAL    2. Hypertension goal BP (blood pressure) < 140/90  -Elevated in clinic today  -Has been normal in the past  -Normal at [facility] as well  -Scheduled for a blood pressure check on Ancillary in 1 week     3. Morbid obesity (H)  -Following [facility] program     4. Adjustment disorder with depressed mood  -Therapy completed  -On Wellbutrin XL      Follow up with Provider - as scheduled      Kristine Marie MD MPH    [facility]

## 2018-05-04 NOTE — PATIENT INSTRUCTIONS
At [facility], we strive to deliver an exceptional experience to you, every time we see you.  If you receive a survey in the mail, please send us back your thoughts. We really do value your feedback.    Based on your medical history, these are the current health maintenance/preventive care services that you are due for (some may have been done at this visit.)  Health Maintenance Due   Topic Date Due     DEPRESSION ACTION PLAN Q1 YR  12/19/1984     HIV SCREEN (SYSTEM ASSIGNED)  12/19/1984     PAP Q3 YR  08/07/2018       Suggested websites for health information:  Www.[employer] : Up to date and easily searchable information on multiple topics.  Www.medlineplus.gov : medication info, interactive tutorials, watch real surgeries online  Www.familydoctor.org : good info from the Academy of Family Physicians  Www.cdc.gov : public health info, travel advisories, epidemics (H1N1)  Www.aap.org : children's health info, normal development, vaccinations  Www.health.[facility].[place].us : [place] dept of health, public health issues in [place], N1N1    Your care team:                            Family Medicine Internal Medicine   MD Reagan Mathur MD Shantel Branch-Fleming, MD Katya Georgiev PA-C Megan Hill, APRN CELENA Serrano MD Pediatrics   Dharmesh Guevara, PAJULIÁN Martines, MD Samira Kirkpatrick APRN CNP   MD Michaela Benjamin MD Deborah Mielke, MD Kim Thein, APRN [facility]      Clinic hours: Monday - Thursday 7 am-7 pm; Fridays 7 am-5 pm.   Urgent care: Monday - Friday 11 am-9 pm; Saturday and Sunday 9 am-5 pm.  Pharmacy : Monday -Thursday 8 am-8 pm; Friday 8 am-6 pm; Saturday and Sunday 9 am-5 pm.     Clinic: [phone]   Pharmacy: [phone]

## 2018-05-10 ENCOUNTER — ALLIED HEALTH/NURSE VISIT (OUTPATIENT)
Dept: NURSING | Facility: CLINIC | Age: 53
End: 2018-05-10
Payer: COMMERCIAL

## 2018-05-10 VITALS — DIASTOLIC BLOOD PRESSURE: 111 MMHG | HEART RATE: 90 BPM | SYSTOLIC BLOOD PRESSURE: 171 MMHG

## 2018-05-10 DIAGNOSIS — I10 HTN (HYPERTENSION), BENIGN: ICD-10-CM

## 2018-05-10 DIAGNOSIS — Z01.30 BP CHECK: Primary | ICD-10-CM

## 2018-05-10 NOTE — TELEPHONE ENCOUNTER
Patient calling in to check status of trails. Contacted [employer] and spoke with Misa who confirmed the trials are in stock. Patient will pick them up today.

## 2018-05-10 NOTE — PROGRESS NOTES
Ancillary BP check    HTN Guidelines:  Age 18-59 BP range:  Less than 140/90  Age 60-85 with Diabetes:  Less than 140/90  Age 60-85 without Diabetes:  less than 150/90        Rizwana Aguirre is a 52 year old female who comes in today for a Blood Pressure check.    Patient is taking medication as prescribed  Patient is tolerating medications well.  Patient is monitoring Blood Pressure at home.  Average readings if yes are -    BP goal: < 140/90mmHg (patient 18+ years of age with or without diabetes)    BP reading today: FAILED     BP Readings from Last 1 Encounters:   05/10/18 (!) 171/111     A large cuff was used.  Pulse: 90    Vitals reviewed     Each reading recorded in vital signs section of chart: yes    Symptoms: none    Need for urgent appointment, based on criteria in ancillary BP workflow (elevated blood pressure and any of the following: dizziness, blurry vision, lightheadedness, severe shortness of breath, chest pain or visual disturbance): Yes (Please explain): Patient was informed that she needed to wait 5-10 min to recheck her blood pressure. She declined and left the clinic without getting her second blood pressure.       Plan: Not at goal without red flags, message routed to provider for further directions and follow up.      Completed by: Tequila Lyle  [facility]

## 2018-05-10 NOTE — Clinical Note
I couldn't do a second BP check because she was upset and had to leave. She was late and unhappy with her check in process.

## 2018-05-10 NOTE — MR AVS SNAPSHOT
After Visit Summary   5/10/2018    Rizwana Aguirre    MRN: [id number]           Patient Information     Date Of Birth          1965        Visit Information        Provider Department      5/10/2018 7:20 AM BK ANCILLARY [facility]        Today's Diagnoses     BP check    -  1    HTN (hypertension), benign           Follow-ups after your visit        Follow-up notes from your care team     Return for BP Recheck.      Your next 10 appointments already scheduled     May 22, 2018  4:15 PM CDT   New Visit with Zain Cox MD   [facility] ([facility])    [address] [place] [address] [id number] [phone]              Who to contact     If you have questions or need follow up information about today's clinic visit or your schedule please contact [facility] directly at [phone].  Normal or non-critical lab and imaging results will be communicated to you by MyChart, letter or phone within 4 business days after the clinic has received the results. If you do not hear from us within 7 days, please contact the clinic through [employer] or phone. If you have a critical or abnormal lab result, we will notify you by phone as soon as possible.  Submit refill requests through [employer] or call your pharmacy and they will forward the refill request to us. Please allow 3 business days for your refill to be completed.          Additional Information About Your Visit        MyChart Information     [employer] gives you secure access to your electronic health record. If you see a primary care provider, you can also send messages to your care team and make appointments. If you have questions, please call your primary care clinic.  If you do not have a primary care provider, please call [phone] and they will assist you.        Care EveryWhere ID     This is your Care EveryWhere ID. This could be used by other  organizations to access your [facility] medical records  [id number]        Your Vitals Were     Pulse Last Period                90 04/12/2017           Blood Pressure from Last 3 Encounters:   05/10/18 (!) 171/111   05/04/18 155/90   02/07/18 130/84    Weight from Last 3 Encounters:   05/04/18 212 lb 12.8 oz (96.5 kg)   02/07/18 209 lb (94.8 kg)   01/16/18 213 lb (96.6 kg)              Today, you had the following     No orders found for display       Primary Care Provider Office Phone # Fax #    Kristine Marie [phone]       [id number] [place]  [facility] [address]        Equal Access to Services     LILIANE BUTLER : Hadii german craigo Sonereyda, waaxda luqadaha, qaybta kaalmada adeegyada, sadia pizarro . So [facility] [phone].    ATENCIÓN: Si habla español, tiene a new disposición servicios gratuitos de asistencia lingüística. [facility] [phone].    We comply with applicable federal civil rights laws and [place] laws. We do not discriminate on the basis of race, color, national origin, age, disability, sex, sexual orientation, or gender identity.            Thank you!     Thank you for choosing [facility]  for your care. Our goal is always to provide you with excellent care. Hearing back from our patients is one way we can continue to improve our services. Please take a few minutes to complete the written survey that you may receive in the mail after your visit with us. Thank you!             Your Updated Medication List - Protect others around you: Learn how to safely use, store and throw away your medicines at www.disposemymeds.org.          This list is accurate as of 5/10/18  8:19 AM.  Always use your most recent med list.                   Brand Name Dispense Instructions for use Diagnosis    buPROPion 150 MG 24 hr tablet    WELLBUTRIN XL    90 tablet    Take 1 tablet (150 mg) by mouth every morning    Adjustment disorder with depressed  mood       COQ-10 PO      Take 2 tablets by mouth        crisaborole 2 % ointment    EUCRISA    60 g    Apply topically 2 times daily    Eczematous dermatitis of eyelid, unspecified laterality       hydrOXYzine 10 MG tablet    ATARAX    30 tablet    Take 1 tablet by mouth nightly as needed for itching.    Pruritus       losartan 50 MG tablet    COZAAR    90 tablet    Take 1 tablet (50 mg) by mouth daily    Hypertension goal BP (blood pressure) < 140/90       olopatadine HCl 0.2 % Soln    PATADAY    1 Bottle    Place 1 drop into both eyes daily as needed    Allergic conjunctivitis, bilateral       OMEGA-3 FISH OIL PO      Take 2 g by mouth daily        PROBIOTIC DAILY PO           SUMAtriptan 50 MG tablet    IMITREX    9 tablet    Take 1 tablet (50 mg) by mouth at onset of headache for migraine May repeat dose in 2 hours.  Do not exceed 200 mg in 24 hours    Classic migraine with aura       triamcinolone 0.1 % cream    KENALOG    30 g    Apply sparingly to affected area two times daily for 10-14 days.    Atopic dermatitis, unspecified type

## 2018-05-22 ENCOUNTER — OFFICE VISIT (OUTPATIENT)
Dept: OTOLARYNGOLOGY | Facility: CLINIC | Age: 53
End: 2018-05-22
Payer: COMMERCIAL

## 2018-05-22 VITALS
HEART RATE: 66 BPM | RESPIRATION RATE: 16 BRPM | WEIGHT: 210 LBS | OXYGEN SATURATION: 98 % | HEIGHT: 61 IN | SYSTOLIC BLOOD PRESSURE: 154 MMHG | BODY MASS INDEX: 39.65 KG/M2 | DIASTOLIC BLOOD PRESSURE: 92 MMHG

## 2018-05-22 DIAGNOSIS — R49.9 CHANGE IN VOICE: Primary | ICD-10-CM

## 2018-05-22 DIAGNOSIS — K21.9 LPRD (LARYNGOPHARYNGEAL REFLUX DISEASE): ICD-10-CM

## 2018-05-22 PROCEDURE — 31575 DIAGNOSTIC LARYNGOSCOPY: CPT | Performed by: OTOLARYNGOLOGY

## 2018-05-22 PROCEDURE — 99204 OFFICE O/P NEW MOD 45 MIN: CPT | Mod: 25 | Performed by: OTOLARYNGOLOGY

## 2018-05-22 RX ORDER — OMEPRAZOLE 40 MG/1
40 CAPSULE, DELAYED RELEASE ORAL DAILY
Qty: 90 CAPSULE | Refills: 1 | Status: SHIPPED | OUTPATIENT
Start: 2018-05-22 | End: 2018-11-13

## 2018-05-22 NOTE — LETTER
5/22/2018         RE: Rizwana Aguirre  [address]  [place] [address]        Dear Colleague,    Thank you for referring your patient, Rizwana Aguirre, to the [facility]. Please see a copy of my visit note below.    History of Present Illness - Rizwana Aguirre is a 52 year old female here to see me for the first time for throat issues at the consultation of Dr. Marie.    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  She usually sings second soprano, or alto.  About one and a half years ago, she had a bad bronchitis and lost her voice.  Since that point her voice does not seem to have been the same.  The illness lasted almost a month and the symptoms went away, but the voice has not been the same.  Interestingly, the main issue is the middle range.  That issue has stayed the same over that time.    No dysphagia at all.  No coughing up blood, no change in speaking voice.  However, well before all this, she did have a few episodes in her adult life where food got stuck in her throat.  Once so severely that she had to to the ER and that was cleared with an effervescent.    Past Medical History -   Patient Active Problem List   Diagnosis     HTN (hypertension), benign     CARDIOVASCULAR SCREENING; LDL GOAL LESS THAN 160     Hypertension goal BP (blood pressure) < 140/90     Vitamin D deficiency     Pruritus     Hyperphoria     Lesion of external ear     Classic migraine with aura     Urinary incontinence, unspecified incontinence type     Functional urinary incontinence     Atopic dermatitis, unspecified type     Atypical ductal hyperplasia of left breast     Mild aortic insufficiency     Non-rheumatic mitral regurgitation     Ductal carcinoma in situ (DCIS) of left breast     Morbid obesity (H)     Adjustment disorder with depressed mood       Current Medications -   Current Outpatient Prescriptions:      buPROPion (WELLBUTRIN XL)  150 MG 24 hr tablet, Take 1 tablet (150 mg) by mouth every morning, Disp: 90 tablet, Rfl: 1     Coenzyme Q10 (COQ-10 PO), Take 2 tablets by mouth , Disp: , Rfl:      crisaborole (EUCRISA) 2 % ointment, Apply topically 2 times daily (Patient not taking: Reported on 5/4/2018), Disp: 60 g, Rfl: 0     hydrOXYzine (ATARAX) 10 MG tablet, Take 1 tablet by mouth nightly as needed for itching., Disp: 30 tablet, Rfl: 6     losartan (COZAAR) 50 MG tablet, Take 1 tablet (50 mg) by mouth daily, Disp: 90 tablet, Rfl: 1     olopatadine HCl (PATADAY) 0.2 % SOLN, Place 1 drop into both eyes daily as needed, Disp: 1 Bottle, Rfl: 6     Omega-3 Fatty Acids (OMEGA-3 FISH OIL PO), Take 2 g by mouth daily , Disp: , Rfl:      Probiotic Product (PROBIOTIC DAILY PO), , Disp: , Rfl:      SUMAtriptan (IMITREX) 50 MG tablet, Take 1 tablet (50 mg) by mouth at onset of headache for migraine May repeat dose in 2 hours.  Do not exceed 200 mg in 24 hours, Disp: 9 tablet, Rfl: 1     triamcinolone (KENALOG) 0.1 % cream, Apply sparingly to affected area two times daily for 10-14 days., Disp: 30 g, Rfl: 1    Allergies -   Allergies   Allergen Reactions     Cantaloupe [Melon]      Scratchy throat     Shrimp Nausea     Walnuts [Nuts]      Scratchy throat       Social History -   Social History     Social History     Marital status:      Spouse name: N/A     Number of children: N/A     Years of education: N/A     Occupational History      [employer]     Social History Main Topics     Smoking status: Never Smoker     Smokeless tobacco: Never Used     Alcohol use 0.0 oz/week      Comment: Social     Drug use: No     Sexual activity: Yes     Partners: Male     Birth control/ protection: Condom     Other Topics Concern     Parent/Sibling W/ Cabg, Mi Or Angioplasty Before 65f 55m? No     Social History Narrative       Family History -   Family History   Problem Relation Age of Onset     C.A.D. Mother      Hypertension Mother      C.A.D. Father      CANCER  "Father 40     Melanoma     Hypertension Father      Other Cancer Father      melanoma     CEREBROVASCULAR DISEASE Maternal Grandmother        Review of Systems - As per HPI and PMHx, otherwise 10+ system review of the head and neck, and general constitution is negative.    Physical Exam  BP (!) 154/92  Pulse 66  Resp 16  Ht 1.549 m (5' 1\")  Wt 95.3 kg (210 lb)  LMP 04/12/2017  SpO2 98%  BMI 39.68 kg/m2    General - The patient is well nourished and well developed, and appears to have good nutritional status.  Alert and oriented to person and place, answers questions and cooperates with examination appropriately.   Head and Face - Normocephalic and atraumatic, with no gross asymmetry noted of the contour of the facial features.  The facial nerve is intact, with strong symmetric movements.  Voice and Breathing - The patient was breathing comfortably without the use of accessory muscles. There was no wheezing, stridor, or stertor.  The patients voice was clear and strong, and had appropriate pitch and quality.  Ears - The tympanic membranes are normal in appearance, bony landmarks are intact.  No retraction, perforation, or masses.  No fluid or purulence was seen in the external canal or the middle ear. No evidence of infection of the middle ear or external canal, cerumen was normal in appearance.  Eyes - Extraocular movements intact, and the pupils were reactive to light.  Sclera were not icteric or injected, conjunctiva were pink and moist.  Mouth - Examination of the oral cavity showed pink, healthy oral mucosa. No lesions or ulcerations noted.  The tongue was mobile and midline, and the dentition were in good condition.    Throat - The walls of the oropharynx were smooth, pink, moist, symmetric, and had no lesions or ulcerations.  The tonsillar pillars and soft palate were symmetric.  The uvula was midline on elevation.    Neck - Normal midline excursion of the laryngotracheal complex during swallowing.  " Full range of motion on passive movement.  Palpation of the occipital, submental, submandibular, internal jugular chain, and supraclavicular nodes did not demonstrate any abnormal lymph nodes or masses.  The carotid pulse was palpable bilaterally.  Palpation of the thyroid was soft and smooth, with no nodules or goiter appreciated.  The trachea was mobile and midline.  Nose - External contour is symmetric, no gross deflection or scars.  Nasal mucosa is pink and moist with no abnormal mucus.  The septum was midline and non-obstructive, turbinates of normal size and position.  No polyps, masses, or purulence noted on examination.      A/P - Rizwana Aguirre is a 52 year old female  (R49.9) Change in voice  (primary encounter diagnosis)  (K21.9) LPRD (laryngopharyngeal reflux disease)    The remainder of today's visit was spent discussing non-medical measures that can help in the management of acid reflux.  Specifically, avoidance of eating before bed, elevating the head of the bed, avoiding large meals, minimizing fatty foods, minimal wine/beer/soda, and eating smaller meals spread out through the day.    Reflux medication will be started today, and I will see the patient back in 4 to 6 weeks for follow up.  I have instructed the patient that if the symptoms are not significantly improved, we may also need to refer for an upper endoscopy, as well as changing the reflux medication, or adding a secondary medication such as sucralfate.      Again, thank you for allowing me to participate in the care of your patient.        Sincerely,        Zain Cox MD

## 2018-05-22 NOTE — PATIENT INSTRUCTIONS
Scheduling Information  To schedule your CT/MRI scan, please contact [facility] at [phone] OR [facility] at [phone]    To schedule your Surgery, please contact our Specialty Schedulers at [phone]      ENT Clinic Locations Clinic Hours Telephone Number     [facility]  [address] [place] [address]   Monday:           1:00pm -- 5:00pm    Friday:              8:00am - 12:00pm   To schedule/reschedule an appointment with   Dr. Cox,   please contact our   Specialty Scheduling Department at:     [phone]       [facility]  [address] [place] [address] Tuesday:          8:00am -- 2:00pm         Urgent Care Locations Clinic Hours Telephone Numbers     [facility]  [address] [place] [address]     Monday-Friday:     11:00am - 9:00pm    Saturday-Sunday:  9:00am - 5:00pm   [phone]     [facility]  [address] [place] [address]     Monday-Friday:      5:00pm - 9:00pm     Saturday-Sunday:  9:00am - 5:00pm   [phone]

## 2018-05-22 NOTE — MR AVS SNAPSHOT
After Visit Summary   5/22/2018    Rizwana Aguirre    MRN: [id number]           Patient Information     Date Of Birth          1965        Visit Information        Provider Department      5/22/2018 4:15 PM Zain Cox MD [facility]        Today's Diagnoses     Change in voice    -  1    LPRD (laryngopharyngeal reflux disease)          Care Instructions    Scheduling Information  To schedule your CT/MRI scan, please contact [facility] at [phone] OR [facility] at [phone]    To schedule your Surgery, please contact our Specialty Schedulers at [phone]      ENT Clinic Locations Clinic Hours Telephone Number     [facility]  [address]  [place] [address]   Monday:           1:00pm -- 5:00pm    Friday:              8:00am - 12:00pm   To schedule/reschedule an appointment with   Dr. Cox,   please contact our   Specialty Scheduling Department at:     [phone]       [facility]  [address] [place] [address] Tuesday:          8:00am -- 2:00pm         Urgent Care Locations Clinic Hours Telephone Numbers     [facility]  [address] [place] [address]     Monday-Friday:     11:00am - 9:00pm    Saturday-Sunday:  9:00am - 5:00pm   [phone]     [facility]  [address] [place] [address]     Monday-Friday:      5:00pm - 9:00pm     Saturday-Sunday:  9:00am - 5:00pm   [phone]                 Follow-ups after your visit        Who to contact     If you have questions or need follow up information about today's clinic visit or your schedule please contact [facility] directly at [phone].  Normal or non-critical lab and imaging results will be communicated to you by MyChart, letter or phone within 4 business days after the clinic has received the results. If you do not hear from us within 7 days, please contact the clinic through MyChart or phone.  "If you have a critical or abnormal lab result, we will notify you by phone as soon as possible.  Submit refill requests through [employer] or call your pharmacy and they will forward the refill request to us. Please allow 3 business days for your refill to be completed.          Additional Information About Your Visit        [employer] Information     [employer] gives you secure access to your electronic health record. If you see a primary care provider, you can also send messages to your care team and make appointments. If you have questions, please call your primary care clinic.  If you do not have a primary care provider, please call [phone] and they will assist you.        Care EveryWhere ID     This is your Care EveryWhere ID. This could be used by other organizations to access your [place] medical records  [id number]        Your Vitals Were     Pulse Respirations Height Last Period Pulse Oximetry BMI (Body Mass Index)    66 16 1.549 m (5' 1\") 04/12/2017 98% 39.68 kg/m2       Blood Pressure from Last 3 Encounters:   05/22/18 (!) 154/92   05/10/18 (!) 171/111   05/04/18 155/90    Weight from Last 3 Encounters:   05/22/18 95.3 kg (210 lb)   05/04/18 96.5 kg (212 lb 12.8 oz)   02/07/18 94.8 kg (209 lb)              We Performed the Following     Laryngoscopy, Fiber          Today's Medication Changes          These changes are accurate as of 5/22/18  4:41 PM.  If you have any questions, ask your nurse or doctor.               Start taking these medicines.        Dose/Directions    omeprazole 40 MG capsule   Commonly known as:  priLOSEC   Used for:  LPRD (laryngopharyngeal reflux disease), Change in voice        Dose:  40 mg   Take 1 capsule (40 mg) by mouth daily Take 30-60 minutes before a meal.   Quantity:  90 capsule   Refills:  1            Where to get your medicines      These medications were sent to [facility] [address] [place] [facility] [address] [facility]  [address] [facility] [address]    "  Phone:  [phone]     omeprazole 40 MG capsule                Primary Care Provider Office Phone # Fax #    Kristine Marie [phone]       [address]  [facility] [address]        Equal Access to Services     LILIANE BUTLER : Hadii german ku hadmarthao Soomaali, waaxda luqadaha, qaybta kaalmada adeegyada, waxay idiin haywenn adeem reynoldsashley donis. So [facility] [phone].    ATENCIÓN: Si habla español, tiene a new disposición servicios gratuitos de asistencia lingüística. Llame al [phone].    We comply with applicable federal civil rights laws and [place] laws. We do not discriminate on the basis of race, color, national origin, age, disability, sex, sexual orientation, or gender identity.            Thank you!     Thank you for choosing [facility]  for your care. Our goal is always to provide you with excellent care. Hearing back from our patients is one way we can continue to improve our services. Please take a few minutes to complete the written survey that you may receive in the mail after your visit with us. Thank you!             Your Updated Medication List - Protect others around you: Learn how to safely use, store and throw away your medicines at www.disposemymeds.org.          This list is accurate as of 5/22/18  4:41 PM.  Always use your most recent med list.                   Brand Name Dispense Instructions for use Diagnosis    buPROPion 150 MG 24 hr tablet    WELLBUTRIN XL    90 tablet    Take 1 tablet (150 mg) by mouth every morning    Adjustment disorder with depressed mood       COQ-10 PO      Take 2 tablets by mouth        crisaborole 2 % ointment    EUCRISA    60 g    Apply topically 2 times daily    Eczematous dermatitis of eyelid, unspecified laterality       hydrOXYzine 10 MG tablet    ATARAX    30 tablet    Take 1 tablet by mouth nightly as needed for itching.    Pruritus       losartan 50 MG tablet    COZAAR    90 tablet    Take 1 tablet (50 mg) by  mouth daily    Hypertension goal BP (blood pressure) < 140/90       olopatadine HCl 0.2 % Soln    PATADAY    1 Bottle    Place 1 drop into both eyes daily as needed    Allergic conjunctivitis, bilateral       OMEGA-3 FISH OIL PO      Take 2 g by mouth daily        omeprazole 40 MG capsule    priLOSEC    90 capsule    Take 1 capsule (40 mg) by mouth daily Take 30-60 minutes before a meal.    LPRD (laryngopharyngeal reflux disease), Change in voice       PROBIOTIC DAILY PO           SUMAtriptan 50 MG tablet    IMITREX    9 tablet    Take 1 tablet (50 mg) by mouth at onset of headache for migraine May repeat dose in 2 hours.  Do not exceed 200 mg in 24 hours    Classic migraine with aura       triamcinolone 0.1 % cream    KENALOG    30 g    Apply sparingly to affected area two times daily for 10-14 days.    Atopic dermatitis, unspecified type

## 2018-05-22 NOTE — PROGRESS NOTES
History of Present Illness - Rizwana Aguirre is a 52 year old female here to see me for the first time for throat issues at the consultation of Dr. Marie.    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  She usually sings second soprano, or alto.  About one and a half years ago, she had a bad bronchitis and lost her voice.  Since that point her voice does not seem to have been the same.  The illness lasted almost a month and the symptoms went away, but the voice has not been the same.  Interestingly, the main issue is the middle range.  That issue has stayed the same over that time.    No dysphagia at all.  No coughing up blood, no change in speaking voice.  However, well before all this, she did have a few episodes in her adult life where food got stuck in her throat.  Once so severely that she had to to the ER and that was cleared with an effervescent.    Past Medical History -   Patient Active Problem List   Diagnosis     HTN (hypertension), benign     CARDIOVASCULAR SCREENING; LDL GOAL LESS THAN 160     Hypertension goal BP (blood pressure) < 140/90     Vitamin D deficiency     Pruritus     Hyperphoria     Lesion of external ear     Classic migraine with aura     Urinary incontinence, unspecified incontinence type     Functional urinary incontinence     Atopic dermatitis, unspecified type     Atypical ductal hyperplasia of left breast     Mild aortic insufficiency     Non-rheumatic mitral regurgitation     Ductal carcinoma in situ (DCIS) of left breast     Morbid obesity (H)     Adjustment disorder with depressed mood       Current Medications -   Current Outpatient Prescriptions:      buPROPion (WELLBUTRIN XL) 150 MG 24 hr tablet, Take 1 tablet (150 mg) by mouth every morning, Disp: 90 tablet, Rfl: 1     Coenzyme Q10 (COQ-10 PO), Take 2 tablets by mouth , Disp: , Rfl:      crisaborole (EUCRISA) 2 % ointment, Apply topically 2 times daily (Patient not  taking: Reported on 5/4/2018), Disp: 60 g, Rfl: 0     hydrOXYzine (ATARAX) 10 MG tablet, Take 1 tablet by mouth nightly as needed for itching., Disp: 30 tablet, Rfl: 6     losartan (COZAAR) 50 MG tablet, Take 1 tablet (50 mg) by mouth daily, Disp: 90 tablet, Rfl: 1     olopatadine HCl (PATADAY) 0.2 % SOLN, Place 1 drop into both eyes daily as needed, Disp: 1 Bottle, Rfl: 6     Omega-3 Fatty Acids (OMEGA-3 FISH OIL PO), Take 2 g by mouth daily , Disp: , Rfl:      Probiotic Product (PROBIOTIC DAILY PO), , Disp: , Rfl:      SUMAtriptan (IMITREX) 50 MG tablet, Take 1 tablet (50 mg) by mouth at onset of headache for migraine May repeat dose in 2 hours.  Do not exceed 200 mg in 24 hours, Disp: 9 tablet, Rfl: 1     triamcinolone (KENALOG) 0.1 % cream, Apply sparingly to affected area two times daily for 10-14 days., Disp: 30 g, Rfl: 1    Allergies -   Allergies   Allergen Reactions     Cantaloupe [Melon]      Scratchy throat     Shrimp Nausea     Walnuts [Nuts]      Scratchy throat       Social History -   Social History     Social History     Marital status:      Spouse name: N/A     Number of children: N/A     Years of education: N/A     Occupational History      [employer]     Social History Main Topics     Smoking status: Never Smoker     Smokeless tobacco: Never Used     Alcohol use 0.0 oz/week      Comment: Social     Drug use: No     Sexual activity: Yes     Partners: Male     Birth control/ protection: Condom     Other Topics Concern     Parent/Sibling W/ Cabg, Mi Or Angioplasty Before 65f 55m? No     Social History Narrative       Family History -   Family History   Problem Relation Age of Onset     C.A.D. Mother      Hypertension Mother      C.A.D. Father      CANCER Father 40     Melanoma     Hypertension Father      Other Cancer Father      melanoma     CEREBROVASCULAR DISEASE Maternal Grandmother        Review of Systems - As per HPI and PMHx, otherwise 10+ system review of the head and neck, and general  "constitution is negative.    Physical Exam  BP (!) 154/92  Pulse 66  Resp 16  Ht 1.549 m (5' 1\")  Wt 95.3 kg (210 lb)  LMP 04/12/2017  SpO2 98%  BMI 39.68 kg/m2    General - The patient is well nourished and well developed, and appears to have good nutritional status.  Alert and oriented to person and place, answers questions and cooperates with examination appropriately.   Head and Face - Normocephalic and atraumatic, with no gross asymmetry noted of the contour of the facial features.  The facial nerve is intact, with strong symmetric movements.  Voice and Breathing - The patient was breathing comfortably without the use of accessory muscles. There was no wheezing, stridor, or stertor.  The patients voice was clear and strong, and had appropriate pitch and quality.  Ears - The tympanic membranes are normal in appearance, bony landmarks are intact.  No retraction, perforation, or masses.  No fluid or purulence was seen in the external canal or the middle ear. No evidence of infection of the middle ear or external canal, cerumen was normal in appearance.  Eyes - Extraocular movements intact, and the pupils were reactive to light.  Sclera were not icteric or injected, conjunctiva were pink and moist.  Mouth - Examination of the oral cavity showed pink, healthy oral mucosa. No lesions or ulcerations noted.  The tongue was mobile and midline, and the dentition were in good condition.    Throat - The walls of the oropharynx were smooth, pink, moist, symmetric, and had no lesions or ulcerations.  The tonsillar pillars and soft palate were symmetric.  The uvula was midline on elevation.    Neck - Normal midline excursion of the laryngotracheal complex during swallowing.  Full range of motion on passive movement.  Palpation of the occipital, submental, submandibular, internal jugular chain, and supraclavicular nodes did not demonstrate any abnormal lymph nodes or masses.  The carotid pulse was palpable bilaterally.  " Palpation of the thyroid was soft and smooth, with no nodules or goiter appreciated.  The trachea was mobile and midline.  Nose - External contour is symmetric, no gross deflection or scars.  Nasal mucosa is pink and moist with no abnormal mucus.  The septum was midline and non-obstructive, turbinates of normal size and position.  No polyps, masses, or purulence noted on examination.      A/P - Rizwana Aguirre is a 52 year old female  (R49.9) Change in voice  (primary encounter diagnosis)  (K21.9) LPRD (laryngopharyngeal reflux disease)    The remainder of today's visit was spent discussing non-medical measures that can help in the management of acid reflux.  Specifically, avoidance of eating before bed, elevating the head of the bed, avoiding large meals, minimizing fatty foods, minimal wine/beer/soda, and eating smaller meals spread out through the day.    Reflux medication will be started today, and I will see the patient back in 4 to 6 weeks for follow up.  I have instructed the patient that if the symptoms are not significantly improved, we may also need to refer for an upper endoscopy, as well as changing the reflux medication, or adding a secondary medication such as sucralfate.

## 2018-06-19 ENCOUNTER — OFFICE VISIT (OUTPATIENT)
Dept: OPTOMETRY | Facility: CLINIC | Age: 53
End: 2018-06-19
Payer: COMMERCIAL

## 2018-06-19 DIAGNOSIS — H10.13 ALLERGIC CONJUNCTIVITIS OF BOTH EYES: ICD-10-CM

## 2018-06-19 DIAGNOSIS — H52.13 MYOPIA OF BOTH EYES: Primary | ICD-10-CM

## 2018-06-19 DIAGNOSIS — H52.4 PRESBYOPIA: ICD-10-CM

## 2018-06-19 PROBLEM — H25.13 NUCLEAR SCLEROSIS OF BOTH EYES: Status: ACTIVE | Noted: 2018-06-19

## 2018-06-19 PROCEDURE — 92015 DETERMINE REFRACTIVE STATE: CPT | Performed by: OPTOMETRIST

## 2018-06-19 PROCEDURE — 92014 COMPRE OPH EXAM EST PT 1/>: CPT | Performed by: OPTOMETRIST

## 2018-06-19 RX ORDER — AZELASTINE HYDROCHLORIDE 0.5 MG/ML
1 SOLUTION/ DROPS OPHTHALMIC 2 TIMES DAILY
Qty: 1 BOTTLE | Refills: 11 | Status: SHIPPED | OUTPATIENT
Start: 2018-06-19 | End: 2019-10-09

## 2018-06-19 ASSESSMENT — REFRACTION_WEARINGRX
OS_VBASE: DOWN
OS_ADD: +2.50
OD_ADD: +2.50
OS_SPHERE: -6.00
OD_VPRISM: 1.0
OD_SPHERE: -6.25
OD_SPHERE: -5.75
OS_ADD: +2.50
OS_VPRISM: 1.0
OD_VBASE: UP
OS_SPHERE: -6.00
OS_AXIS: 140
OS_VPRISM: 1.0
OS_CYLINDER: +0.25
SPECS_TYPE: PAL
OS_VBASE: DOWN
OD_VBASE: UP
OS_AXIS: 140
OD_ADD: +2.50
OS_CYLINDER: +0.25
OD_VPRISM: 1.0

## 2018-06-19 ASSESSMENT — REFRACTION_CURRENTRX
OD_SPHERE: -5.25
OD_BRAND: DAILIES AQUA COMFORT PLUS MULTIFOCAL
OD_BASECURVE: 8.70
OS_DIAMETER: 14.0
OS_SPHERE: -5.25
OS_BRAND: DAILIES AQUA COMFORT PLUS
OS_BRAND: DAILIES AQUA COMFORT PLUS
OD_DIAMETER: 14.0
OD_BASECURVE: 8.70
OD_SPHERE: -3.75
OD_DIAMETER: 14.0
OD_BRAND: DAILIES AQUA COMFORT PLUS
OS_BASECURVE: 8.70
OS_DIAMETER: 14.0
OS_SPHERE: -5.25
OD_ADD: +2.00
OS_BASECURVE: 8.70

## 2018-06-19 ASSESSMENT — CUP TO DISC RATIO
OS_RATIO: 0.2
OD_RATIO: 0.2

## 2018-06-19 ASSESSMENT — REFRACTION_MANIFEST
OD_ADD: +2.50
OS_SPHERE: -5.75
OS_AXIS: 135
OS_ADD: +2.50
OD_CYLINDER: SPHERE
OD_SPHERE: -5.75
OS_CYLINDER: +0.50

## 2018-06-19 ASSESSMENT — VISUAL ACUITY
OD_CC: 20/60
OD_CC: 20/20
CORRECTION_TYPE: GLASSES
OS_CC: 20/30-1
METHOD: SNELLEN - LINEAR
OS_CC: 20/20
OD_CC+: -1
OD_SC: 20/400-
OS_SC: 20/400-

## 2018-06-19 ASSESSMENT — EXTERNAL EXAM - LEFT EYE: OS_EXAM: NORMAL

## 2018-06-19 ASSESSMENT — REFRACTION_FINALRX
OS_VPRISM: 1.0
OD_VPRISM: 1.0

## 2018-06-19 ASSESSMENT — SLIT LAMP EXAM - LIDS
COMMENTS: NORMAL
COMMENTS: NORMAL

## 2018-06-19 ASSESSMENT — TONOMETRY
IOP_METHOD: TONOPEN
OS_IOP_MMHG: 15
OD_IOP_MMHG: 17

## 2018-06-19 ASSESSMENT — CONF VISUAL FIELD
OS_NORMAL: 1
OD_NORMAL: 1

## 2018-06-19 ASSESSMENT — EXTERNAL EXAM - RIGHT EYE: OD_EXAM: NORMAL

## 2018-06-19 NOTE — PATIENT INSTRUCTIONS
Eyeglass prescription given.    Dispensed trial of monovision right eye.  If acceptable then schedule cl fit.    Return in 1 year for a complete eye exam or sooner if needed.    Joshua Rothman, GENET    The affects of the dilating drops last for 4- 6 hours.  You will be more sensitive to light and vision will be blurry up close.  Mydriatic sunglasses were given if needed.      Optometry Providers       Clinic Locations                                 Telephone Number   Dr. Ashley Mosquera [facility] [place]   [place] [phone]     [facility] Hours:                [facility] Hours:       [facility] Hours:   [address]   [address]      [address] [place] [address]   [place] [address]    [place] [address]  Phone: [phone]                    Phone: [phone]     Phone: [phone]                      Monday 8:00-7:00                          Monday 8:00-7:00                          Monday 8:00-7:00              Tuesday 8:00-6:00                          Tuesday 8:00-7:00                          Tuesday 8:00-7:00              Wednesday 8:00-6:00                  Wednesday 8:00-7:00                   Wednesday 8:00-7:00      Thursday 8:00-6:00                        Thursday 8:00-7:00                         Thursday 8:00-7:00            Friday 8:00-5:00                              Friday 8:00-5:00                              Friday 8:00-5:00    [facility] Hours:   [address] [place] [address]  [phone]    Monday 8:00-7:00  Tuesday 8:00-7:00  Wednesday 8:00-7:00  Thursday 8:00-7:00  Friday 8:00-5:00  Please log on to [email or web address] to order your contact lenses.  The link is found on the [facility] page.  As always, Thank you for trusting us with your health care needs!

## 2018-06-19 NOTE — LETTER
6/19/2018         RE: Rizwana Aguirre  [address]  [facility] [address]        Dear Colleague,    Thank you for referring your patient, Rizwana Aguirre, to the [facility]. Please see a copy of my visit note below.    Chief Complaint   Patient presents with     COMPREHENSIVE EYE EXAM     more contacts fit fee ok if needed         Last Eye Exam: 6-6-2017  Dilated Previously: Yes    What are you currently using to see?  glasses and contacts       Distance Vision Acuity: Noticed gradual change in both eyes    Near Vision Acuity: Not satisfied     Eye Comfort: itchy and teary  Do you use eye drops? 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     When wearing cl's has to wear cheaters in order to read.         Medical, surgical and family histories reviewed and updated 6/19/2018.       OBJECTIVE: See Ophthalmology exam    ASSESSMENT:    ICD-10-CM    1. Myopia of both eyes H52.13 REFRACTION   2. Presbyopia H52.4 REFRACTION   3. Allergic conjunctivitis of both eyes H10.13 EYE EXAM (SIMPLE-NONBILLABLE)     azelastine (OPTIVAR) 0.05 % SOLN ophthalmic solution      PLAN:     Patient Instructions   Eyeglass prescription given.    Dispensed trial of monovision right eye.  If acceptable then schedule cl fit.    Return in 1 year for a complete eye exam or sooner if needed.    Joshua Rothman, GENET              Again, thank you for allowing me to participate in the care of your patient.        Sincerely,        Joshua Rothman, OD

## 2018-06-19 NOTE — MR AVS SNAPSHOT
After Visit Summary   6/19/2018    Rizwana Aguirre    MRN: [id number]           Patient Information     Date Of Birth          1965        Visit Information        Provider Department      6/19/2018 12:00 PM Joshua Rothman OD [facility]        Today's Diagnoses     Myopia of both eyes    -  1    Presbyopia        Allergic conjunctivitis of both eyes          Care Instructions    Eyeglass prescription given.    Dispensed trial of monovision right eye.  If acceptable then schedule cl fit.    Return in 1 year for a complete eye exam or sooner if needed.    Joshua Rothman, GENET    The affects of the dilating drops last for 4- 6 hours.  You will be more sensitive to light and vision will be blurry up close.  Mydriatic sunglasses were given if needed.      Optometry Providers       Clinic Locations                                 Telephone Number   Dr. Ashley Mosquera [facility] [place]   [place] [phone]     [facility] Hours:                [facility] Hours:       [facility] Hours:   [address]   [address] [address]     [address] [place] [address]   [place] [address]    [place] [address]  Phone: [phone]                    Phone: [phone]     Phone: [phone]                      Monday 8:00-7:00                          Monday 8:00-7:00                          Monday 8:00-7:00              Tuesday 8:00-6:00                          Tuesday 8:00-7:00                          Tuesday 8:00-7:00              Wednesday 8:00-6:00                  Wednesday 8:00-7:00                   Wednesday 8:00-7:00      Thursday 8:00-6:00                        Thursday 8:00-7:00                         Thursday 8:00-7:00            Friday 8:00-5:00                              Friday 8:00-5:00                              Friday  8:00-5:00    [facility] Hours:   [address] [place] [address]  [phone]    Monday 8:00-7:00  Tuesday 8:00-7:00  Wednesday 8:00-7:00  Thursday 8:00-7:00  Friday 8:00-5:00  Please log on to [email or web address] to order your contact lenses.  The link is found on the [facility] Care [facility] Vision Services page.  As always, Thank you for trusting us with your health care needs!                 Follow-ups after your visit        Follow-up notes from your care team     Return in about 1 year (around 6/19/2019) for Annual Visit.      Who to contact     If you have questions or need follow up information about today's clinic visit or your schedule please contact [facility] directly at [phone].  Normal or non-critical lab and imaging results will be communicated to you by MyChart, letter or phone within 4 business days after the clinic has received the results. If you do not hear from us within 7 days, please contact the clinic through Leohart or phone. If you have a critical or abnormal lab result, we will notify you by phone as soon as possible.  Submit refill requests through FantasyBook or call your pharmacy and they will forward the refill request to us. Please allow 3 business days for your refill to be completed.          Additional Information About Your Visit        MyChart Information     FantasyBook gives you secure access to your electronic health record. If you see a primary care provider, you can also send messages to your care team and make appointments. If you have questions, please call your primary care clinic.  If you do not have a primary care provider, please call [phone] and they will assist you.        Care EveryWhere ID     This is your Care EveryWhere ID. This could be used by other organizations to access your [facility] medical records  [id number]        Your Vitals Were     Last Period                   04/12/2017            Blood Pressure from Last 3 Encounters:    05/22/18 (!) 154/92   05/10/18 (!) 171/111   05/04/18 155/90    Weight from Last 3 Encounters:   05/22/18 95.3 kg (210 lb)   05/04/18 96.5 kg (212 lb 12.8 oz)   02/07/18 94.8 kg (209 lb)              We Performed the Following     EYE EXAM (SIMPLE-NONBILLABLE)     REFRACTION          Today's Medication Changes          These changes are accurate as of 6/19/18  1:15 PM.  If you have any questions, ask your nurse or doctor.               Start taking these medicines.        Dose/Directions    azelastine 0.05 % Soln ophthalmic solution   Commonly known as:  OPTIVAR   Used for:  Allergic conjunctivitis of both eyes   Started by:  Joshua Rothman OD        Dose:  1 drop   Apply 1 drop to eye 2 times daily   Quantity:  1 Bottle   Refills:  11         Stop taking these medicines if you haven't already. Please contact your care team if you have questions.     olopatadine HCl 0.2 % Soln   Commonly known as:  PATADAY   Stopped by:  Joshua Rothman OD                Where to get your medicines      These medications were sent to [place] [address] [place] [facility] [address] [facility]  [id number] [place] [address]     Phone:  [phone]     azelastine 0.05 % Soln ophthalmic solution                Primary Care Provider Office Phone # Fax #    Kristine Marie [phone]       [address]  [facility] [address]        Equal Access to Services     [facility] AH: Hadii aad ku hadasho Soomaali, waaxda luqadaha, qaybta kaalmada adeegyada, waxay lily pizarro . So [facility] [phone].    ATENCIÓN: Si habla larry, tiene a new disposición servicios gratuitos de asistencia lingüística. Llame al [phone].    We comply with applicable federal civil rights laws and [place] laws. We do not discriminate on the basis of race, color, national origin, age, disability, sex, sexual orientation, or gender identity.            Thank you!     Thank you for choosing [facility]  for your care. Our goal is always to provide you with excellent care. Hearing back from our patients is one way we can continue to improve our services. Please take a few minutes to complete the written survey that you may receive in the mail after your visit with us. Thank you!             Your Updated Medication List - Protect others around you: Learn how to safely use, store and throw away your medicines at www.disposemymeds.org.          This list is accurate as of 6/19/18  1:15 PM.  Always use your most recent med list.                   Brand Name Dispense Instructions for use Diagnosis    azelastine 0.05 % Soln ophthalmic solution    OPTIVAR    1 Bottle    Apply 1 drop to eye 2 times daily    Allergic conjunctivitis of both eyes       buPROPion 150 MG 24 hr tablet    WELLBUTRIN XL    90 tablet    Take 1 tablet (150 mg) by mouth every morning    Adjustment disorder with depressed mood       COQ-10 PO      Take 2 tablets by mouth        crisaborole 2 % ointment    EUCRISA    60 g    Apply topically 2 times daily    Eczematous dermatitis of eyelid, unspecified laterality       hydrOXYzine 10 MG tablet    ATARAX    30 tablet    Take 1 tablet by mouth nightly as needed for itching.    Pruritus       losartan 50 MG tablet    COZAAR    90 tablet    Take 1 tablet (50 mg) by mouth daily    Hypertension goal BP (blood pressure) < 140/90       OMEGA-3 FISH OIL PO      Take 2 g by mouth daily        omeprazole 40 MG capsule    priLOSEC    90 capsule    Take 1 capsule (40 mg) by mouth daily Take 30-60 minutes before a meal.    LPRD (laryngopharyngeal reflux disease), Change in voice       PROBIOTIC DAILY PO           SUMAtriptan 50 MG tablet    IMITREX    9 tablet    Take 1 tablet (50 mg) by mouth at onset of headache for migraine May repeat dose in 2 hours.  Do not exceed 200 mg in 24 hours    Classic migraine with aura       triamcinolone 0.1 % cream    KENALOG    30 g    Apply sparingly to  affected area two times daily for 10-14 days.    Atopic dermatitis, unspecified type

## 2018-06-19 NOTE — PROGRESS NOTES
Chief Complaint   Patient presents with     COMPREHENSIVE EYE EXAM     more contacts fit fee ok if needed         Last Eye Exam: 6-6-2017  Dilated Previously: Yes    What are you currently using to see?  glasses and contacts       Distance Vision Acuity: Noticed gradual change in both eyes    Near Vision Acuity: Not satisfied     Eye Comfort: itchy and teary  Do you use eye drops? 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     When wearing cl's has to wear cheaters in order to read.         Medical, surgical and family histories reviewed and updated 6/19/2018.       OBJECTIVE: See Ophthalmology exam    ASSESSMENT:    ICD-10-CM    1. Myopia of both eyes H52.13 REFRACTION   2. Presbyopia H52.4 REFRACTION   3. Allergic conjunctivitis of both eyes H10.13 EYE EXAM (SIMPLE-NONBILLABLE)     azelastine (OPTIVAR) 0.05 % SOLN ophthalmic solution      PLAN:     Patient Instructions   Eyeglass prescription given.    Dispensed trial of monovision right eye.  If acceptable then schedule cl fit.    Return in 1 year for a complete eye exam or sooner if needed.    Joshua Rothman, OD

## 2018-06-28 ENCOUNTER — TELEPHONE (OUTPATIENT)
Dept: OPTOMETRY | Facility: CLINIC | Age: 53
End: 2018-06-28

## 2018-07-02 ENCOUNTER — OFFICE VISIT (OUTPATIENT)
Dept: OPTOMETRY | Facility: CLINIC | Age: 53
End: 2018-07-02
Payer: COMMERCIAL

## 2018-07-02 ENCOUNTER — MYC MEDICAL ADVICE (OUTPATIENT)
Dept: FAMILY MEDICINE | Facility: CLINIC | Age: 53
End: 2018-07-02

## 2018-07-02 DIAGNOSIS — H52.13 MYOPIA OF BOTH EYES: Primary | ICD-10-CM

## 2018-07-02 PROCEDURE — 92310 CONTACT LENS FITTING OU: CPT | Performed by: OPTOMETRIST

## 2018-07-02 ASSESSMENT — REFRACTION_CURRENTRX
OD_BRAND: DAILIES AQUA COMFORT PLUS
OD_DIAMETER: 14.0
OS_BASECURVE: 8.70
OS_SPHERE: -5.25
OS_BRAND: DAILIES AQUA COMFORT PLUS
OS_DIAMETER: 14.0
OD_BASECURVE: 8.70
OD_SPHERE: -3.75

## 2018-07-02 ASSESSMENT — REFRACTION_WEARINGRX
OS_AXIS: 135
OS_SPHERE: -5.75
OD_CYLINDER: SPHERE
OS_ADD: +2.50
OS_VBASE: DOWN
OD_SPHERE: -5.75
OD_VPRISM: 1.0
OS_VPRISM: 1.0
OS_CYLINDER: +0.50
OD_VBASE: UP
OD_ADD: +2.50

## 2018-07-02 ASSESSMENT — REFRACTION_FINALRX
OD_VPRISM: 1.0
OS_VPRISM: 1.0
OS_VPRISM: 1.0
OD_VPRISM: 1.0

## 2018-07-02 NOTE — PROGRESS NOTES
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          Medical, surgical and family histories reviewed and updated 7/2/2018.       OBJECTIVE: See Ophthalmology exam    ASSESSMENT:    ICD-10-CM    1. Myopia of both eyes H52.13 CONTACT LENS FITTING,BILAT      PLAN:    Patient Instructions   Contact lens prescription given.    Eyeglass prescription given for glasses over contacts for increased distance and near vision.    Return in 1 year for a complete eye exam or sooner if needed.    Joshua Rothman, OD

## 2018-07-02 NOTE — LETTER
7/2/2018         RE: Rizwana Aguirre  [address] [facility] [address] [facility] [address]        Dear Colleague,    Thank you for referring your patient, Rizwana Aguirre, to the [facility]. Please see a copy of my visit note below.    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          Medical, surgical and family histories reviewed and updated 7/2/2018.       OBJECTIVE: See Ophthalmology exam    ASSESSMENT:    ICD-10-CM    1. Myopia of both eyes H52.13 CONTACT LENS FITTING,BILAT      PLAN:    Patient Instructions   Contact lens prescription given.    Eyeglass prescription given for glasses over contacts for increased distance and near vision.    Return in 1 year for a complete eye exam or sooner if needed.    Joshua Rothman, OD                       Again, thank you for allowing me to participate in the care of your patient.        Sincerely,        Joshua Rothman, OD

## 2018-07-02 NOTE — PATIENT INSTRUCTIONS
Contact lens prescription given.    Eyeglass prescription given for glasses over contacts for increased distance and near vision.    Return in 1 year for a complete eye exam or sooner if needed.    Joshua Rothman, OD

## 2018-07-02 NOTE — MR AVS SNAPSHOT
After Visit Summary   7/2/2018    Rizwana Aguirre    MRN: [id number]           Patient Information     Date Of Birth          1965        Visit Information        Provider Department      7/2/2018 8:20 AM Joshua Rothman, GENET [facility]        Today's Diagnoses     Myopia of both eyes    -  1      Care Instructions    Contact lens prescription given.    Eyeglass prescription given for glasses over contacts for increased distance and near vision.    Return in 1 year for a complete eye exam or sooner if needed.    Joshua Rothman OD            Follow-ups after your visit        Follow-up notes from your care team     Return in about 1 year (around 7/2/2019) for Annual Visit.      Who to contact     If you have questions or need follow up information about today's clinic visit or your schedule please contact [facility] directly at [phone].  Normal or non-critical lab and imaging results will be communicated to you by MyChart, letter or phone within 4 business days after the clinic has received the results. If you do not hear from us within 7 days, please contact the clinic through MyChart or phone. If you have a critical or abnormal lab result, we will notify you by phone as soon as possible.  Submit refill requests through [email or web address] or call your pharmacy and they will forward the refill request to us. Please allow 3 business days for your refill to be completed.          Additional Information About Your Visit        MyChart Information     Reclip.It gives you secure access to your electronic health record. If you see a primary care provider, you can also send messages to your care team and make appointments. If you have questions, please call your primary care clinic.  If you do not have a primary care provider, please call [phone] and they will assist you.        Care EveryWhere ID     This is your Care EveryWhere ID. This could be used by other organizations to  access your [facility] medical records  [id number]        Your Vitals Were     Last Period                   04/12/2017            Blood Pressure from Last 3 Encounters:   05/22/18 (!) 154/92   05/10/18 (!) 171/111   05/04/18 155/90    Weight from Last 3 Encounters:   05/22/18 95.3 kg (210 lb)   05/04/18 96.5 kg (212 lb 12.8 oz)   02/07/18 94.8 kg (209 lb)              We Performed the Following     CONTACT LENS FITTING,BILAT        Primary Care Provider Office Phone # Fax #    Kristine Marie [phone]       [address]  [facility] [address]        Equal Access to Services     LILIANE BUTLER : Hadii aad ku hadasho Soomaali, waaxda luqadaha, qaybta kaalmada adeegyada, waxay michelin hayrishabh pizarro . So [facility] [phone].    ATENCIÓN: Si habla español, tiene a new disposición servicios gratuitos de asistencia lingüística. Llame al [phone].    We comply with applicable federal civil rights laws and [place] laws. We do not discriminate on the basis of race, color, national origin, age, disability, sex, sexual orientation, or gender identity.            Thank you!     Thank you for choosing [facility]  for your care. Our goal is always to provide you with excellent care. Hearing back from our patients is one way we can continue to improve our services. Please take a few minutes to complete the written survey that you may receive in the mail after your visit with us. Thank you!             Your Updated Medication List - Protect others around you: Learn how to safely use, store and throw away your medicines at www.disposemymeds.org.          This list is accurate as of 7/2/18  8:45 AM.  Always use your most recent med list.                   Brand Name Dispense Instructions for use Diagnosis    azelastine 0.05 % Soln ophthalmic solution    OPTIVAR    1 Bottle    Apply 1 drop to eye 2 times daily    Allergic conjunctivitis of both eyes       buPROPion 150 MG 24 hr  tablet    WELLBUTRIN XL    90 tablet    Take 1 tablet (150 mg) by mouth every morning    Adjustment disorder with depressed mood       COQ-10 PO      Take 2 tablets by mouth        crisaborole 2 % ointment    EUCRISA    60 g    Apply topically 2 times daily    Eczematous dermatitis of eyelid, unspecified laterality       hydrOXYzine 10 MG tablet    ATARAX    30 tablet    Take 1 tablet by mouth nightly as needed for itching.    Pruritus       losartan 50 MG tablet    COZAAR    90 tablet    Take 1 tablet (50 mg) by mouth daily    Hypertension goal BP (blood pressure) < 140/90       OMEGA-3 FISH OIL PO      Take 2 g by mouth daily        omeprazole 40 MG capsule    priLOSEC    90 capsule    Take 1 capsule (40 mg) by mouth daily Take 30-60 minutes before a meal.    LPRD (laryngopharyngeal reflux disease), Change in voice       PROBIOTIC DAILY PO           SUMAtriptan 50 MG tablet    IMITREX    9 tablet    Take 1 tablet (50 mg) by mouth at onset of headache for migraine May repeat dose in 2 hours.  Do not exceed 200 mg in 24 hours    Classic migraine with aura       triamcinolone 0.1 % cream    KENALOG    30 g    Apply sparingly to affected area two times daily for 10-14 days.    Atopic dermatitis, unspecified type

## 2018-07-02 NOTE — TELEPHONE ENCOUNTER
E-visit instructions given to Rizwana to further discuss BP/increase medications.     Wanda Loyola RN, BSN

## 2018-07-15 DIAGNOSIS — F43.21 ADJUSTMENT DISORDER WITH DEPRESSED MOOD: ICD-10-CM

## 2018-07-15 NOTE — TELEPHONE ENCOUNTER
"Requested Prescriptions   Pending Prescriptions Disp Refills     buPROPion (WELLBUTRIN XL) 150 MG 24 hr tablet [Pharmacy Med Name: BUPROPION HCL  MG TABLET] 90 tablet 0    Last Written Prescription Date:  2/7/18  Last Fill Quantity: 90,  # refills: 1   Last Office Visit with [facility], [facility] or [facility] prescribing provider:  5/4/2018     Future Office Visit:      Sig: TAKE ONE TABLET BY MOUTH ONCE DAILY IN THE MORNING    SSRIs Protocol Passed    7/15/2018  1:57 AM       Passed - Recent (12 mo) or future (30 days) visit within the authorizing provider's specialty    Patient had office visit in the last 12 months or has a visit in the next 30 days with authorizing provider or within the authorizing provider's specialty.  See \"Patient Info\" tab in inbasket, or \"Choose Columns\" in Meds & Orders section of the refill encounter.           Passed - Medication is Bupropion    If the medication is Bupropion (Wellbutrin), and the patient is taking for smoking cessation; OK to refill.         Passed - Patient is age 18 or older       Passed - No active pregnancy on record       Passed - No positive pregnancy test in last 12 months          "

## 2018-07-17 RX ORDER — BUPROPION HYDROCHLORIDE 150 MG/1
TABLET ORAL
Qty: 90 TABLET | Refills: 0 | Status: SHIPPED | OUTPATIENT
Start: 2018-07-17 | End: 2018-10-12

## 2018-07-17 NOTE — TELEPHONE ENCOUNTER
Prescription approved per [facility] Refill Protocol.  Perlita Blackmon RN

## 2018-08-06 ENCOUNTER — MYC REFILL (OUTPATIENT)
Dept: FAMILY MEDICINE | Facility: CLINIC | Age: 53
End: 2018-08-06

## 2018-08-06 DIAGNOSIS — L20.9 ATOPIC DERMATITIS, UNSPECIFIED TYPE: ICD-10-CM

## 2018-08-07 RX ORDER — TRIAMCINOLONE ACETONIDE 1 MG/G
CREAM TOPICAL
Qty: 30 G | Refills: 1 | Status: SHIPPED | OUTPATIENT
Start: 2018-08-07 | End: 2020-09-30

## 2018-08-07 NOTE — TELEPHONE ENCOUNTER
Message from MyChart:  Original authorizing provider: ASHWINI Arndtie Timothy would like a refill of the following medications:  triamcinolone (KENALOG) 0.1 % cream [Indira Aldrich PA-C]    Preferred pharmacy: [facility] [address] [facility] [address] [facility]    Comment:

## 2018-08-07 NOTE — TELEPHONE ENCOUNTER
Sent patient a My Chart message that Rx was sent to her pharmacy.  Shelby Kumar MA/  For Teams Biju

## 2018-09-29 ENCOUNTER — TELEPHONE (OUTPATIENT)
Dept: FAMILY MEDICINE | Facility: CLINIC | Age: 53
End: 2018-09-29

## 2018-09-29 DIAGNOSIS — I10 HYPERTENSION GOAL BP (BLOOD PRESSURE) < 140/90: ICD-10-CM

## 2018-09-29 NOTE — TELEPHONE ENCOUNTER
"Requested Prescriptions   Pending Prescriptions Disp Refills     losartan (COZAAR) 50 MG tablet [Pharmacy Med Name: LOSARTAN POTASSIUM 50 MG TAB] 90 tablet 1     Sig: TAKE 1 TABLET (50 MG) BY MOUTH DAILY    Angiotensin-II Receptors Failed    9/29/2018  1:28 AM       Failed - Blood pressure under 140/90 in past 12 months    BP Readings from Last 3 Encounters:   05/22/18 (!) 154/92   05/10/18 (!) 171/111   05/04/18 155/90                Passed - Recent (12 mo) or future (30 days) visit within the authorizing provider's specialty    Patient had office visit in the last 12 months or has a visit in the next 30 days with authorizing provider or within the authorizing provider's specialty.  See \"Patient Info\" tab in inbasket, or \"Choose Columns\" in Meds & Orders section of the refill encounter.      Last Written Prescription Date:  4/16/18  Last Fill Quantity: 90,  # refills: 1   Last office visit: 5/4/2018 with prescribing provider:     Future Office Visit:           Passed - Patient is age 18 or older       Passed - No active pregnancy on record       Passed - Normal serum creatinine on file in past 12 months    Recent Labs   Lab Test  01/16/18   0836   CR  0.63            Passed - Normal serum potassium on file in past 12 months    Recent Labs   Lab Test  01/16/18   0836   POTASSIUM  3.9                   Passed - No positive pregnancy test in past 12 months        .r4xd    "

## 2018-10-01 RX ORDER — LOSARTAN POTASSIUM 100 MG/1
100 TABLET ORAL DAILY
Qty: 90 TABLET | Refills: 1 | Status: SHIPPED | OUTPATIENT
Start: 2018-10-01 | End: 2018-10-26

## 2018-10-01 RX ORDER — LOSARTAN POTASSIUM 50 MG/1
TABLET ORAL
Qty: 90 TABLET | Refills: 1 | OUTPATIENT
Start: 2018-10-01

## 2018-10-01 NOTE — TELEPHONE ENCOUNTER
Routing refill request to provider for review/approval because:  Labs out of range:  Blood pressure    Qing Donnelly RN

## 2018-10-02 NOTE — TELEPHONE ENCOUNTER
Noted high Blood pressure readings. I increased Losartan from 50 mg to 100 mg and sent in a new script. Please let patient know about the updated dose and she also needs to be scheduled for Ancillary blood pressure check in one week after starting the new dose. If not at goal then, will need to follow up in clinic.    Thanks,    Kristine Marie MD MPH

## 2018-10-02 NOTE — TELEPHONE ENCOUNTER
This writer attempted to contact Jerri on 10/02/18      Reason for call message below and left detailed message.      If patient calls back:   Relay message below, (read verbatim), document that pt called and close encounter        Cherelle Núñez

## 2018-10-02 NOTE — TELEPHONE ENCOUNTER
Patient  returned call    Best number to reach caller: Home number on file [phone] (home)    Is it ok to leave a detailed message: YES     Verbatim was read. Okay to close encounter. Thanks.

## 2018-10-02 NOTE — TELEPHONE ENCOUNTER
This writer attempted to contact Jerri on 10/02/18      Reason for call medication and appointment and left message.      If patient calls back:   Relay message below, (read verbatim), document that pt called and close encounter        Cherelle Núñez

## 2018-10-08 ENCOUNTER — HEALTH MAINTENANCE LETTER (OUTPATIENT)
Age: 53
End: 2018-10-08

## 2018-10-12 DIAGNOSIS — F43.21 ADJUSTMENT DISORDER WITH DEPRESSED MOOD: ICD-10-CM

## 2018-10-12 NOTE — TELEPHONE ENCOUNTER
"Requested Prescriptions   Pending Prescriptions Disp Refills     buPROPion (WELLBUTRIN XL) 150 MG 24 hr tablet [Pharmacy Med Name: BUPROPION HCL  MG TABLET]  Last Written Prescription Date:  07/17/18  Last Fill Quantity: 90,  # refills: 0   Last Office Visit with [facility]ITU, [facility]ANNA[facility] or [facility] prescribing provider:  05/04/18Jimmie   Future Office Visit:    90 tablet 0     Sig: TAKE ONE TABLET BY MOUTH ONCE DAILY IN THE MORNING    SSRIs Protocol Passed    10/12/2018  1:38 AM       Passed - Recent (12 mo) or future (30 days) visit within the authorizing provider's specialty    Patient had office visit in the last 12 months or has a visit in the next 30 days with authorizing provider or within the authorizing provider's specialty.  See \"Patient Info\" tab in inbasket, or \"Choose Columns\" in Meds & Orders section of the refill encounter.           Passed - Medication is Bupropion    If the medication is Bupropion (Wellbutrin), and the patient is taking for smoking cessation; OK to refill.         Passed - Patient is age 18 or older       Passed - No active pregnancy on record       Passed - No positive pregnancy test in last 12 months          "

## 2018-10-16 RX ORDER — BUPROPION HYDROCHLORIDE 150 MG/1
TABLET ORAL
Qty: 30 TABLET | Refills: 0 | Status: SHIPPED | OUTPATIENT
Start: 2018-10-16 | End: 2018-10-26

## 2018-10-16 NOTE — TELEPHONE ENCOUNTER
Please schedule patient. Mari refill given.   Needs appointment and updated PHQ-9 for further refills.   Shagufta Brady RN

## 2018-10-16 NOTE — TELEPHONE ENCOUNTER
This writer attempted to contact patient on 10/16/18      Reason for call Left voicemail message regarding michael refill given and needs an appt for more and left a voicemail message.      If patient calls back:   Schedule Office Visit appointment within 2 weeks with PCP, postponing message.        Shelby Kumar MA

## 2018-10-17 NOTE — TELEPHONE ENCOUNTER
This writer attempted to contact Patient on 10/17/18      Reason for call Patient needs an office visit before the next refill is needed and left a voicemail message and My Chart      If patient calls back:   Schedule Office Visit appointment within 2 weeks with PCP, document that pt called and close encounter         Shelby Kumar MA

## 2018-10-18 DIAGNOSIS — R49.9 CHANGE IN VOICE: ICD-10-CM

## 2018-10-18 DIAGNOSIS — K21.9 LPRD (LARYNGOPHARYNGEAL REFLUX DISEASE): ICD-10-CM

## 2018-10-18 NOTE — LETTER
Rizwana Aguirre  [address] [facility] [address]          10/22/18      Dear Rizwana Aguirre        At [facility] we care about your health and are committed to providing quality patient care. Regular appointments are a vital part of the care and management of your health and can help prevent many of the complications that can occur.      It has come to our attention that you are due for a follow up visit with ENT. Please call [facility] at [phone] soon to schedule your appointment.    If you have transferred care to another clinic please call to inform us so that we do not continue to send you reminder letters.      Sincerely,      [facility] Care Team

## 2018-10-18 NOTE — TELEPHONE ENCOUNTER
"Requested Prescriptions   Pending Prescriptions Disp Refills     omeprazole (PRILOSEC) 40 MG capsule 90 capsule 1    Last Written Prescription Date:  5/22/18  Last Fill Quantity: 90,  # refills: 1   Last office visit: 5/4/2018 with prescribing provider:  Frank   Future Office Visit:     Sig: Take 1 capsule (40 mg) by mouth daily Take 30-60 minutes before a meal.    PPI Protocol Passed    10/18/2018 10:45 AM       Passed - Not on Clopidogrel (unless Pantoprazole ordered)       Passed - No diagnosis of osteoporosis on record       Passed - Recent (12 mo) or future (30 days) visit within the authorizing provider's specialty    Patient had office visit in the last 12 months or has a visit in the next 30 days with authorizing provider or within the authorizing provider's specialty.  See \"Patient Info\" tab in inbasket, or \"Choose Columns\" in Meds & Orders section of the refill encounter.             Passed - Patient is age 18 or older       Passed - No active pregnacy on record       Passed - No positive pregnancy test in past 12 months          "

## 2018-10-19 ENCOUNTER — RADIANT APPOINTMENT (OUTPATIENT)
Dept: MAMMOGRAPHY | Facility: CLINIC | Age: 53
End: 2018-10-19
Attending: PREVENTIVE MEDICINE
Payer: COMMERCIAL

## 2018-10-19 DIAGNOSIS — Z12.31 VISIT FOR SCREENING MAMMOGRAM: ICD-10-CM

## 2018-10-19 PROCEDURE — 77067 SCR MAMMO BI INCL CAD: CPT | Performed by: RADIOLOGY

## 2018-10-19 PROCEDURE — 77063 BREAST TOMOSYNTHESIS BI: CPT | Performed by: RADIOLOGY

## 2018-10-19 RX ORDER — OMEPRAZOLE 40 MG/1
40 CAPSULE, DELAYED RELEASE ORAL DAILY
Qty: 90 CAPSULE | Refills: 1 | OUTPATIENT
Start: 2018-10-19

## 2018-10-19 NOTE — TELEPHONE ENCOUNTER
Routing refill request to provider for review/approval because:  Prescribed last by specialty  Shagufta Brady, RN

## 2018-10-19 NOTE — TELEPHONE ENCOUNTER
Per 's note from may 2018, patient was supposed to follow up in 4-6 weeks, and if not better EGD was planned.  Kristine Marie MD MPH

## 2018-10-22 NOTE — TELEPHONE ENCOUNTER
This writer attempted to contact patient on 10/22/18      Reason for call Patient to follow up with ENT and left a voicemail message and sent letter.      If patient calls back:   Schedule Office Visit appointment within 1 week with specialty ENT.        Shelby Kumar MA

## 2018-10-26 ENCOUNTER — OFFICE VISIT (OUTPATIENT)
Dept: FAMILY MEDICINE | Facility: CLINIC | Age: 53
End: 2018-10-26
Payer: COMMERCIAL

## 2018-10-26 ENCOUNTER — RESULT FOLLOW UP (OUTPATIENT)
Dept: FAMILY MEDICINE | Facility: CLINIC | Age: 53
End: 2018-10-26

## 2018-10-26 VITALS
DIASTOLIC BLOOD PRESSURE: 76 MMHG | HEART RATE: 66 BPM | WEIGHT: 218.6 LBS | OXYGEN SATURATION: 99 % | TEMPERATURE: 97.9 F | RESPIRATION RATE: 14 BRPM | HEIGHT: 61 IN | BODY MASS INDEX: 41.27 KG/M2 | SYSTOLIC BLOOD PRESSURE: 130 MMHG

## 2018-10-26 DIAGNOSIS — Z23 NEED FOR PROPHYLACTIC VACCINATION AND INOCULATION AGAINST INFLUENZA: ICD-10-CM

## 2018-10-26 DIAGNOSIS — I10 HYPERTENSION GOAL BP (BLOOD PRESSURE) < 140/90: Primary | ICD-10-CM

## 2018-10-26 DIAGNOSIS — Z12.4 CERVICAL CANCER SCREENING: ICD-10-CM

## 2018-10-26 DIAGNOSIS — F43.21 ADJUSTMENT DISORDER WITH DEPRESSED MOOD: ICD-10-CM

## 2018-10-26 DIAGNOSIS — E66.01 MORBID OBESITY (H): ICD-10-CM

## 2018-10-26 DIAGNOSIS — D05.12 DUCTAL CARCINOMA IN SITU (DCIS) OF LEFT BREAST: ICD-10-CM

## 2018-10-26 PROCEDURE — G0145 SCR C/V CYTO,THINLAYER,RESCR: HCPCS | Performed by: PREVENTIVE MEDICINE

## 2018-10-26 PROCEDURE — 99214 OFFICE O/P EST MOD 30 MIN: CPT | Mod: 25 | Performed by: PREVENTIVE MEDICINE

## 2018-10-26 PROCEDURE — 87624 HPV HI-RISK TYP POOLED RSLT: CPT | Performed by: PREVENTIVE MEDICINE

## 2018-10-26 PROCEDURE — 90686 IIV4 VACC NO PRSV 0.5 ML IM: CPT | Performed by: PREVENTIVE MEDICINE

## 2018-10-26 PROCEDURE — 90471 IMMUNIZATION ADMIN: CPT | Performed by: PREVENTIVE MEDICINE

## 2018-10-26 RX ORDER — LOSARTAN POTASSIUM 100 MG/1
100 TABLET ORAL DAILY
Qty: 90 TABLET | Refills: 3 | Status: SHIPPED | OUTPATIENT
Start: 2018-10-26 | End: 2019-02-19

## 2018-10-26 RX ORDER — BUPROPION HYDROCHLORIDE 150 MG/1
TABLET ORAL
Qty: 90 TABLET | Refills: 3 | Status: SHIPPED | OUTPATIENT
Start: 2018-10-26 | End: 2019-11-11

## 2018-10-26 ASSESSMENT — ANXIETY QUESTIONNAIRES
GAD7 TOTAL SCORE: 2
5. BEING SO RESTLESS THAT IT IS HARD TO SIT STILL: NOT AT ALL
IF YOU CHECKED OFF ANY PROBLEMS ON THIS QUESTIONNAIRE, HOW DIFFICULT HAVE THESE PROBLEMS MADE IT FOR YOU TO DO YOUR WORK, TAKE CARE OF THINGS AT HOME, OR GET ALONG WITH OTHER PEOPLE: NOT DIFFICULT AT ALL
2. NOT BEING ABLE TO STOP OR CONTROL WORRYING: SEVERAL DAYS
1. FEELING NERVOUS, ANXIOUS, OR ON EDGE: NOT AT ALL
3. WORRYING TOO MUCH ABOUT DIFFERENT THINGS: SEVERAL DAYS
6. BECOMING EASILY ANNOYED OR IRRITABLE: NOT AT ALL
7. FEELING AFRAID AS IF SOMETHING AWFUL MIGHT HAPPEN: NOT AT ALL

## 2018-10-26 ASSESSMENT — PATIENT HEALTH QUESTIONNAIRE - PHQ9
5. POOR APPETITE OR OVEREATING: NOT AT ALL
SUM OF ALL RESPONSES TO PHQ QUESTIONS 1-9: 3

## 2018-10-26 ASSESSMENT — PAIN SCALES - GENERAL: PAINLEVEL: NO PAIN (0)

## 2018-10-26 NOTE — PROGRESS NOTES
SUBJECTIVE:   Rizwana Aguirre is a 52 year old female who presents to clinic today for the following health issues:      Hypertension Follow-up      Outpatient blood pressures are being checked at [facility].  Results are pretty good - pt brought in results.    Low Salt Diet: low salt    Depression and Anxiety Follow-Up    Status since last visit: No change    Other associated symptoms:None    Complicating factors:     Significant life event: No     Current substance abuse: None    PHQ 1/16/2018   PHQ-9 Total Score 16   Q9: Suicide Ideation Not at all     SUSHIL-7 SCORE 1/16/2018   Total Score 5     In the past two weeks have you had thoughts of suicide or self-harm?  No.    Do you have concerns about your personal safety or the safety of others?   No  PHQ-9  English  PHQ-9   Any Language  SUSHIL-7  Suicide Assessment Five-step Evaluation and Treatment (SAFE-T)      Amount of exercise or physical activity: 2 days weekly    Problems taking medications regularly: No    Medication side effects: none    Diet: low salt, [facility]      Problem list and histories reviewed & adjusted, as indicated.  Additional history: as documented    Patient Active Problem List   Diagnosis     HTN (hypertension), benign     CARDIOVASCULAR SCREENING; LDL GOAL LESS THAN 160     Hypertension goal BP (blood pressure) < 140/90     Vitamin D deficiency     Pruritus     Hyperphoria     Lesion of external ear     Classic migraine with aura     Urinary incontinence, unspecified incontinence type     Functional urinary incontinence     Atopic dermatitis, unspecified type     Atypical ductal hyperplasia of left breast     Mild aortic insufficiency     Non-rheumatic mitral regurgitation     Ductal carcinoma in situ (DCIS) of left breast     Morbid obesity (H)     Adjustment disorder with depressed mood     Nuclear sclerosis of both eyes     Past Surgical History:   Procedure Laterality Date     BIOPSY BREAST NEEDLE LOCALIZATION Left 11/14/2016    Procedure:  BIOPSY BREAST NEEDLE LOCALIZATION;  Surgeon: Livia Frederick MD;  Location: [facility] OR     BREAST BIOPSY, CORE RT/LT Left 10/12/2016     C  DELIVERY ONLY  x2     COLONOSCOPY WITH CO2 INSUFFLATION N/A 2017    Procedure: COLONOSCOPY WITH CO2 INSUFFLATION;  BMI 36.11  Colon cancer screening  Referring Provider-Indira Aldrich  Pharmacy [facility]  Fax [phone]  ;  Surgeon: Duane, William Charles, MD;  Location: [facility] OR       Social History   Substance Use Topics     Smoking status: Never Smoker     Smokeless tobacco: Never Used     Alcohol use 0.0 oz/week      Comment: Social     Family History   Problem Relation Age of Onset     C.A.D. Mother      Hypertension Mother      C.A.D. Father      Cancer Father 40     Melanoma     Hypertension Father      Other Cancer Father      melanoma     Cerebrovascular Disease Maternal Grandmother          Current Outpatient Prescriptions   Medication Sig Dispense Refill     azelastine (OPTIVAR) 0.05 % SOLN ophthalmic solution Apply 1 drop to eye 2 times daily 1 Bottle 11     buPROPion (WELLBUTRIN XL) 150 MG 24 hr tablet TAKE ONE TABLET BY MOUTH ONCE DAILY IN THE MORNING 90 tablet 3     Coenzyme Q10 (COQ-10 PO) Take 2 tablets by mouth        losartan (COZAAR) 100 MG tablet Take 1 tablet (100 mg) by mouth daily 90 tablet 3     Omega-3 Fatty Acids (OMEGA-3 FISH OIL PO) Take 2 g by mouth daily        omeprazole (PRILOSEC) 40 MG capsule Take 1 capsule (40 mg) by mouth daily Take 30-60 minutes before a meal. 90 capsule 1     SUMAtriptan (IMITREX) 50 MG tablet Take 1 tablet (50 mg) by mouth at onset of headache for migraine May repeat dose in 2 hours.  Do not exceed 200 mg in 24 hours 9 tablet 1     triamcinolone (KENALOG) 0.1 % cream Apply sparingly to affected area two times daily for 10-14 days. 30 g 1     [DISCONTINUED] buPROPion (WELLBUTRIN XL) 150 MG 24 hr tablet TAKE ONE TABLET BY MOUTH ONCE DAILY IN THE MORNING 30 tablet 0     [DISCONTINUED] losartan  "(COZAAR) 100 MG tablet Take 1 tablet (100 mg) by mouth daily 90 tablet 1     Allergies   Allergen Reactions     Cantaloupe [Melon]      Scratchy throat     Shrimp Nausea     Walnuts [Nuts]      Scratchy throat     BP Readings from Last 3 Encounters:   10/26/18 130/76   05/22/18 (!) 154/92   05/10/18 (!) 171/111    Wt Readings from Last 3 Encounters:   10/26/18 218 lb 9.6 oz (99.2 kg)   05/22/18 210 lb (95.3 kg)   05/04/18 212 lb 12.8 oz (96.5 kg)                  Labs reviewed in EPIC    Reviewed and updated as needed this visit by clinical staff  Tobacco  Allergies  Meds  Med Hx  Surg Hx  Fam Hx  Soc Hx      Reviewed and updated as needed this visit by Provider         ROS:  Constitutional, neuro, ENT, endocrine, pulmonary, cardiac, gastrointestinal, genitourinary, musculoskeletal, integument and psychiatric systems are negative, except as otherwise noted.    OBJECTIVE:                                                    /76  Pulse 66  Temp 97.9  F (36.6  C) (Oral)  Resp 14  Ht 5' 1\" (1.549 m)  Wt 218 lb 9.6 oz (99.2 kg)  LMP 04/12/2017  SpO2 99%  BMI 41.3 kg/m2  Body mass index is 41.3 kg/(m^2).  GENERAL APPEARANCE: healthy, alert and no distress  EYES: Eyes grossly normal to inspection and conjunctivae and sclerae normal  RESP: lungs clear to auscultation - no rales, rhonchi or wheezes  CV: regular rates and rhythm, normal S1 S2, no S3 or S4 and no murmur, click or rub  ABDOMEN: soft, non-tender   (female): normal cervix, adnexae, and uterus without masses or discharge  MS: extremities normal- no gross deformities noted  SKIN: no suspicious lesions or rashes  NEURO: Normal strength and tone, mentation intact and speech normal  PSYCH: mentation appears normal and affect normal/bright    Diagnostic test results:  Diagnostic Test Results:  No results found for this or any previous visit (from the past 24 hour(s)).     ASSESSMENT/PLAN:                                                    1. " Hypertension goal BP (blood pressure) < 140/90  -At goal  -continue current medication   - losartan (COZAAR) 100 MG tablet; Take 1 tablet (100 mg) by mouth daily  Dispense: 90 tablet; Refill: 3    2. Need for prophylactic vaccination and inoculation against influenza  - FLU VACCINE, SPLIT VIRUS, IM (QUADRIVALENT) [30822]- >3 YRS  - Vaccine Administration, Initial [64379]    3. Morbid obesity (H)  -Is on medifast     4. Ductal carcinoma in situ (DCIS) of left breast  -left breast 2016  -Mammogram normal 10/18    5. Cervical cancer screening  -No past abnormal pap smears  -Screening guidelines reviewed   - Pap imaged thin layer screen with HPV - recommended age 30 - 65 years (select HPV order below)  - HPV High Risk Types DNA Cervical    6. Adjustment disorder with depressed mood  -refills on medication provided   - buPROPion (WELLBUTRIN XL) 150 MG 24 hr tablet; TAKE ONE TABLET BY MOUTH ONCE DAILY IN THE MORNING  Dispense: 90 tablet; Refill: 3      Follow up with Provider - 6 months with a My Chart visit, will need to come in for a lab only visit at that time      Kristine Marie MD MPH    [facility]      Injectable Influenza Immunization Documentation    1.  Is the person to be vaccinated sick today?   No    2. Does the person to be vaccinated have an allergy to a component   of the vaccine?   No  Egg Allergy Algorithm Link    3. Has the person to be vaccinated ever had a serious reaction   to influenza vaccine in the past?   No    4. Has the person to be vaccinated ever had Guillain-Barré syndrome?   No    Form completed by Donald East MA

## 2018-10-26 NOTE — MR AVS SNAPSHOT
After Visit Summary   10/26/2018    Rizwana Aguirre    MRN: [id number]           Patient Information     Date Of Birth          1965        Visit Information        Provider Department      10/26/2018 3:00 PM Kristine Marie MD [facility]        Today's Diagnoses     Hypertension goal BP (blood pressure) < 140/90    -  1    Need for prophylactic vaccination and inoculation against influenza        Morbid obesity (H)        Ductal carcinoma in situ (DCIS) of left breast        Cervical cancer screening        Adjustment disorder with depressed mood           Follow-ups after your visit        Follow-up notes from your care team     Return in about 6 months (around 4/26/2019) for Lab Work.      Your next 10 appointments already scheduled     Nov 13, 2018  3:30 PM CST   Return Visit with Zain Cox MD   [facility] ([facility])    [address] [place] [address] [phone]              Who to contact     If you have questions or need follow up information about today's clinic visit or your schedule please contact [facility] directly at [phone].  Normal or non-critical lab and imaging results will be communicated to you by [employer], letter or phone within 4 business days after the clinic has received the results. If you do not hear from us within 7 days, please contact the clinic through [employer] or phone. If you have a critical or abnormal lab result, we will notify you by phone as soon as possible.  Submit refill requests through [employer] or call your pharmacy and they will forward the refill request to us. Please allow 3 business days for your refill to be completed.          Additional Information About Your Visit        [employer] Information     [employer] gives you secure access to your electronic health record. If you see a primary care provider, you can also send messages to your care  "team and make appointments. If you have questions, please call your primary care clinic.  If you do not have a primary care provider, please call [phone] and they will assist you.        Care EveryWhere ID     This is your Care EveryWhere ID. This could be used by other organizations to access your [facility] medical records  [id number]        Your Vitals Were     Pulse Temperature Respirations Height Last Period Pulse Oximetry    66 97.9  F (36.6  C) (Oral) 14 5' 1\" (1.549 m) 04/12/2017 99%    BMI (Body Mass Index)                   41.3 kg/m2            Blood Pressure from Last 3 Encounters:   10/26/18 130/76   05/22/18 (!) 154/92   05/10/18 (!) 171/111    Weight from Last 3 Encounters:   10/26/18 218 lb 9.6 oz (99.2 kg)   05/22/18 210 lb (95.3 kg)   05/04/18 212 lb 12.8 oz (96.5 kg)              We Performed the Following     FLU VACCINE, SPLIT VIRUS, IM (QUADRIVALENT) [02723]- >3 YRS     HPV High Risk Types DNA Cervical     Pap imaged thin layer screen with HPV - recommended age 30 - 65 years (select HPV order below)     Vaccine Administration, Initial [85288]          Where to get your medicines      These medications were sent to [facility] [address] [facility] [employer] [facility] [address] [facility]  [address] [facility] [address]     Phone:  [phone]     buPROPion 150 MG 24 hr tablet    losartan 100 MG tablet          Primary Care Provider Office Phone # Fax #    Kristine Marie [phone]       [address]  [facility] [address]        Equal Access to Services     RUDY BUTLER : Haddaiana Cuenca, stephan barrera, sadia hopper. So [facility] [phone].    ATENCIÓN: Si habla español, tiene a new disposición servicios gratuitos de asistencia lingüística. Llame al [phone].    We comply with applicable federal civil rights laws and [place] laws. We do not discriminate on the basis of race, color, national " origin, age, disability, sex, sexual orientation, or gender identity.            Thank you!     Thank you for choosing [facility]  for your care. Our goal is always to provide you with excellent care. Hearing back from our patients is one way we can continue to improve our services. Please take a few minutes to complete the written survey that you may receive in the mail after your visit with us. Thank you!             Your Updated Medication List - Protect others around you: Learn how to safely use, store and throw away your medicines at www.disposemymeds.org.          This list is accurate as of 10/26/18  3:31 PM.  Always use your most recent med list.                   Brand Name Dispense Instructions for use Diagnosis    azelastine 0.05 % ophthalmic solution    OPTIVAR    1 Bottle    Apply 1 drop to eye 2 times daily    Allergic conjunctivitis of both eyes       buPROPion 150 MG 24 hr tablet    WELLBUTRIN XL    90 tablet    TAKE ONE TABLET BY MOUTH ONCE DAILY IN THE MORNING    Adjustment disorder with depressed mood       COQ-10 PO      Take 2 tablets by mouth        losartan 100 MG tablet    COZAAR    90 tablet    Take 1 tablet (100 mg) by mouth daily    Hypertension goal BP (blood pressure) < 140/90       OMEGA-3 FISH OIL PO      Take 2 g by mouth daily        omeprazole 40 MG capsule    priLOSEC    90 capsule    Take 1 capsule (40 mg) by mouth daily Take 30-60 minutes before a meal.    LPRD (laryngopharyngeal reflux disease), Change in voice       SUMAtriptan 50 MG tablet    IMITREX    9 tablet    Take 1 tablet (50 mg) by mouth at onset of headache for migraine May repeat dose in 2 hours.  Do not exceed 200 mg in 24 hours    Classic migraine with aura       triamcinolone 0.1 % cream    KENALOG    30 g    Apply sparingly to affected area two times daily for 10-14 days.    Atopic dermatitis, unspecified type

## 2018-10-26 NOTE — LETTER
October 13, 2019      Rizwana Narayannes  [address] [facility] [address]    Dear Ms.Aguirre,      At [facility], your health and wellness is our primary concern. That is why we are following up on a positive high risk HPV test from 10/26/18. Your provider had recommended that you have a Pap smear and HPV test completed by 10/26/19. Our records do not show that this has been scheduled.    It is important to complete the follow up that your provider has suggested for you to ensure that there are no worsening changes which may, over time, develop into cancer.      Please contact our office at  [phone] to schedule an appointment for a Pap smear and HPV test at your earliest convenience. If you have questions or concerns, please call the clinic and we will be happy to assist you.    If you have completed the tests outside of [facility], please have the results forwarded to our office. We will update the chart for your primary Physician to review before your next annual physical.     Thank you for choosing [facility]!    Sincerely,      Your [facility] Care Team/matthew

## 2018-10-26 NOTE — LETTER
October 22, 2019      Rizwana Narayannes  [address] [facility] [address]    Dear Ms.Aguirre,      At [facility], your health and wellness is our primary concern. That is why we are following up on a positive high risk HPV test from 10/26/18. Your provider had recommended that you have a Pap smear and HPV test completed by 10/26/19. Our records do not show that this has been scheduled.    It is important to complete the follow up that your provider has suggested for you to ensure that there are no worsening changes which may, over time, develop into cancer.      Please contact our office at  [phone] to schedule an appointment for a Pap smear and HPV test at your earliest convenience. If you have questions or concerns, please call the clinic and we will be happy to assist you.    If you have completed the tests outside of [facility], please have the results forwarded to our office. We will update the chart for your primary Physician to review before your next annual physical.     Thank you for choosing [facility]!    Sincerely,      Your [facility] Care Team/matthew

## 2018-10-27 ASSESSMENT — ANXIETY QUESTIONNAIRES: GAD7 TOTAL SCORE: 2

## 2018-10-30 LAB
COPATH REPORT: NORMAL
PAP: NORMAL

## 2018-11-01 LAB
FINAL DIAGNOSIS: ABNORMAL
HPV HR 12 DNA CVX QL NAA+PROBE: POSITIVE
HPV16 DNA SPEC QL NAA+PROBE: NEGATIVE
HPV18 DNA SPEC QL NAA+PROBE: NEGATIVE
SPECIMEN DESCRIPTION: ABNORMAL
SPECIMEN SOURCE CVX/VAG CYTO: ABNORMAL

## 2018-11-01 NOTE — PROGRESS NOTES
2012 NIL pap, Neg HPV.  2015 NIL pap.  10/26/18 NIL pap, + HR HPV (not 16 or 18). Plan cotest in one year.   11/1/18 Pt notified via [employer]. (MRA)  11/1/18 Pt read result in [employer] at 435pm. (MRA)  10/13/19 My Chart cotest reminder message sent (rlm)  10/22/19 My Chart not read, reminder letter sent (rlm)  11/15/19 NIL pap with + HR HPV (not 16 or 18). Plan colp. (MRA)  11/25/19 left msg and sent My chart result note. Patient read my chart same day  12/23/19 [place] without bx. Plan: Cotest in 1 yr.

## 2018-11-13 ENCOUNTER — OFFICE VISIT (OUTPATIENT)
Dept: OTOLARYNGOLOGY | Facility: CLINIC | Age: 53
End: 2018-11-13
Payer: COMMERCIAL

## 2018-11-13 VITALS
RESPIRATION RATE: 12 BRPM | SYSTOLIC BLOOD PRESSURE: 141 MMHG | DIASTOLIC BLOOD PRESSURE: 91 MMHG | BODY MASS INDEX: 41.16 KG/M2 | OXYGEN SATURATION: 96 % | HEIGHT: 61 IN | WEIGHT: 218 LBS | HEART RATE: 64 BPM

## 2018-11-13 DIAGNOSIS — R49.9 CHANGE IN VOICE: ICD-10-CM

## 2018-11-13 DIAGNOSIS — K21.9 LPRD (LARYNGOPHARYNGEAL REFLUX DISEASE): ICD-10-CM

## 2018-11-13 DIAGNOSIS — R49.0 HOARSENESS: Primary | ICD-10-CM

## 2018-11-13 PROCEDURE — 99214 OFFICE O/P EST MOD 30 MIN: CPT | Performed by: OTOLARYNGOLOGY

## 2018-11-13 RX ORDER — OMEPRAZOLE 40 MG/1
40 CAPSULE, DELAYED RELEASE ORAL DAILY
Qty: 90 CAPSULE | Refills: 3 | Status: SHIPPED | OUTPATIENT
Start: 2018-11-13 | End: 2019-11-15

## 2018-11-13 NOTE — PATIENT INSTRUCTIONS
Scheduling Information  To schedule your CT/MRI scan, please contact [facility] at [phone] OR [facility] at [phone]    To schedule your Surgery, please contact our Specialty Schedulers at [phone]      ENT Clinic Locations Clinic Hours Telephone Number     [facility]  [address] [place] [address]   Monday:           1:00pm -- 5:00pm    Friday:              8:00am - 12:00pm   To schedule/reschedule an appointment with   Dr. Cox,   please contact our   Specialty Scheduling Department at:     [phone]       [facility]  [address] [place] [address] Tuesday:          8:00am -- 2:00pm         Urgent Care Locations Clinic Hours Telephone Numbers     [facility]  [address] [place] [address]     Monday-Friday:     11:00am - 9:00pm    Saturday-Sunday:  9:00am - 5:00pm   [phone]     [facility]  [address] [place] [address]     Monday-Friday:      5:00pm - 9:00pm     Saturday-Sunday:  9:00am - 5:00pm   [phone]

## 2018-11-13 NOTE — MR AVS SNAPSHOT
After Visit Summary   11/13/2018    Rizwana Aguirre    MRN: [id number]           Patient Information     Date Of Birth          1965        Visit Information        Provider Department      11/13/2018 3:30 PM Zain Cox MD [facility]        Today's Diagnoses     Hoarseness    -  1    LPRD (laryngopharyngeal reflux disease)        Change in voice          Care Instructions    Scheduling Information  To schedule your CT/MRI scan, please contact [facility] at [phone] OR [facility] at [phone]    To schedule your Surgery, please contact our Specialty Schedulers at [phone]      ENT Clinic Locations Clinic Hours Telephone Number     [facility]  [address]  [place] [address]   Monday:           1:00pm -- 5:00pm    Friday:              8:00am - 12:00pm   To schedule/reschedule an appointment with   Dr. oCx,   please contact our   Specialty Scheduling Department at:     [phone]       [facility]  [address] [place] [address] Tuesday:          8:00am -- 2:00pm         Urgent Care Locations Clinic Hours Telephone Numbers     [facility]  [address] [place] [address]     Monday-Friday:     11:00am - 9:00pm    Saturday-Sunday:  9:00am - 5:00pm   [phone]     [facility]  [address] [place] [address]     Monday-Friday:      5:00pm - 9:00pm     Saturday-Sunday:  9:00am - 5:00pm   [phone]                 Follow-ups after your visit        Who to contact     If you have questions or need follow up information about today's clinic visit or your schedule please contact [facility] directly at [phone].  Normal or non-critical lab and imaging results will be communicated to you by MyChart, letter or phone within 4 business days after the clinic has received the results. If you do not hear from us within 7 days, please contact the clinic  "through [employer] or phone. If you have a critical or abnormal lab result, we will notify you by phone as soon as possible.  Submit refill requests through [employer] or call your pharmacy and they will forward the refill request to us. Please allow 3 business days for your refill to be completed.          Additional Information About Your Visit        [employer] Information     [employer] gives you secure access to your electronic health record. If you see a primary care provider, you can also send messages to your care team and make appointments. If you have questions, please call your primary care clinic.  If you do not have a primary care provider, please call [phone] and they will assist you.        Care EveryWhere ID     This is your Care EveryWhere ID. This could be used by other organizations to access your [place] medical records  [id number]        Your Vitals Were     Pulse Respirations Height Last Period Pulse Oximetry BMI (Body Mass Index)    64 12 1.549 m (5' 1\") 04/12/2017 96% 41.19 kg/m2       Blood Pressure from Last 3 Encounters:   11/13/18 (!) 141/91   10/26/18 130/76   05/22/18 (!) 154/92    Weight from Last 3 Encounters:   11/13/18 98.9 kg (218 lb)   10/26/18 99.2 kg (218 lb 9.6 oz)   05/22/18 95.3 kg (210 lb)              Today, you had the following     No orders found for display         Where to get your medicines      These medications were sent to [facility] [address] [address] [facility] [address] [address]  [address] [address] [address]     Phone:  [phone]     omeprazole 40 MG capsule          Primary Care Provider Office Phone # Fax #    Kristine Marie [phone]       [address]  [facility] [address]        Equal Access to Services     [facility] AH: Hadii german bacon hadmarthao Sonereyda, waaxda luqadaha, qaybta kaalmada prashant, sadia donis. So [facility] [phone].    ATENCIÓN: Si habla español, tiene a new disposición " servicios gratuitos de asistencia lingüística. Alexandru zapata [phone].    We comply with applicable federal civil rights laws and [place] laws. We do not discriminate on the basis of race, color, national origin, age, disability, sex, sexual orientation, or gender identity.            Thank you!     Thank you for choosing [facility]  for your care. Our goal is always to provide you with excellent care. Hearing back from our patients is one way we can continue to improve our services. Please take a few minutes to complete the written survey that you may receive in the mail after your visit with us. Thank you!             Your Updated Medication List - Protect others around you: Learn how to safely use, store and throw away your medicines at www.disposemymeds.org.          This list is accurate as of 11/13/18  4:02 PM.  Always use your most recent med list.                   Brand Name Dispense Instructions for use Diagnosis    azelastine 0.05 % ophthalmic solution    OPTIVAR    1 Bottle    Apply 1 drop to eye 2 times daily    Allergic conjunctivitis of both eyes       buPROPion 150 MG 24 hr tablet    WELLBUTRIN XL    90 tablet    TAKE ONE TABLET BY MOUTH ONCE DAILY IN THE MORNING    Adjustment disorder with depressed mood       COQ-10 PO      Take 2 tablets by mouth        losartan 100 MG tablet    COZAAR    90 tablet    Take 1 tablet (100 mg) by mouth daily    Hypertension goal BP (blood pressure) < 140/90       OMEGA-3 FISH OIL PO      Take 2 g by mouth daily        omeprazole 40 MG capsule    priLOSEC    90 capsule    Take 1 capsule (40 mg) by mouth daily Take 30-60 minutes before a meal.    LPRD (laryngopharyngeal reflux disease), Change in voice       SUMAtriptan 50 MG tablet    IMITREX    9 tablet    Take 1 tablet (50 mg) by mouth at onset of headache for migraine May repeat dose in 2 hours.  Do not exceed 200 mg in 24 hours    Classic migraine with aura       triamcinolone 0.1 % cream     KENALOG    30 g    Apply sparingly to affected area two times daily for 10-14 days.    Atopic dermatitis, unspecified type

## 2018-11-13 NOTE — LETTER
11/13/2018         RE: Rizwana Aguirre  [address]  [facility] [address]        Dear Colleague,    Thank you for referring your patient, Rizwana Aguirre, to the [facility]. Please see a copy of my visit note below.    History of Present Illness - Rizwana Aguirre is a 52 year old female here to see me in follow up from 5/22/2018, sen initially for throat issues at the consultation of Dr. Marie.    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  She usually sings second soprano, or alto.  About one and a half years ago, she had a bad bronchitis and lost her voice.  Since that point her voice does not seem to have been the same.  The illness lasted almost a month and the symptoms went away, but the voice has not been the same.  Interestingly, the main issue is the middle range.  That issue has stayed the same over that time.    No dysphagia at all.  No coughing up blood, no change in speaking voice.  However, well before all this, she did have a few episodes in her adult life where food got stuck in her throat.  Once so severely that she had to to the ER and that was cleared with an effervescent.    After evaluation, including flexible scope, I felt that this was laryngopharyngeal reflux and started her on reflux medication.  She was lost to follow up until now.    She tells me that it is about 65% better.  She no longer has any choking.  Her vocal range is stronger again, and does not croak or fall off notes anymore. But it is still not totally normal.    Past Medical History -   Patient Active Problem List   Diagnosis     HTN (hypertension), benign     CARDIOVASCULAR SCREENING; LDL GOAL LESS THAN 160     Hypertension goal BP (blood pressure) < 140/90     Vitamin D deficiency     Pruritus     Hyperphoria     Lesion of external ear     Classic migraine with aura     Urinary incontinence, unspecified incontinence type      Functional urinary incontinence     Atopic dermatitis, unspecified type     Atypical ductal hyperplasia of left breast     Mild aortic insufficiency     Non-rheumatic mitral regurgitation     Ductal carcinoma in situ (DCIS) of left breast     Morbid obesity (H)     Adjustment disorder with depressed mood     Nuclear sclerosis of both eyes     Cervical high risk HPV (human papillomavirus) test positive       Current Medications -   Current Outpatient Prescriptions:      azelastine (OPTIVAR) 0.05 % SOLN ophthalmic solution, Apply 1 drop to eye 2 times daily, Disp: 1 Bottle, Rfl: 11     buPROPion (WELLBUTRIN XL) 150 MG 24 hr tablet, TAKE ONE TABLET BY MOUTH ONCE DAILY IN THE MORNING, Disp: 90 tablet, Rfl: 3     Coenzyme Q10 (COQ-10 PO), Take 2 tablets by mouth , Disp: , Rfl:      losartan (COZAAR) 100 MG tablet, Take 1 tablet (100 mg) by mouth daily, Disp: 90 tablet, Rfl: 3     Omega-3 Fatty Acids (OMEGA-3 FISH OIL PO), Take 2 g by mouth daily , Disp: , Rfl:      omeprazole (PRILOSEC) 40 MG capsule, Take 1 capsule (40 mg) by mouth daily Take 30-60 minutes before a meal., Disp: 90 capsule, Rfl: 1     SUMAtriptan (IMITREX) 50 MG tablet, Take 1 tablet (50 mg) by mouth at onset of headache for migraine May repeat dose in 2 hours.  Do not exceed 200 mg in 24 hours, Disp: 9 tablet, Rfl: 1     triamcinolone (KENALOG) 0.1 % cream, Apply sparingly to affected area two times daily for 10-14 days., Disp: 30 g, Rfl: 1    Allergies -   Allergies   Allergen Reactions     Cantaloupe [Melon]      Scratchy throat     Shrimp Nausea     Walnuts [Nuts]      Scratchy throat       Social History -   Social History     Social History     Marital status:      Spouse name: N/A     Number of children: N/A     Years of education: N/A     Occupational History      [employer]     Social History Main Topics     Smoking status: Never Smoker     Smokeless tobacco: Never Used     Alcohol use 0.0 oz/week      Comment: Social     Drug use: No      Sexual activity: Yes     Partners: Male     Birth control/ protection: Condom     Other Topics Concern     Parent/Sibling W/ Cabg, Mi Or Angioplasty Before 65f 55m? No     Social History Narrative       Family History -   Family History   Problem Relation Age of Onset     C.A.D. Mother      Hypertension Mother      C.A.D. Father      Cancer Father 40     Melanoma     Hypertension Father      Other Cancer Father      melanoma     Cerebrovascular Disease Maternal Grandmother        Review of Systems - As per HPI and PMHx, otherwise 10+ system review of the head and neck, and general constitution is negative.    Physical Exam  LMP 04/12/2017    General - The patient is well nourished and well developed, and appears to have good nutritional status.  Alert and oriented to person and place, answers questions and cooperates with examination appropriately.   Head and Face - Normocephalic and atraumatic, with no gross asymmetry noted of the contour of the facial features.  The facial nerve is intact, with strong symmetric movements.  Voice and Breathing - The patient was breathing comfortably without the use of accessory muscles. There was no wheezing, stridor, or stertor.  The patients voice was clear and strong, and had appropriate pitch and quality.  Ears - The tympanic membranes are normal in appearance, bony landmarks are intact.  No retraction, perforation, or masses.  No fluid or purulence was seen in the external canal or the middle ear. No evidence of infection of the middle ear or external canal, cerumen was normal in appearance.  Eyes - Extraocular movements intact, and the pupils were reactive to light.  Sclera were not icteric or injected, conjunctiva were pink and moist.  Mouth - Examination of the oral cavity showed pink, healthy oral mucosa. No lesions or ulcerations noted.  The tongue was mobile and midline, and the dentition were in good condition.    Throat - The walls of the oropharynx were smooth, pink,  moist, symmetric, and had no lesions or ulcerations.  The tonsillar pillars and soft palate were symmetric.  The uvula was midline on elevation.    Neck - Normal midline excursion of the laryngotracheal complex during swallowing.  Full range of motion on passive movement.  Palpation of the occipital, submental, submandibular, internal jugular chain, and supraclavicular nodes did not demonstrate any abnormal lymph nodes or masses.  The carotid pulse was palpable bilaterally.  Palpation of the thyroid was soft and smooth, with no nodules or goiter appreciated.  The trachea was mobile and midline.  Nose - External contour is symmetric, no gross deflection or scars.  Nasal mucosa is pink and moist with no abnormal mucus.  The septum was midline and non-obstructive, turbinates of normal size and position.  No polyps, masses, or purulence noted on examination.      A/P - Rizwana Aguirre (Katie) is a 52 year old female  (R49.0) Hoarseness  (primary encounter diagnosis)  (K21.9) LPRD (laryngopharyngeal reflux disease)    The remainder of today's visit was spent reviewing non-medical measures that can help in the management of acid reflux.  Specifically, avoidance of eating before bed, elevating the head of the bed, avoiding large meals, minimizing fatty foods, minimal wine/beer/soda, and eating smaller meals spread out through the day.    Reflux medication will be continued with just omeprazole 40mg.  I discussed adding a secondary reflux medicaiton, but she would prefer trying to be more fastidious about dietary mods.      I will see her in follow up in 6 months.    Again, thank you for allowing me to participate in the care of your patient.        Sincerely,        Zain Cox MD

## 2018-11-13 NOTE — PROGRESS NOTES
History of Present Illness - Rizwana Aguirre is a 52 year old female here to see me in follow up from 5/22/2018, sen initially for throat issues at the consultation of Dr. Marie.    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  She usually sings second soprano, or alto.  About one and a half years ago, she had a bad bronchitis and lost her voice.  Since that point her voice does not seem to have been the same.  The illness lasted almost a month and the symptoms went away, but the voice has not been the same.  Interestingly, the main issue is the middle range.  That issue has stayed the same over that time.    No dysphagia at all.  No coughing up blood, no change in speaking voice.  However, well before all this, she did have a few episodes in her adult life where food got stuck in her throat.  Once so severely that she had to to the ER and that was cleared with an effervescent.    After evaluation, including flexible scope, I felt that this was laryngopharyngeal reflux and started her on reflux medication.  She was lost to follow up until now.    She tells me that it is about 65% better.  She no longer has any choking.  Her vocal range is stronger again, and does not croak or fall off notes anymore. But it is still not totally normal.    Past Medical History -   Patient Active Problem List   Diagnosis     HTN (hypertension), benign     CARDIOVASCULAR SCREENING; LDL GOAL LESS THAN 160     Hypertension goal BP (blood pressure) < 140/90     Vitamin D deficiency     Pruritus     Hyperphoria     Lesion of external ear     Classic migraine with aura     Urinary incontinence, unspecified incontinence type     Functional urinary incontinence     Atopic dermatitis, unspecified type     Atypical ductal hyperplasia of left breast     Mild aortic insufficiency     Non-rheumatic mitral regurgitation     Ductal carcinoma in situ (DCIS) of left breast     Morbid  obesity (H)     Adjustment disorder with depressed mood     Nuclear sclerosis of both eyes     Cervical high risk HPV (human papillomavirus) test positive       Current Medications -   Current Outpatient Prescriptions:      azelastine (OPTIVAR) 0.05 % SOLN ophthalmic solution, Apply 1 drop to eye 2 times daily, Disp: 1 Bottle, Rfl: 11     buPROPion (WELLBUTRIN XL) 150 MG 24 hr tablet, TAKE ONE TABLET BY MOUTH ONCE DAILY IN THE MORNING, Disp: 90 tablet, Rfl: 3     Coenzyme Q10 (COQ-10 PO), Take 2 tablets by mouth , Disp: , Rfl:      losartan (COZAAR) 100 MG tablet, Take 1 tablet (100 mg) by mouth daily, Disp: 90 tablet, Rfl: 3     Omega-3 Fatty Acids (OMEGA-3 FISH OIL PO), Take 2 g by mouth daily , Disp: , Rfl:      omeprazole (PRILOSEC) 40 MG capsule, Take 1 capsule (40 mg) by mouth daily Take 30-60 minutes before a meal., Disp: 90 capsule, Rfl: 1     SUMAtriptan (IMITREX) 50 MG tablet, Take 1 tablet (50 mg) by mouth at onset of headache for migraine May repeat dose in 2 hours.  Do not exceed 200 mg in 24 hours, Disp: 9 tablet, Rfl: 1     triamcinolone (KENALOG) 0.1 % cream, Apply sparingly to affected area two times daily for 10-14 days., Disp: 30 g, Rfl: 1    Allergies -   Allergies   Allergen Reactions     Cantaloupe [Melon]      Scratchy throat     Shrimp Nausea     Walnuts [Nuts]      Scratchy throat       Social History -   Social History     Social History     Marital status:      Spouse name: N/A     Number of children: N/A     Years of education: N/A     Occupational History      [employer]     Social History Main Topics     Smoking status: Never Smoker     Smokeless tobacco: Never Used     Alcohol use 0.0 oz/week      Comment: Social     Drug use: No     Sexual activity: Yes     Partners: Male     Birth control/ protection: Condom     Other Topics Concern     Parent/Sibling W/ Cabg, Mi Or Angioplasty Before 65f 55m? No     Social History Narrative       Family History -   Family History   Problem  Relation Age of Onset     C.A.D. Mother      Hypertension Mother      C.A.D. Father      Cancer Father 40     Melanoma     Hypertension Father      Other Cancer Father      melanoma     Cerebrovascular Disease Maternal Grandmother        Review of Systems - As per HPI and PMHx, otherwise 10+ system review of the head and neck, and general constitution is negative.    Physical Exam  LMP 04/12/2017    General - The patient is well nourished and well developed, and appears to have good nutritional status.  Alert and oriented to person and place, answers questions and cooperates with examination appropriately.   Head and Face - Normocephalic and atraumatic, with no gross asymmetry noted of the contour of the facial features.  The facial nerve is intact, with strong symmetric movements.  Voice and Breathing - The patient was breathing comfortably without the use of accessory muscles. There was no wheezing, stridor, or stertor.  The patients voice was clear and strong, and had appropriate pitch and quality.  Ears - The tympanic membranes are normal in appearance, bony landmarks are intact.  No retraction, perforation, or masses.  No fluid or purulence was seen in the external canal or the middle ear. No evidence of infection of the middle ear or external canal, cerumen was normal in appearance.  Eyes - Extraocular movements intact, and the pupils were reactive to light.  Sclera were not icteric or injected, conjunctiva were pink and moist.  Mouth - Examination of the oral cavity showed pink, healthy oral mucosa. No lesions or ulcerations noted.  The tongue was mobile and midline, and the dentition were in good condition.    Throat - The walls of the oropharynx were smooth, pink, moist, symmetric, and had no lesions or ulcerations.  The tonsillar pillars and soft palate were symmetric.  The uvula was midline on elevation.    Neck - Normal midline excursion of the laryngotracheal complex during swallowing.  Full range of  motion on passive movement.  Palpation of the occipital, submental, submandibular, internal jugular chain, and supraclavicular nodes did not demonstrate any abnormal lymph nodes or masses.  The carotid pulse was palpable bilaterally.  Palpation of the thyroid was soft and smooth, with no nodules or goiter appreciated.  The trachea was mobile and midline.  Nose - External contour is symmetric, no gross deflection or scars.  Nasal mucosa is pink and moist with no abnormal mucus.  The septum was midline and non-obstructive, turbinates of normal size and position.  No polyps, masses, or purulence noted on examination.      A/P - Rizwana Aguirre (Katie) is a 52 year old female  (R49.0) Hoarseness  (primary encounter diagnosis)  (K21.9) LPRD (laryngopharyngeal reflux disease)    The remainder of today's visit was spent reviewing non-medical measures that can help in the management of acid reflux.  Specifically, avoidance of eating before bed, elevating the head of the bed, avoiding large meals, minimizing fatty foods, minimal wine/beer/soda, and eating smaller meals spread out through the day.    Reflux medication will be continued with just omeprazole 40mg.  I discussed adding a secondary reflux medicaiton, but she would prefer trying to be more fastidious about dietary mods.      I will see her in follow up in 6 months.

## 2019-02-19 ENCOUNTER — ANCILLARY PROCEDURE (OUTPATIENT)
Dept: GENERAL RADIOLOGY | Facility: CLINIC | Age: 54
End: 2019-02-19
Attending: PREVENTIVE MEDICINE
Payer: COMMERCIAL

## 2019-02-19 ENCOUNTER — OFFICE VISIT (OUTPATIENT)
Dept: FAMILY MEDICINE | Facility: CLINIC | Age: 54
End: 2019-02-19
Payer: COMMERCIAL

## 2019-02-19 VITALS
DIASTOLIC BLOOD PRESSURE: 90 MMHG | OXYGEN SATURATION: 96 % | HEIGHT: 61 IN | SYSTOLIC BLOOD PRESSURE: 148 MMHG | HEART RATE: 67 BPM | TEMPERATURE: 98 F | BODY MASS INDEX: 41.54 KG/M2 | WEIGHT: 220 LBS

## 2019-02-19 DIAGNOSIS — S83.92XA SPRAIN OF LEFT KNEE, UNSPECIFIED LIGAMENT, INITIAL ENCOUNTER: Primary | ICD-10-CM

## 2019-02-19 DIAGNOSIS — E66.01 MORBID OBESITY (H): ICD-10-CM

## 2019-02-19 DIAGNOSIS — I10 HYPERTENSION GOAL BP (BLOOD PRESSURE) < 140/90: ICD-10-CM

## 2019-02-19 DIAGNOSIS — S83.92XA SPRAIN OF LEFT KNEE, UNSPECIFIED LIGAMENT, INITIAL ENCOUNTER: ICD-10-CM

## 2019-02-19 LAB
ANION GAP SERPL CALCULATED.3IONS-SCNC: 4 MMOL/L (ref 3–14)
BUN SERPL-MCNC: 21 MG/DL (ref 7–30)
CALCIUM SERPL-MCNC: 9.4 MG/DL (ref 8.5–10.1)
CHLORIDE SERPL-SCNC: 106 MMOL/L (ref 94–109)
CO2 SERPL-SCNC: 29 MMOL/L (ref 20–32)
CREAT SERPL-MCNC: 0.64 MG/DL (ref 0.52–1.04)
CREAT UR-MCNC: 61 MG/DL
GFR SERPL CREATININE-BSD FRML MDRD: >90 ML/MIN/{1.73_M2}
GLUCOSE SERPL-MCNC: 109 MG/DL (ref 70–99)
LDLC SERPL DIRECT ASSAY-MCNC: 147 MG/DL
MICROALBUMIN UR-MCNC: <5 MG/L
MICROALBUMIN/CREAT UR: NORMAL MG/G CR (ref 0–25)
POTASSIUM SERPL-SCNC: 3.9 MMOL/L (ref 3.4–5.3)
SODIUM SERPL-SCNC: 139 MMOL/L (ref 133–144)

## 2019-02-19 PROCEDURE — 73560 X-RAY EXAM OF KNEE 1 OR 2: CPT | Mod: LT

## 2019-02-19 PROCEDURE — 83721 ASSAY OF BLOOD LIPOPROTEIN: CPT | Performed by: PREVENTIVE MEDICINE

## 2019-02-19 PROCEDURE — 36415 COLL VENOUS BLD VENIPUNCTURE: CPT | Performed by: PREVENTIVE MEDICINE

## 2019-02-19 PROCEDURE — 80048 BASIC METABOLIC PNL TOTAL CA: CPT | Performed by: PREVENTIVE MEDICINE

## 2019-02-19 PROCEDURE — 82043 UR ALBUMIN QUANTITATIVE: CPT | Performed by: PREVENTIVE MEDICINE

## 2019-02-19 PROCEDURE — 99214 OFFICE O/P EST MOD 30 MIN: CPT | Performed by: PREVENTIVE MEDICINE

## 2019-02-19 RX ORDER — LOSARTAN POTASSIUM AND HYDROCHLOROTHIAZIDE 12.5; 1 MG/1; MG/1
1 TABLET ORAL DAILY
Qty: 90 TABLET | Refills: 3 | Status: SHIPPED | OUTPATIENT
Start: 2019-02-19 | End: 2020-04-07

## 2019-02-19 ASSESSMENT — PAIN SCALES - GENERAL: PAINLEVEL: SEVERE PAIN (6)

## 2019-02-19 ASSESSMENT — MIFFLIN-ST. JEOR: SCORE: 1549.25

## 2019-02-19 NOTE — PROGRESS NOTES
SUBJECTIVE:   Rizwana Aguirre is a 53 year old female who presents to clinic today for the following health issues:      Musculoskeletal problem/pain      Duration: x 3 weeks    Description  Location: left knee    Intensity:  3-6/10    Accompanying signs and symptoms: none    History  Previous similar problem: no   Previous evaluation:  none    Precipitating or alleviating factors:  Trauma or overuse: no   Aggravating factors include: walking, stairs and pivoting     Therapies tried and outcome: Ibuprofen, helps a little    Started one day after doing some exercises    Symptoms stable    No numbness    No weakness    Some instability and has been giving out       Hypertension Follow-up      Outpatient blood pressures are being checked at home.  Results are 118-148/73-94. Mostly in the 120-130 range.    Low Salt Diet: low salt      Problem list and histories reviewed & adjusted, as indicated.  Additional history: as documented    Patient Active Problem List   Diagnosis     HTN (hypertension), benign     CARDIOVASCULAR SCREENING; LDL GOAL LESS THAN 160     Hypertension goal BP (blood pressure) < 140/90     Vitamin D deficiency     Pruritus     Hyperphoria     Lesion of external ear     Classic migraine with aura     Urinary incontinence, unspecified incontinence type     Functional urinary incontinence     Atopic dermatitis, unspecified type     Atypical ductal hyperplasia of left breast     Mild aortic insufficiency     Non-rheumatic mitral regurgitation     Ductal carcinoma in situ (DCIS) of left breast     Morbid obesity (H)     Adjustment disorder with depressed mood     Nuclear sclerosis of both eyes     Cervical high risk HPV (human papillomavirus) test positive     Past Surgical History:   Procedure Laterality Date     BIOPSY BREAST NEEDLE LOCALIZATION Left 11/14/2016    Procedure: BIOPSY BREAST NEEDLE LOCALIZATION;  Surgeon: Livia Frederick MD;  Location: [facility] OR     BREAST BIOPSY, CORE RT/LT Left 10/12/2016      C  DELIVERY ONLY  x2     COLONOSCOPY WITH CO2 INSUFFLATION N/A 2017    Procedure: COLONOSCOPY WITH CO2 INSUFFLATION;  BMI 36.11  Colon cancer screening  Referring Provider-Indira Aldrich  Pharmacy [facility]  Fax [phone]  ;  Surgeon: Duane, William Charles, MD;  Location:  OR       Social History     Tobacco Use     Smoking status: Never Smoker     Smokeless tobacco: Never Used   Substance Use Topics     Alcohol use: Yes     Alcohol/week: 0.0 oz     Comment: Social     Family History   Problem Relation Age of Onset     C.A.D. Mother      Hypertension Mother      C.A.D. Father      Cancer Father 40        Melanoma     Hypertension Father      Other Cancer Father         melanoma     Cerebrovascular Disease Maternal Grandmother          Current Outpatient Medications   Medication Sig Dispense Refill     azelastine (OPTIVAR) 0.05 % SOLN ophthalmic solution Apply 1 drop to eye 2 times daily 1 Bottle 11     buPROPion (WELLBUTRIN XL) 150 MG 24 hr tablet TAKE ONE TABLET BY MOUTH ONCE DAILY IN THE MORNING 90 tablet 3     Coenzyme Q10 (COQ-10 PO) Take 2 tablets by mouth        losartan-hydrochlorothiazide (HYZAAR) 100-12.5 MG tablet Take 1 tablet by mouth daily 90 tablet 3     Omega-3 Fatty Acids (OMEGA-3 FISH OIL PO) Take 2 g by mouth daily        omeprazole (PRILOSEC) 40 MG capsule Take 1 capsule (40 mg) by mouth daily Take 30-60 minutes before a meal. 90 capsule 3     SUMAtriptan (IMITREX) 50 MG tablet Take 1 tablet (50 mg) by mouth at onset of headache for migraine May repeat dose in 2 hours.  Do not exceed 200 mg in 24 hours 9 tablet 1     triamcinolone (KENALOG) 0.1 % cream Apply sparingly to affected area two times daily for 10-14 days. 30 g 1     Allergies   Allergen Reactions     Cantaloupe [Melon]      Scratchy throat     Shrimp Nausea     Walnuts [Nuts]      Scratchy throat     BP Readings from Last 3 Encounters:   19 148/90   18 (!) 141/91   10/26/18 130/76    Wt  "Readings from Last 3 Encounters:   02/19/19 99.8 kg (220 lb)   11/13/18 98.9 kg (218 lb)   10/26/18 99.2 kg (218 lb 9.6 oz)                  Labs reviewed in EPIC    Reviewed and updated as needed this visit by clinical staff  Tobacco  Allergies  Meds  Med Hx  Surg Hx  Fam Hx       Reviewed and updated as needed this visit by Provider  Allergies  Med Hx  Surg Hx  Fam Hx         ROS:  Constitutional, HEENT, cardiovascular, pulmonary, gi and gu systems are negative, except as otherwise noted.    OBJECTIVE:                                                    /90   Pulse 67   Temp 98  F (36.7  C) (Oral)   Ht 1.556 m (5' 1.25\")   Wt 99.8 kg (220 lb)   LMP 04/12/2017   SpO2 96%   Breastfeeding? No   BMI 41.23 kg/m    Body mass index is 41.23 kg/m .  GENERAL APPEARANCE: healthy, alert and no distress  EYES: Eyes grossly normal to inspection and conjunctivae and sclerae normal  RESP: lungs clear to auscultation - no rales, rhonchi or wheezes  CV: regular rates and rhythm, normal S1 S2, no S3 or S4 and no murmur, click or rub  MS: extremities normal- no gross deformities noted  SKIN: no suspicious lesions or rashes  NEURO: Normal strength and tone, mentation intact and speech normal  PSYCH: mentation appears normal and affect normal/bright  Left knee:  Inspection does not show any gross deformities.  No edema, no erythema, no skin changes, no rash.  No tibial tuberosity tenderness, no tenderness along medial, and lateral joint lines.  No pes anserine bursitis.  No popliteal tenderness.  Full range of motion.  No quadriceps atrophy.  Strength is 5/5.  Neurovascular intact.  Anterior and posterior drawer signs are negative.  Varus test positive+ Zeynep test negative.            Diagnostic test results:  Diagnostic Test Results:  No results found for this or any previous visit (from the past 24 hour(s)).     ASSESSMENT/PLAN:                                                    1. Sprain of left knee, " unspecified ligament, initial encounter  -Await results of knee X rays  -Rest, Ice, NSAIDs (Ibuprofen 600 mg every 8 hours with food)  -Will start Physical Therapy at work.    2. Morbid obesity (H)  -No major weight change  -Is working on losing weight, has made dietary changes and has been exercising     3. Hypertension goal BP (blood pressure) < 140/90  -Better readings at home  -Changed Losartan to Hyzaar 100-12.5 mg daily   - BASIC METABOLIC PANEL  - Albumin Random Urine Quantitative with Creat Ratio  - losartan-hydrochlorothiazide (HYZAAR) 100-12.5 MG tablet; Take 1 tablet by mouth daily  Dispense: 90 tablet; Refill: 3  - LDL cholesterol direct      Follow up with Provider - 4 weeks for Blood pressure check on Ancillary, if not at goal then increase Hydrochlorothiazide from 12.5 mg to 25 mg daily   If knee is not better in 4 weeks then refer to Orthopedics       Kristine Marie MD MPH    [facility]

## 2019-02-19 NOTE — PATIENT INSTRUCTIONS
At [facility], we strive to deliver an exceptional experience to you, every time we see you.  If you receive a survey in the mail, please send us back your thoughts. We really do value your feedback.    Your care team:                            Family Medicine Internal Medicine   MD Reagan Mathur MD Shantel Branch-Fleming, MD Katya Georgiev PA-C Megan Hill, APRN CELENA Serrano MD Pediatrics   Dharmesh Guevara, ASHWINI Martines, MD Samira Kirkpatrick APRN CNP   MD Michaela Benjamin MD Deborah Mielke, MD Christiana Araujo, APRN [facility]      Clinic hours: Monday - Thursday 7 am-7 pm; Fridays 7 am-5 pm.   Urgent care: Monday - Friday 11 am-9 pm; Saturday and Sunday 9 am-5 pm.  Pharmacy : Monday -Thursday 8 am-8 pm; Friday 8 am-6 pm; Saturday and Sunday 9 am-5 pm.     Clinic: [phone]   Pharmacy: [phone]

## 2019-02-19 NOTE — RESULT ENCOUNTER NOTE
Jerri,    X rays of the knee did not show any acute bony abnormalities, no fractures or fluid in the joint seen. No arthritis seen. Plan of care and follow up as discussed in clinic.     Please do not hesitate to call us at [phone] if you have any questions or concerns.    Thank you,    Kristine Marie MD MPH

## 2019-02-20 NOTE — RESULT ENCOUNTER NOTE
Jerri,     Dear Rizwana Aguirre    Here are your cholesterol results, LDL levels have increased from last check:    Your LDL is: Lab Results       Component                Value               Date                       LDL                      147                 02/19/2019                 LDL                      126                 06/22/2016          Your LDL goal is to be less than 130    Here are some ways to improve your cholesterol without medication:    Try to get at least 45 minutes of aerobic exercise 5-6 days a week  Maintain a healthy body weight  Eat less saturated fats  Buy lean cuts of meat, reduce your portions of red meat or substitute poultry or fish  Avoid fried or fast foods that are high in fat  Eat more fruits and vegetables    Urine sample did not show any abnormal protein.  Electrolytes, non fasting glucose and kidney function are normal.    Please do not hesitate to call us at [phone] if you have any questions or concerns.    Thank you,    Kristine Marie MD MPH

## 2019-04-26 ENCOUNTER — ALLIED HEALTH/NURSE VISIT (OUTPATIENT)
Dept: NURSING | Facility: CLINIC | Age: 54
End: 2019-04-26
Payer: COMMERCIAL

## 2019-04-26 VITALS — SYSTOLIC BLOOD PRESSURE: 124 MMHG | DIASTOLIC BLOOD PRESSURE: 82 MMHG

## 2019-04-26 DIAGNOSIS — I10 HTN (HYPERTENSION): Primary | ICD-10-CM

## 2019-08-08 ENCOUNTER — MYC MEDICAL ADVICE (OUTPATIENT)
Dept: FAMILY MEDICINE | Facility: CLINIC | Age: 54
End: 2019-08-08

## 2019-08-09 ENCOUNTER — MYC MEDICAL ADVICE (OUTPATIENT)
Dept: FAMILY MEDICINE | Facility: CLINIC | Age: 54
End: 2019-08-09

## 2019-08-22 DIAGNOSIS — H10.13 ALLERGIC CONJUNCTIVITIS OF BOTH EYES: ICD-10-CM

## 2019-08-27 RX ORDER — AZELASTINE HYDROCHLORIDE 0.5 MG/ML
1 SOLUTION/ DROPS OPHTHALMIC 2 TIMES DAILY
Qty: 1 BOTTLE | Refills: 11 | OUTPATIENT
Start: 2019-08-27

## 2019-10-05 ENCOUNTER — MYC REFILL (OUTPATIENT)
Dept: OTHER | Age: 54
End: 2019-10-05

## 2019-10-05 DIAGNOSIS — H10.13 ALLERGIC CONJUNCTIVITIS OF BOTH EYES: ICD-10-CM

## 2019-10-07 RX ORDER — AZELASTINE HYDROCHLORIDE 0.5 MG/ML
1 SOLUTION/ DROPS OPHTHALMIC 2 TIMES DAILY
Qty: 1 BOTTLE | Refills: 11 | OUTPATIENT
Start: 2019-10-07

## 2019-10-09 RX ORDER — AZELASTINE HYDROCHLORIDE 0.5 MG/ML
1 SOLUTION/ DROPS OPHTHALMIC 2 TIMES DAILY
Qty: 1 BOTTLE | Refills: 0 | Status: SHIPPED | OUTPATIENT
Start: 2019-10-09 | End: 2019-12-12

## 2019-11-04 ENCOUNTER — ANCILLARY PROCEDURE (OUTPATIENT)
Dept: MAMMOGRAPHY | Facility: CLINIC | Age: 54
End: 2019-11-04
Attending: PREVENTIVE MEDICINE
Payer: COMMERCIAL

## 2019-11-04 DIAGNOSIS — Z12.31 SCREENING MAMMOGRAM, ENCOUNTER FOR: ICD-10-CM

## 2019-11-04 PROCEDURE — 77067 SCR MAMMO BI INCL CAD: CPT | Performed by: RADIOLOGY

## 2019-11-04 PROCEDURE — 77063 BREAST TOMOSYNTHESIS BI: CPT | Performed by: RADIOLOGY

## 2019-11-07 DIAGNOSIS — R49.9 CHANGE IN VOICE: ICD-10-CM

## 2019-11-07 DIAGNOSIS — K21.9 LPRD (LARYNGOPHARYNGEAL REFLUX DISEASE): ICD-10-CM

## 2019-11-07 NOTE — TELEPHONE ENCOUNTER
"Requested Prescriptions   Pending Prescriptions Disp Refills     omeprazole (PRILOSEC) 40 MG DR capsule  Last Written Prescription Date:  11/13/18  Last Fill Quantity: 90,  # refills: 3   Last Office Visit with [facility] or [facility] prescribing provider:  02/19/19Jimmie   Future Office Visit:    Next 5 appointments (look out 90 days)    Nov 15, 2019  3:00 PM CST  PHYSICAL with Kristine Marie MD  [facility] ([facility] [address] [place] [id number] [phone]        90 capsule 3     Sig: Take 1 capsule (40 mg) by mouth daily Take 30-60 minutes before a meal.       PPI Protocol Passed - 11/7/2019  8:14 AM        Passed - Not on Clopidogrel (unless Pantoprazole ordered)        Passed - No diagnosis of osteoporosis on record        Passed - Recent (12 mo) or future (30 days) visit within the authorizing provider's specialty     Patient has had an office visit with the authorizing provider or a provider within the authorizing providers department within the previous 12 mos or has a future within next 30 days. See \"Patient Info\" tab in inbasket, or \"Choose Columns\" in Meds & Orders section of the refill encounter.              Passed - Medication is active on med list        Passed - Patient is age 18 or older        Passed - No active pregnacy on record        Passed - No positive pregnancy test in past 12 months          "

## 2019-11-15 ENCOUNTER — MYC REFILL (OUTPATIENT)
Dept: OTOLARYNGOLOGY | Facility: CLINIC | Age: 54
End: 2019-11-15

## 2019-11-15 ENCOUNTER — OFFICE VISIT (OUTPATIENT)
Dept: FAMILY MEDICINE | Facility: CLINIC | Age: 54
End: 2019-11-15
Payer: COMMERCIAL

## 2019-11-15 VITALS
HEART RATE: 93 BPM | WEIGHT: 222 LBS | HEIGHT: 61 IN | RESPIRATION RATE: 18 BRPM | DIASTOLIC BLOOD PRESSURE: 81 MMHG | OXYGEN SATURATION: 99 % | SYSTOLIC BLOOD PRESSURE: 142 MMHG | TEMPERATURE: 97.9 F | BODY MASS INDEX: 41.91 KG/M2

## 2019-11-15 DIAGNOSIS — E66.01 MORBID OBESITY (H): ICD-10-CM

## 2019-11-15 DIAGNOSIS — Z00.00 ROUTINE GENERAL MEDICAL EXAMINATION AT A HEALTH CARE FACILITY: Primary | ICD-10-CM

## 2019-11-15 DIAGNOSIS — Z12.4 SCREENING FOR MALIGNANT NEOPLASM OF CERVIX: ICD-10-CM

## 2019-11-15 DIAGNOSIS — R49.9 CHANGE IN VOICE: ICD-10-CM

## 2019-11-15 DIAGNOSIS — K21.9 LPRD (LARYNGOPHARYNGEAL REFLUX DISEASE): ICD-10-CM

## 2019-11-15 DIAGNOSIS — I10 HYPERTENSION GOAL BP (BLOOD PRESSURE) < 140/90: ICD-10-CM

## 2019-11-15 DIAGNOSIS — R87.810 CERVICAL HIGH RISK HPV (HUMAN PAPILLOMAVIRUS) TEST POSITIVE: ICD-10-CM

## 2019-11-15 PROCEDURE — G0145 SCR C/V CYTO,THINLAYER,RESCR: HCPCS | Performed by: PREVENTIVE MEDICINE

## 2019-11-15 PROCEDURE — 87624 HPV HI-RISK TYP POOLED RSLT: CPT | Performed by: PREVENTIVE MEDICINE

## 2019-11-15 PROCEDURE — 99396 PREV VISIT EST AGE 40-64: CPT | Mod: 25 | Performed by: PREVENTIVE MEDICINE

## 2019-11-15 PROCEDURE — 90471 IMMUNIZATION ADMIN: CPT | Performed by: PREVENTIVE MEDICINE

## 2019-11-15 PROCEDURE — 90682 RIV4 VACC RECOMBINANT DNA IM: CPT | Performed by: PREVENTIVE MEDICINE

## 2019-11-15 ASSESSMENT — PAIN SCALES - GENERAL: PAINLEVEL: NO PAIN (0)

## 2019-11-15 ASSESSMENT — MIFFLIN-ST. JEOR: SCORE: 1558.33

## 2019-11-15 ASSESSMENT — PATIENT HEALTH QUESTIONNAIRE - PHQ9: SUM OF ALL RESPONSES TO PHQ QUESTIONS 1-9: 3

## 2019-11-15 NOTE — PATIENT INSTRUCTIONS
At [facility], we strive to deliver an exceptional experience to you, every time we see you.  If you receive a survey in the mail, please send us back your thoughts. We really do value your feedback.    Based on your medical history, these are the current health maintenance/preventive care services that you are due for (some may have been done at this visit.)  Health Maintenance Due   Topic Date Due     DEPRESSION ACTION PLAN  1966     HIV SCREENING  12/19/1981     PNEUMOCOCCAL IMMUNIZATION 19-64 HIGHEST RISK (1 of 3 - PCV13) 12/19/1985     ZOSTER IMMUNIZATION (1 of 2) 12/19/2016     PREVENTIVE CARE VISIT  06/22/2017     PHQ-9  04/26/2019     EYE EXAM  06/19/2019     INFLUENZA VACCINE (1) 09/01/2019     HPV  10/26/2019     PAP  10/26/2019         Suggested websites for health information:  Www.eBureau.[employer] : Up to date and easily searchable information on multiple topics.  Www.[employer].gov : medication info, interactive tutorials, watch real surgeries online  Www.familydoctor.org : good info from the Academy of Family Physicians  Www.cdc.gov : public health info, travel advisories, epidemics (H1N1)  Www.aap.org : children's health info, normal development, vaccinations  Www.health.[facility].[place].us : [place] dept of health, public health issues in [place], N1N1    Your care team:                            Family Medicine Internal Medicine   MD Reagan Mathur MD Shantel Branch-Fleming, MD Katya Georgiev PA-C Nam Ho, MD Pediatrics   ASHWINI Burger, CELENA Guillermo APRN CNP   MD Michaela Benjamin MD Deborah Mielke, MD Kim Thein, ANTONIO CNP      Clinic hours: Monday - Thursday 7 am-7 pm; Fridays 7 am-5 pm.   Urgent care: Monday - Friday 11 am-9 pm; Saturday and Sunday 9 am-5 pm.  Pharmacy : Monday -Thursday 8 am-8 pm; Friday 8 am-6 pm; Saturday and Sunday 9 am-5 pm.     Clinic: [phone]   Pharmacy: [phone]      Preventive Health  Recommendations  Female Ages 50 - 64    Yearly exam: See your health care provider every year in order to  o Review health changes.   o Discuss preventive care.    o Review your medicines if your doctor has prescribed any.      Get a Pap test every three years (unless you have an abnormal result and your provider advises testing more often).    If you get Pap tests with HPV test, you only need to test every 5 years, unless you have an abnormal result.     You do not need a Pap test if your uterus was removed (hysterectomy) and you have not had cancer.    You should be tested each year for STDs (sexually transmitted diseases) if you're at risk.     Have a mammogram every 1 to 2 years.    Have a colonoscopy at age 50, or have a yearly FIT test (stool test). These exams screen for colon cancer.      Have a cholesterol test every 5 years, or more often if advised.    Have a diabetes test (fasting glucose) every three years. If you are at risk for diabetes, you should have this test more often.     If you are at risk for osteoporosis (brittle bone disease), think about having a bone density scan (DEXA).    Shots: Get a flu shot each year. Get a tetanus shot every 10 years.    Nutrition:     Eat at least 5 servings of fruits and vegetables each day.    Eat whole-grain bread, whole-wheat pasta and brown rice instead of white grains and rice.    Get adequate Calcium and Vitamin D.     Lifestyle    Exercise at least 150 minutes a week (30 minutes a day, 5 days a week). This will help you control your weight and prevent disease.    Limit alcohol to one drink per day.    No smoking.     Wear sunscreen to prevent skin cancer.     See your dentist every six months for an exam and cleaning.    See your eye doctor every 1 to 2 years.

## 2019-11-15 NOTE — PROGRESS NOTES
SUBJECTIVE:   CC: Rizwana Aguirre is an 53 year old woman who presents for preventive health visit.     Healthy Habits:    Do you get at least three servings of calcium containing foods daily (dairy, green leafy vegetables, etc.)? no    Amount of exercise or daily activities, outside of work: none    Problems taking medications regularly No    Medication side effects: No    Have you had an eye exam in the past two years? Yes next Dec 2019    Do you see a dentist twice per year? yes    Do you have sleep apnea, excessive snoring or daytime drowsiness?no        Hypertension Follow-up      Do you check your blood pressure regularly outside of the clinic? Yes at [facility]     Are you following a low salt diet? Yes    Are your blood pressures ever more than 140 on the top number (systolic) OR more   than 90 on the bottom number (diastolic), for example 140/90? No      Today's PHQ-2 Score:   PHQ-2 ( 1999 [employer]) 11/15/2019 1/16/2018   Q1: Little interest or pleasure in doing things 0 2   Q2: Feeling down, depressed or hopeless 0 2   PHQ-2 Score 0 4   Q1: Little interest or pleasure in doing things - -   Q2: Feeling down, depressed or hopeless - -   PHQ-2 Score - -       Abuse: Current or Past(Physical, Sexual or Emotional)- No  Do you feel safe in your environment? Yes        Social History     Tobacco Use     Smoking status: Never Smoker     Smokeless tobacco: Never Used   Substance Use Topics     Alcohol use: Yes     Alcohol/week: 0.0 standard drinks     Comment: Social     If you drink alcohol do you typically have >3 drinks per day or >7 drinks per week? No                     Reviewed orders with patient.  Reviewed health maintenance and updated orders accordingly - Yes  Lab work is in process  Labs reviewed in EPIC  BP Readings from Last 3 Encounters:   11/15/19 (!) 142/81   04/26/19 124/82   02/19/19 148/90    Wt Readings from Last 3 Encounters:   11/15/19 100.7 kg (222 lb)   02/19/19 99.8 kg (220 lb)   11/13/18 98.9  kg (218 lb)                  Patient Active Problem List   Diagnosis     HTN (hypertension), benign     CARDIOVASCULAR SCREENING; LDL GOAL LESS THAN 160     Hypertension goal BP (blood pressure) < 140/90     Vitamin D deficiency     Pruritus     Hyperphoria     Lesion of external ear     Classic migraine with aura     Urinary incontinence, unspecified incontinence type     Functional urinary incontinence     Atopic dermatitis, unspecified type     Atypical ductal hyperplasia of left breast     Mild aortic insufficiency     Non-rheumatic mitral regurgitation     Ductal carcinoma in situ (DCIS) of left breast     Morbid obesity (H)     Adjustment disorder with depressed mood     Nuclear sclerosis of both eyes     Cervical high risk HPV (human papillomavirus) test positive     Past Surgical History:   Procedure Laterality Date     BIOPSY BREAST NEEDLE LOCALIZATION Left 2016    Procedure: BIOPSY BREAST NEEDLE LOCALIZATION;  Surgeon: Livia Frederick MD;  Location: [facility] OR     BREAST BIOPSY, CORE RT/LT Left 10/12/2016     C  DELIVERY ONLY  x2     COLONOSCOPY WITH CO2 INSUFFLATION N/A 2017    Procedure: COLONOSCOPY WITH CO2 INSUFFLATION;  BMI 36.11  Colon cancer screening  Referring Provider-Indira Aldrich  Pharmacy [facility]  Fax [phone]  ;  Surgeon: Duane, William Charles, MD;  Location: [facility] OR       Social History     Tobacco Use     Smoking status: Never Smoker     Smokeless tobacco: Never Used   Substance Use Topics     Alcohol use: Yes     Alcohol/week: 0.0 standard drinks     Comment: Social     Family History   Problem Relation Age of Onset     C.A.D. Mother      Hypertension Mother      C.A.D. Father      Cancer Father 40        Melanoma     Hypertension Father      Other Cancer Father         melanoma     Cerebrovascular Disease Maternal Grandmother          Current Outpatient Medications   Medication Sig Dispense Refill     azelastine (OPTIVAR) 0.05 % ophthalmic solution  Place 1 drop into both eyes 2 times daily 1 Bottle 0     buPROPion (WELLBUTRIN XL) 150 MG 24 hr tablet TAKE ONE TABLET BY MOUTH ONCE DAILY IN THE MORNING 90 tablet 0     Coenzyme Q10 (COQ-10 PO) Take 2 tablets by mouth        losartan-hydrochlorothiazide (HYZAAR) 100-12.5 MG tablet Take 1 tablet by mouth daily 90 tablet 3     Omega-3 Fatty Acids (OMEGA-3 FISH OIL PO) Take 2 g by mouth daily        omeprazole (PRILOSEC) 40 MG capsule Take 1 capsule (40 mg) by mouth daily Take 30-60 minutes before a meal. 90 capsule 3     triamcinolone (KENALOG) 0.1 % cream Apply sparingly to affected area two times daily for 10-14 days. 30 g 1     Allergies   Allergen Reactions     Cantaloupe [Melon]      Scratchy throat     Shrimp Nausea     Walnuts [Nuts]      Scratchy throat       Mammogram Screening: Patient over age 50, mutual decision to screen reflected in health maintenance.    Pertinent mammograms are reviewed under the imaging tab.  History of abnormal Pap smear: YES - updated in Problem List and Health Maintenance accordingly  PAP / HPV Latest Ref Rng & Units 10/26/2018 8/7/2015 1/20/2012   PAP - NIL NIL NIL   HPV 16 DNA NEG:Negative Negative - -   HPV 18 DNA NEG:Negative Negative - -   OTHER HR HPV NEG:Negative Positive(A) - -     Reviewed and updated as needed this visit by clinical staff  Tobacco  Allergies  Meds  Problems  Med Hx  Surg Hx  Fam Hx  Soc Hx          Reviewed and updated as needed this visit by Provider  Tobacco  Allergies  Meds  Problems  Med Hx  Surg Hx  Fam Hx  Soc Hx         Past Medical History:   Diagnosis Date     Aortic insufficiency 10/16/09    Trace - per ECHO     Cervical high risk HPV (human papillomavirus) test positive 10/26/2018    See problem list     DCIS (ductal carcinoma in situ) 11/14/2016    Left breast     Heart murmur      HTN (hypertension), benign      Hyperphoria 4/8/2013     Kidney stones      Migraines      Mitral insufficiency 10/16/09    Mild - per ECHO      "Motion sickness      Seasonal allergic rhinitis      Uncomplicated asthma       Past Surgical History:   Procedure Laterality Date     BIOPSY BREAST NEEDLE LOCALIZATION Left 2016    Procedure: BIOPSY BREAST NEEDLE LOCALIZATION;  Surgeon: Livia Frederick MD;  Location: [facility]     BREAST BIOPSY, CORE RT/LT Left 10/12/2016     C  DELIVERY ONLY  x2     COLONOSCOPY WITH CO2 INSUFFLATION N/A 2017    Procedure: COLONOSCOPY WITH CO2 INSUFFLATION;  BMI 36.11  Colon cancer screening  Referring Provider-Indira Aldrich  Pharmacy [facility]  Fax [phone]  ;  Surgeon: Duane, William Charles, MD;  Location: [facility] OR       ROS:  CONSTITUTIONAL: NEGATIVE for fever, chills, change in weight  INTEGUMENTARY/SKIN: NEGATIVE for worrisome rashes, moles or lesions  EYES: NEGATIVE for vision changes or irritation  ENT: NEGATIVE for ear, mouth and throat problems  RESP: NEGATIVE for significant cough or SOB  BREAST: NEGATIVE for masses, tenderness or discharge  CV: NEGATIVE for chest pain, palpitations or peripheral edema  GI: NEGATIVE for nausea, abdominal pain, heartburn, or change in bowel habits  : NEGATIVE for unusual urinary or vaginal symptoms. No vaginal bleeding.  MUSCULOSKELETAL: NEGATIVE for significant arthralgias or myalgia  NEURO: NEGATIVE for weakness, dizziness or paresthesias  ENDOCRINE: NEGATIVE for temperature intolerance, skin/hair changes  HEME/ALLERGY/IMMUNE: NEGATIVE for bleeding problems  PSYCHIATRIC: NEGATIVE for changes in mood or affect     OBJECTIVE:   BP (!) 142/81 (BP Location: Left arm, Patient Position: Chair, Cuff Size: Adult Large)   Pulse 93   Temp 97.9  F (36.6  C) (Oral)   Resp 18   Ht 1.556 m (5' 1.25\")   Wt 100.7 kg (222 lb)   LMP 2017   SpO2 99%   BMI 41.60 kg/m    EXAM:  GENERAL APPEARANCE: healthy, alert and no distress  EYES: Eyes grossly normal to inspection, PERRL and conjunctivae and sclerae normal  HENT: ear canals and TM's normal, nose and " mouth without ulcers or lesions, oropharynx clear and oral mucous membranes moist  NECK: no adenopathy, no asymmetry, masses  RESP: lungs clear to auscultation - no rales, rhonchi or wheezes  BREAST: normal without masses, tenderness or nipple discharge and no palpable axillary masses or adenopathy, lumpectomy scar noted on the left breast   CV: regular rate and rhythm, normal S1 S2, no S3 or S4, no murmur, click or rub, no peripheral edema and peripheral pulses strong  ABDOMEN: soft, nontender, no rebound or guarding    (female): normal female external genitalia, normal urethral meatus, vaginal mucosal atrophy noted, normal cervix, adnexae, and uterus without masses or abnormal discharge, cervix clearly seen during exam   MS: no musculoskeletal defects are noted and gait is age appropriate without ataxia  SKIN: no suspicious lesions or rashes  NEURO: Normal strength and tone, sensory exam grossly normal, mentation intact and speech normal  PSYCH: mentation appears normal and affect normal/bright    Diagnostic Test Results:  Labs reviewed in Epic  No results found for this or any previous visit (from the past 24 hour(s)).    ASSESSMENT/PLAN:   Rizwana was seen today for physical.    Diagnoses and all orders for this visit:    Routine general medical examination at a health care facility  -     Lipid panel reflex to direct LDL Fasting; Future    Screening for malignant neoplasm of cervix  -     Pap imaged thin layer screen with HPV - recommended age 30 - 65 years (select HPV order below)  -     HPV High Risk Types DNA Cervical    Hypertension goal BP (blood pressure) < 140/90  -     Basic metabolic panel; Future  -     Albumin Random Urine Quantitative with Creat Ratio; Future  -elevated in clinic, normal outside of clinic, checked at [facility] regularly     Morbid obesity (H)  -doing [facility]     Other orders  -          ADMIN VACCINE, FIRST [24802]  -     FLU VAC, QUADRIVALENT (RIV4) RECOMBINANT DNA,  "IM        COUNSELING:   Reviewed preventive health counseling, as reflected in patient instructions       Regular exercise       Healthy diet/nutrition       Vision screening       Immunizations    Vaccinated for: Influenza          Estimated body mass index is 41.6 kg/m  as calculated from the following:    Height as of this encounter: 1.556 m (5' 1.25\").    Weight as of this encounter: 100.7 kg (222 lb).    Weight management plan: Discussed healthy diet and exercise guidelines     reports that she has never smoked. She has never used smokeless tobacco.      Counseling Resources:  ATP IV Guidelines  Pooled Cohorts Equation Calculator  Breast Cancer Risk Calculator  FRAX Risk Assessment  ICSI Preventive Guidelines  Dietary Guidelines for Americans, 2010  USDA's MyPlate  ASA Prophylaxis  Lung CA Screening    Kristine Marie MD MPH    [facility]  "

## 2019-11-18 RX ORDER — OMEPRAZOLE 40 MG/1
40 CAPSULE, DELAYED RELEASE ORAL DAILY
Qty: 90 CAPSULE | Refills: 3 | Status: SHIPPED | OUTPATIENT
Start: 2019-11-18 | End: 2020-10-30

## 2019-11-18 NOTE — TELEPHONE ENCOUNTER
"Routing request to patient's PCP.   Has not seen Dr. Cox 11/13/2018.    Requested Prescriptions   Pending Prescriptions Disp Refills     omeprazole (PRILOSEC) 40 MG DR capsule 90 capsule 3     Sig: Take 1 capsule (40 mg) by mouth daily Take 30-60 minutes before a meal.       PPI Protocol Failed - 11/18/2019  9:30 AM        Failed - Recent (12 mo) or future (30 days) visit within the authorizing provider's specialty     Patient has had an office visit with the authorizing provider or a provider within the authorizing providers department within the previous 12 mos or has a future within next 30 days. See \"Patient Info\" tab in inbasket, or \"Choose Columns\" in Meds & Orders section of the refill encounter.              Passed - Not on Clopidogrel (unless Pantoprazole ordered)        Passed - No diagnosis of osteoporosis on record        Passed - Medication is active on med list        Passed - Patient is age 18 or older        Passed - No active pregnacy on record        Passed - No positive pregnancy test in past 12 months        Anna Ward RN  "

## 2019-11-20 LAB
COPATH REPORT: NORMAL
PAP: NORMAL

## 2019-11-22 RX ORDER — OMEPRAZOLE 40 MG/1
40 CAPSULE, DELAYED RELEASE ORAL DAILY
Qty: 90 CAPSULE | Refills: 3 | OUTPATIENT
Start: 2019-11-22

## 2019-11-22 NOTE — TELEPHONE ENCOUNTER
I already refilled the script on 11/18/19. Not sure what the issue is here.  Thanks,  Kristine Marie MD MPH

## 2019-11-25 NOTE — TELEPHONE ENCOUNTER
Called and informed patient that the prescription has already been refilled as of 11/18/19 and should be at her pharmacy. Patient advised to call back if there are any complications or further questions.    Ella Carrillo CMA on 11/25/2019 at 9:23 AM

## 2019-12-04 DIAGNOSIS — Z00.00 ROUTINE GENERAL MEDICAL EXAMINATION AT A HEALTH CARE FACILITY: ICD-10-CM

## 2019-12-04 DIAGNOSIS — I10 HYPERTENSION GOAL BP (BLOOD PRESSURE) < 140/90: ICD-10-CM

## 2019-12-04 LAB
ANION GAP SERPL CALCULATED.3IONS-SCNC: 6 MMOL/L (ref 3–14)
BUN SERPL-MCNC: 20 MG/DL (ref 7–30)
CALCIUM SERPL-MCNC: 9.2 MG/DL (ref 8.5–10.1)
CHLORIDE SERPL-SCNC: 106 MMOL/L (ref 94–109)
CHOLEST SERPL-MCNC: 234 MG/DL
CO2 SERPL-SCNC: 27 MMOL/L (ref 20–32)
CREAT SERPL-MCNC: 0.7 MG/DL (ref 0.52–1.04)
CREAT UR-MCNC: 246 MG/DL
GFR SERPL CREATININE-BSD FRML MDRD: >90 ML/MIN/{1.73_M2}
GLUCOSE SERPL-MCNC: 99 MG/DL (ref 70–99)
HDLC SERPL-MCNC: 65 MG/DL
LDLC SERPL CALC-MCNC: 145 MG/DL
MICROALBUMIN UR-MCNC: 21 MG/L
MICROALBUMIN/CREAT UR: 8.7 MG/G CR (ref 0–25)
NONHDLC SERPL-MCNC: 169 MG/DL
POTASSIUM SERPL-SCNC: 3.7 MMOL/L (ref 3.4–5.3)
SODIUM SERPL-SCNC: 139 MMOL/L (ref 133–144)
TRIGL SERPL-MCNC: 118 MG/DL

## 2019-12-04 PROCEDURE — 36415 COLL VENOUS BLD VENIPUNCTURE: CPT | Performed by: PREVENTIVE MEDICINE

## 2019-12-04 PROCEDURE — 80061 LIPID PANEL: CPT | Performed by: PREVENTIVE MEDICINE

## 2019-12-04 PROCEDURE — 80048 BASIC METABOLIC PNL TOTAL CA: CPT | Performed by: PREVENTIVE MEDICINE

## 2019-12-04 PROCEDURE — 82043 UR ALBUMIN QUANTITATIVE: CPT | Performed by: PREVENTIVE MEDICINE

## 2019-12-05 ENCOUNTER — MYC MEDICAL ADVICE (OUTPATIENT)
Dept: FAMILY MEDICINE | Facility: CLINIC | Age: 54
End: 2019-12-05

## 2019-12-05 NOTE — TELEPHONE ENCOUNTER
Labs complete from 12/4/19.  Chart reviewed. Results are final.   Routing to provider to review and interpret.     Shagufta Brady RN

## 2019-12-06 NOTE — TELEPHONE ENCOUNTER
Noted. I will send her a My chart message with lab interpretation.  Thanks,  Kristine Marie MD MPH

## 2019-12-12 ENCOUNTER — OFFICE VISIT (OUTPATIENT)
Dept: OPTOMETRY | Facility: CLINIC | Age: 54
End: 2019-12-12
Payer: COMMERCIAL

## 2019-12-12 DIAGNOSIS — H10.13 ALLERGIC CONJUNCTIVITIS OF BOTH EYES: ICD-10-CM

## 2019-12-12 DIAGNOSIS — Z01.00 EXAMINATION OF EYES AND VISION: Primary | ICD-10-CM

## 2019-12-12 DIAGNOSIS — H52.4 PRESBYOPIA: ICD-10-CM

## 2019-12-12 DIAGNOSIS — H52.13 MYOPIA OF BOTH EYES: ICD-10-CM

## 2019-12-12 PROCEDURE — 92310 CONTACT LENS FITTING OU: CPT | Mod: GA | Performed by: OPTOMETRIST

## 2019-12-12 PROCEDURE — 92015 DETERMINE REFRACTIVE STATE: CPT | Performed by: OPTOMETRIST

## 2019-12-12 PROCEDURE — 92014 COMPRE OPH EXAM EST PT 1/>: CPT | Performed by: OPTOMETRIST

## 2019-12-12 RX ORDER — AZELASTINE HYDROCHLORIDE 0.5 MG/ML
1 SOLUTION/ DROPS OPHTHALMIC 2 TIMES DAILY
Qty: 1 BOTTLE | Refills: 11 | Status: SHIPPED | OUTPATIENT
Start: 2019-12-12 | End: 2023-02-03

## 2019-12-12 ASSESSMENT — REFRACTION_CURRENTRX
OS_BASECURVE: 8.70
OD_SPHERE: -3.75
OS_BRAND: ALCON DAILIES TOTAL 1 BC 8.5, D 14.1
OD_SPHERE: -3.75
OS_SPHERE: -5.25
OD_DIAMETER: 14.0
OS_SPHERE: -5.25
OD_BRAND: ALCON DAILIES TOTAL 1 BC 8.5, D 14.1
OS_BRAND: DAILIES AQUA COMFORT PLUS
OS_DIAMETER: 14.0
OD_BRAND: DAILIES AQUA COMFORT PLUS
OD_BASECURVE: 8.70

## 2019-12-12 ASSESSMENT — VISUAL ACUITY
CORRECTION_TYPE: GLASSES
OD_CC: 20/20
OD_CC: 20/40
OS_CC: 20/20
OS_CC+: -1
OS_SC: 20/400-
METHOD: SNELLEN - LINEAR
OS_CC: 20/30
OD_SC: 20/400-

## 2019-12-12 ASSESSMENT — REFRACTION_WEARINGRX
OD_SPHERE: -2.00
OS_VBASE: DOWN
OD_ADD: +2.50
SPECS_TYPE: PAL
OD_CYLINDER: SPHERE
OS_SPHERE: -5.75
OD_VBASE: UP
OS_VPRISM: 1.0
OS_CYLINDER: +0.50
OS_AXIS: 135
OD_ADD: +2.50
OD_VPRISM: 1.0
OS_VBASE: DOWN
OS_ADD: +2.50
OS_SPHERE: -5.75
OS_AXIS: 135
OS_ADD: +2.50
OD_SPHERE: -5.75
OD_VBASE: UP
OS_VPRISM: 1.0
OS_VPRISM: 1.0
OS_CYLINDER: +0.50
OD_CYLINDER: SPHERE
OS_ADD: +2.50
OD_SPHERE: -5.75
OS_SPHERE: PLANO
OD_VPRISM: 1.0
OD_VBASE: UP
OD_VPRISM: 1.0
OS_VBASE: DOWN
OD_ADD: +2.50

## 2019-12-12 ASSESSMENT — CONF VISUAL FIELD
OD_NORMAL: 1
OS_NORMAL: 1

## 2019-12-12 ASSESSMENT — SLIT LAMP EXAM - LIDS
COMMENTS: MEIBOMIAN GLAND DYSFUNCTION
COMMENTS: MEIBOMIAN GLAND DYSFUNCTION

## 2019-12-12 ASSESSMENT — CUP TO DISC RATIO
OS_RATIO: 0.2
OD_RATIO: 0.2

## 2019-12-12 ASSESSMENT — REFRACTION_MANIFEST
OD_SPHERE: -5.75
OS_SPHERE: -5.75
OS_CYLINDER: +0.50
OD_ADD: +2.50
OS_ADD: +2.50
OS_AXIS: 135

## 2019-12-12 ASSESSMENT — TONOMETRY
OD_IOP_MMHG: 15
IOP_METHOD: TONOPEN
OS_IOP_MMHG: 13

## 2019-12-12 ASSESSMENT — EXTERNAL EXAM - LEFT EYE: OS_EXAM: NORMAL

## 2019-12-12 ASSESSMENT — REFRACTION_FINALRX
OD_VPRISM: 1.0
OS_VPRISM: 1.0

## 2019-12-12 ASSESSMENT — EXTERNAL EXAM - RIGHT EYE: OD_EXAM: NORMAL

## 2019-12-12 NOTE — PROGRESS NOTES
Chief Complaint   Patient presents with     Annual Eye Exam     More contacts fit fee ok     Accompanied by self  Previous contact lens wearer? Yes: dailies aqua comfort +  Comfort of contact lenses :fine  Satisfied with current lenses: Yes        Last Eye Exam: 6-  Dilated Previously: Yes    What are you currently using to see?  glasses and contacts    Distance Vision Acuity: Satisfied with vision    Near Vision Acuity: Not satisfied     Eye Comfort: watery and itchy  Do you use eye drops? : Yes: optivar patient needs refill  Occupation or Hobbies:  and development       Sabi Ramirez Optometric Assistant, A.B.O.C.     Medical, surgical and family histories reviewed and updated 12/12/2019.       OBJECTIVE: See Ophthalmology exam    ASSESSMENT:    ICD-10-CM    1. Examination of eyes and vision Z01.00 EYE EXAM (SIMPLE-NONBILLABLE)   2. Myopia of both eyes H52.13 REFRACTION     CONTACT LENS FITTING,BILAT   3. Presbyopia H52.4 REFRACTION     CONTACT LENS FITTING,BILAT   4. Allergic conjunctivitis of both eyes H10.13 EYE EXAM (SIMPLE-NONBILLABLE)     azelastine (OPTIVAR) 0.05 % ophthalmic solution      PLAN:     Patient Instructions   Eyeglass prescription given.    Optivar- 1 drop both eyes 2 x day as needed for itchy eyes.    Heat to the eyes daily for 10-15 minutes nightly with warm washcloth or reusable gel masks from the pharmacy or [employer] heat masks can be purchased at [employer].    Systane Complete 1 drop both eyes 2-4 x day or other artificial tear.    Return in 1 year for a complete eye exam or sooner if needed.    Joshua Rothman, OD

## 2019-12-12 NOTE — LETTER
12/12/2019         RE: Rizwana Aguirre  [address] [id number]        Dear Colleague,    Thank you for referring your patient, Rizwana Aguirre, to the [facility]. Please see a copy of my visit note below.    Chief Complaint   Patient presents with     Annual Eye Exam     More contacts fit fee ok     Accompanied by self  Previous contact lens wearer? Yes: dailies aqua comfort +  Comfort of contact lenses :fine  Satisfied with current lenses: Yes        Last Eye Exam: 6-  Dilated Previously: Yes    What are you currently using to see?  glasses and contacts    Distance Vision Acuity: Satisfied with vision    Near Vision Acuity: Not satisfied     Eye Comfort: watery and itchy  Do you use eye drops? : Yes: optivar patient needs refill  Occupation or Hobbies:  and development       Sabi Ramirez Optometric Assistant, A.B.O.C.     Medical, surgical and family histories reviewed and updated 12/12/2019.       OBJECTIVE: See Ophthalmology exam    ASSESSMENT:    ICD-10-CM    1. Examination of eyes and vision Z01.00 EYE EXAM (SIMPLE-NONBILLABLE)   2. Myopia of both eyes H52.13 REFRACTION     CONTACT LENS FITTING,BILAT   3. Presbyopia H52.4 REFRACTION     CONTACT LENS FITTING,BILAT   4. Allergic conjunctivitis of both eyes H10.13 EYE EXAM (SIMPLE-NONBILLABLE)     azelastine (OPTIVAR) 0.05 % ophthalmic solution      PLAN:     Patient Instructions   Eyeglass prescription given.    Optivar- 1 drop both eyes 2 x day as needed for itchy eyes.    Heat to the eyes daily for 10-15 minutes nightly with warm washcloth or reusable gel masks from the pharmacy or [employer] heat masks can be purchased at [employer].    Systane Complete 1 drop both eyes 2-4 x day or other artificial tear.    Return in 1 year for a complete eye exam or sooner if needed.    Joshua Rothman, OD                         Again, thank you for allowing me to participate in the care of your patient.         Sincerely,        Joshua Rothman, OD

## 2019-12-12 NOTE — PATIENT INSTRUCTIONS
Eyeglass prescription given.    Optivar- 1 drop both eyes 2 x day as needed for itchy eyes.    Heat to the eyes daily for 10-15 minutes nightly with warm washcloth or reusable gel masks from the pharmacy or [employer] heat masks can be purchased at Amazon.    Systane Complete 1 drop both eyes 2-4 x day or other artificial tear.    Return in 1 year for a complete eye exam or sooner if needed.    Joshua Rothman, OD    The affects of the dilating drops last for 4- 6 hours.  You will be more sensitive to light and vision will be blurry up close.  Mydriatic sunglasses were given if needed.    Use one drop of artificial tears both eyes 3-4 x daily.  Continue to use the drops regardless if your eyes are comfortable.  Artificial tears work best as a preventative and not as well after your eyes are starting to bother you.  It may take 4- 6 weeks of using the drops before you notice improvement.  If after that time you are still having problems schedule an appointment for an evaluation and discussion of different treatments such as Restasis or Xiidra.  Dry eyes are a chronic condition and you may have more symptoms at certain times of the year.    Excess tearing can be due to the right tears not working properly or a blockage in the tear drainage system.  You can try using artificial tears 1 drop right eye 3-4 x day.  If the excess tearing is bothersome after 3-4 weeks and that doesn't help we can send you for further testing on the puncta which allows the tears to drain.  This would entail a referral to our oculo plastic specialist at the [facility].    Brands of drops that are recommended are:    Systane Complete  Systane Ultra  Systane Balance  Refresh Advanced Optive  Blink    If you are using drops more than 4 x day or have sensitivities to preservatives I recommend non preserved artificial tears.  These come in 1 use vials.  They can be used every 1-2 hours.    Brands of drops that are recommended  are:    Systane- preservative free  Refresh-  preservative free  Blink- preservative free    Gels or ointment can be used at night.    Brands recommended are:    Systane Gel  Refresh Gel  Blink Gel  Genteal Gel    Systane night time (ointment)  Refresh Celluvisc  Refresh PM (ointment)      Visine, Clear Eyes or Murine (drops that get the red out) can irritate the eyes and cause a rebound effect where the eyes become more red and you end up using more drops.  Avoid drops containing tetrahydrozoline, naphazoline, phenylephrine, oxymetazoline.      OTC Lumify is a newer product that gives immediate redness relief with out the rebound effect.  Use as needed to take the redness out.    Artificial tears may be used with other drops (such as allergy, glaucoma, antibiotics) around the same time.  Be sure to wait 5 minutes in between drops.    Heat to the eyelids can also improve your symptoms of dry eyes.  R[employer] heat masks can be purchased at Amazon to be used daily for 10-15 minutes.  Other options are gel masks that can be put in the microwave and purchased at most pharmacies.        Optometry Providers       Clinic Locations                                 Telephone Number   Dr. Qiana [place] and [place]   [place] [phone]     [facility] Hours:                [facility] Hours:       [facility] Hours:   [address]   [address]     [address] [place] [address]   Fr[place] Hopkins [place] [address]    [place] [address]  Phone: [phone]                    Phone: [phone]     Phone: [phone]                      Monday 8:00-7:00                          Monday 8:00-7:00                          Monday 8:00-7:00              Tuesday 8:00-6:00                          Tuesday 8:00-7:00                          Tuesday 8:00-7:00              Wednesday 8:00-6:00                  Wednesday  8:00-7:00                   Wednesday 8:00-7:00      Thursday 8:00-6:00                        Thursday 8:00-7:00                         Thursday 8:00-7:00            Friday 8:00-5:00                              Friday 8:00-5:00                              Friday 8:00-5:00    [facility] Hours:   [address] [place] [address]  [phone]    Monday 8:00-7:00  Tuesday 8:00-7:00 Wednesday 8:00-7:00 Thursday 8:00-7:00  Friday 8:00-5:00  Please log on to [email or web address] to order your contact lenses.  The link is found on the [facility] page.  As always, Thank you for trusting us with your health care needs!

## 2019-12-23 ENCOUNTER — OFFICE VISIT (OUTPATIENT)
Dept: FAMILY MEDICINE | Facility: CLINIC | Age: 54
End: 2019-12-23
Payer: COMMERCIAL

## 2019-12-23 VITALS
TEMPERATURE: 98.5 F | BODY MASS INDEX: 42.93 KG/M2 | HEIGHT: 61 IN | OXYGEN SATURATION: 99 % | WEIGHT: 227.4 LBS | RESPIRATION RATE: 16 BRPM | DIASTOLIC BLOOD PRESSURE: 90 MMHG | HEART RATE: 64 BPM | SYSTOLIC BLOOD PRESSURE: 146 MMHG

## 2019-12-23 DIAGNOSIS — R87.810 CERVICAL HIGH RISK HPV (HUMAN PAPILLOMAVIRUS) TEST POSITIVE: Primary | ICD-10-CM

## 2019-12-23 PROCEDURE — 57452 EXAM OF CERVIX W/SCOPE: CPT | Performed by: FAMILY MEDICINE

## 2019-12-23 PROCEDURE — 99207 ZZC DROP WITH A PROCEDURE: CPT | Performed by: FAMILY MEDICINE

## 2019-12-23 ASSESSMENT — PAIN SCALES - GENERAL: PAINLEVEL: NO PAIN (0)

## 2019-12-23 ASSESSMENT — MIFFLIN-ST. JEOR: SCORE: 1577.82

## 2019-12-23 NOTE — PROGRESS NOTES
Rizwana Aguirre is a 54 year old female who presents for initial colposcopy. Pap smear 1 months ago showed: normal with HR HPV other. The prior pap showed same last year.     Past Medical History:   Diagnosis Date     Aortic insufficiency 10/16/09    Trace - per ECHO     Cervical high risk HPV (human papillomavirus) test positive 10/26/2018, 11/15/19    See problem list     DCIS (ductal carcinoma in situ) 11/14/2016    Left breast     Heart murmur      HTN (hypertension), benign      Hyperphoria 4/8/2013     Kidney stones      Migraines      Mitral insufficiency 10/16/09    Mild - per ECHO     Motion sickness      Seasonal allergic rhinitis      Uncomplicated asthma      Family History   Problem Relation Age of Onset     C.A.D. Mother      Hypertension Mother      C.A.D. Father      Cancer Father 40        Melanoma     Hypertension Father      Other Cancer Father         melanoma     Cerebrovascular Disease Maternal Grandmother        Previous history of abnormal paps?: No  History of cryotherapy (freezing)?: : No  History of veneral diseases: : No  Do you desire testing for any of these diseases? : No  History of genital warts:  No  Visible warts now?:  No  Previously treated? If so, how?:  No     Patient's last menstrual period was 04/12/2017.  Type of contraception: post menopausal status    History   Smoking Status     Never Smoker   Smokeless Tobacco     Never Used     History of sexual abuse:  No  Allergies as of 12/23/2019 - Reviewed 12/23/2019   Allergen Reaction Noted     Cantaloupe [melon]  12/14/2016     Shrimp Nausea 06/22/2016     Walnuts [nuts]  12/14/2016        PROCEDURE:  Before the procedure, it was ensured that the patient was educated regarding the nature of her findings to date, the implications of them, and what was to be done. She has been made aware of the role of HPV, the natural history of infection, ways to minimize her future risk, the effect of HPV on the cervix, and treatment options  available should they be indicated. The   pathophysiology of the cervix, including a discussion of squamous vs. endometrial cells, and squamous metaplasia have all been reviewed, using illustrations and sketches. The details of the colposcopic procedure were reviewed, as well as the risks of missed diagnoses, pain, infection and bleeding. All questions were answered before proceeding, and informed consent was therefore obtained.    Bimanual examination: was performed and was unremarkable.  Unenhanced examination of the cervix was atrophic vaginitis  Pap repeated?:  No  SCJ seen?:  no  Endocervical speculum needed?:  No  ECC done?:  No  Lugol's solution used?:  Yes normal uptake  Satisfactory examination?:  yes    Vaginal vault: normal to cursory inspection   Urethra normal?:  yes  Labia normal?:  yes  Perineum normal?:  yes  Rectum normal?:  yes    FINDINGS:  Cervix: no visible lesions and atrophic vaginitis  Procedure: biopsies taken (not including ECC): 0.    Procedure summary: Patient tolerated procedure well     Assessment: Normal exam without visible pathology    Plan: follow up in 1 year for repeat pap smear and consider getting HPV vaccine series.  Patient to check with insurance.    Laura Mitchell M.D.

## 2020-01-17 PROBLEM — R87.810 CERVICAL HIGH RISK HPV (HUMAN PAPILLOMAVIRUS) TEST POSITIVE: Status: ACTIVE | Noted: 2018-10-26

## 2020-02-19 DIAGNOSIS — F43.21 ADJUSTMENT DISORDER WITH DEPRESSED MOOD: ICD-10-CM

## 2020-02-20 RX ORDER — BUPROPION HYDROCHLORIDE 150 MG/1
TABLET ORAL
Qty: 90 TABLET | Refills: 0 | Status: SHIPPED | OUTPATIENT
Start: 2020-02-20 | End: 2020-05-26

## 2020-02-20 NOTE — TELEPHONE ENCOUNTER
"Requested Prescriptions   Pending Prescriptions Disp Refills     buPROPion 150 MG PO 24 hr tablet  Last Written Prescription Date:  11/12/19  Last Fill Quantity: 90,  # refills: 0   Last Office Visit with [facility] or [facility] prescribing provider:  12/23/19-Des Mathews   Future Office Visit:    90 tablet 0       SSRIs Protocol Passed - 2/19/2020  8:33 AM        Passed - PHQ-9 score less than 5 in past 6 months     Please review last PHQ-9 score.           Passed - Medication is Bupropion     If the medication is Bupropion (Wellbutrin), and the patient is taking for smoking cessation; OK to refill.          Passed - Medication is active on med list        Passed - Patient is age 18 or older        Passed - No active pregnancy on record        Passed - No positive pregnancy test in last 12 months        Passed - Recent (6 mo) or future (30 days) visit within the authorizing provider's specialty     Patient had office visit in the last 6 months or has a visit in the next 30 days with authorizing provider or within the authorizing provider's specialty.  See \"Patient Info\" tab in inbasket, or \"Choose Columns\" in Meds & Orders section of the refill encounter.              "
PHQ-9 score:    PHQ 11/15/2019   PHQ-9 Total Score 3   Q9: Thoughts of better off dead/self-harm past 2 weeks Not at all     Prescription approved per [facility] Refill Protocol.    Melanie Sheppard RN  [facility]          
Awake/Alert

## 2020-04-04 DIAGNOSIS — I10 HYPERTENSION GOAL BP (BLOOD PRESSURE) < 140/90: ICD-10-CM

## 2020-04-04 NOTE — TELEPHONE ENCOUNTER
"Requested Prescriptions   Pending Prescriptions Disp Refills     losartan-hydrochlorothiazide (HYZAAR) 100-12.5 MG tablet [Pharmacy Med Name: LOSARTAN-HCTZ 100-12.5 MG TAB] 90 tablet 0     Sig: TAKE 1 TABLET BY MOUTH EVERY DAY       Angiotensin-II Receptors Failed - 4/4/2020  9:29 AM        Failed - Last blood pressure under 140/90 in past 12 months     BP Readings from Last 3 Encounters:   12/23/19 (!) 146/90   11/15/19 (!) 142/81   04/26/19 124/82                 Passed - Recent (12 mo) or future (30 days) visit within the authorizing provider's specialty     Patient has had an office visit with the authorizing provider or a provider within the authorizing providers department within the previous 12 mos or has a future within next 30 days. See \"Patient Info\" tab in inbasket, or \"Choose Columns\" in Meds & Orders section of the refill encounter.              Passed - Medication is active on med list        Passed - Patient is age 18 or older        Passed - No active pregnancy on record        Passed - Normal serum creatinine on file in past 12 months     Recent Labs   Lab Test 12/04/19  0731   CR 0.70       Ok to refill medication if creatinine is low          Passed - Normal serum potassium on file in past 12 months     Recent Labs   Lab Test 12/04/19  0731   POTASSIUM 3.7                    Passed - No positive pregnancy test in past 12 months       Diuretics (Including Combos) Protocol Failed - 4/4/2020  9:29 AM        Failed - Blood pressure under 140/90 in past 12 months     BP Readings from Last 3 Encounters:   12/23/19 (!) 146/90   11/15/19 (!) 142/81   04/26/19 124/82                 Passed - Recent (12 mo) or future (30 days) visit within the authorizing provider's specialty     Patient has had an office visit with the authorizing provider or a provider within the authorizing providers department within the previous 12 mos or has a future within next 30 days. See \"Patient Info\" tab in inbasket, or \"Choose " "Columns\" in Meds & Orders section of the refill encounter.              Passed - Medication is active on med list        Passed - Patient is age 18 or older        Passed - No active pregancy on record        Passed - Normal serum creatinine on file in past 12 months     Recent Labs   Lab Test 12/04/19  0731   CR 0.70              Passed - Normal serum potassium on file in past 12 months     Recent Labs   Lab Test 12/04/19  0731   POTASSIUM 3.7                    Passed - Normal serum sodium on file in past 12 months     Recent Labs   Lab Test 12/04/19  0731                 Passed - No positive pregnancy test in past 12 months         Last Written Prescription Date: 2/19/19   Last Fill Quantity: 90,  # refills: 3   Last office visit: 12/23/2019 with prescribing provider: Des Mathews    Future Office Visit:    "

## 2020-04-07 RX ORDER — LOSARTAN POTASSIUM AND HYDROCHLOROTHIAZIDE 12.5; 1 MG/1; MG/1
TABLET ORAL
Qty: 90 TABLET | Refills: 0 | Status: SHIPPED | OUTPATIENT
Start: 2020-04-07 | End: 2020-07-02

## 2020-04-07 NOTE — TELEPHONE ENCOUNTER
Routing refill request to provider for review/approval because:  BP readings above goal, provider to review  Fails protocol    Melanie Sheppard RN  [facility]

## 2020-05-07 ENCOUNTER — TELEPHONE (OUTPATIENT)
Dept: FAMILY MEDICINE | Facility: CLINIC | Age: 55
End: 2020-05-07

## 2020-05-07 NOTE — TELEPHONE ENCOUNTER
Panel Management Review   One phone call and send letter if unable to reach them or Ethonovahart message and send letter if not read after 2 weeks (You will get a message to your inbasket)      BP Readings from Last 1 Encounters:   12/23/19 (!) 146/90        Health Maintenance Due   Topic Date Due     HIV SCREENING  12/19/1981     ZOSTER IMMUNIZATION (1 of 2) 12/19/2016     PHQ-2  01/01/2020     PAP FOLLOW-UP  12/23/2020     HPV FOLLOW-UP  12/23/2020        Fail List measure: BP check        Patient is due/failing the following:   BP CHECK    Action needed:   Patient needs nurse only appointment.    Type of outreach:    Phone, left message for patient to call back.   Please help schedule Drive Up BP check  Questions for provider review:    None                                                                                       .                                            Jordana Ward

## 2020-05-21 DIAGNOSIS — F43.21 ADJUSTMENT DISORDER WITH DEPRESSED MOOD: ICD-10-CM

## 2020-05-26 RX ORDER — BUPROPION HYDROCHLORIDE 150 MG/1
TABLET ORAL
Qty: 90 TABLET | Refills: 0 | Status: SHIPPED | OUTPATIENT
Start: 2020-05-26 | End: 2020-07-31

## 2020-05-26 NOTE — TELEPHONE ENCOUNTER
Routing refill request to provider for review/approval because:  PHQ-9 is not current   Fails protocol      PHQ-9 score:    PHQ 11/15/2019   PHQ-9 Total Score 3   Q9: Thoughts of better off dead/self-harm past 2 weeks Not at all

## 2020-06-30 ENCOUNTER — MYC REFILL (OUTPATIENT)
Dept: FAMILY MEDICINE | Facility: CLINIC | Age: 55
End: 2020-06-30

## 2020-06-30 DIAGNOSIS — E78.5 HYPERLIPIDEMIA LDL GOAL <130: Primary | ICD-10-CM

## 2020-06-30 DIAGNOSIS — I10 HYPERTENSION GOAL BP (BLOOD PRESSURE) < 140/90: ICD-10-CM

## 2020-07-01 RX ORDER — LOSARTAN POTASSIUM AND HYDROCHLOROTHIAZIDE 12.5; 1 MG/1; MG/1
1 TABLET ORAL DAILY
Qty: 90 TABLET | Refills: 0 | OUTPATIENT
Start: 2020-07-01

## 2020-07-02 ENCOUNTER — MYC REFILL (OUTPATIENT)
Dept: FAMILY MEDICINE | Facility: CLINIC | Age: 55
End: 2020-07-02

## 2020-07-02 DIAGNOSIS — I10 HYPERTENSION GOAL BP (BLOOD PRESSURE) < 140/90: ICD-10-CM

## 2020-07-02 RX ORDER — LOSARTAN POTASSIUM AND HYDROCHLOROTHIAZIDE 12.5; 1 MG/1; MG/1
1 TABLET ORAL DAILY
Qty: 90 TABLET | Refills: 0 | Status: CANCELLED | OUTPATIENT
Start: 2020-07-02

## 2020-07-02 RX ORDER — LOSARTAN POTASSIUM AND HYDROCHLOROTHIAZIDE 12.5; 1 MG/1; MG/1
TABLET ORAL
Qty: 30 TABLET | Refills: 0 | Status: SHIPPED | OUTPATIENT
Start: 2020-07-02 | End: 2020-07-31

## 2020-07-02 NOTE — TELEPHONE ENCOUNTER
Routing refill request to provider for review/approval because:  Failed BP reading    Laurel Samson RN, [facility] Triage

## 2020-07-02 NOTE — TELEPHONE ENCOUNTER
Called and spoke to there patient and offered to schedule these appt. Patient is driving and will call back to schedule.  Shelby Kumar MA  [facility]  2nd Floor  Primary Care

## 2020-07-02 NOTE — TELEPHONE ENCOUNTER
Refilled x 30 days but patient due for ancillary blood pressure check and fasting labs for cholesterol.  Please assister her in scheduling.  Thanks!  Jackelyn

## 2020-07-03 NOTE — TELEPHONE ENCOUNTER
Already addressed by provider on 7/2/20    Shagufta Brady RN  [facility]

## 2020-07-29 ENCOUNTER — ALLIED HEALTH/NURSE VISIT (OUTPATIENT)
Dept: NURSING | Facility: CLINIC | Age: 55
End: 2020-07-29
Payer: COMMERCIAL

## 2020-07-29 VITALS — SYSTOLIC BLOOD PRESSURE: 140 MMHG | DIASTOLIC BLOOD PRESSURE: 78 MMHG

## 2020-07-29 DIAGNOSIS — E78.5 HYPERLIPIDEMIA LDL GOAL <130: ICD-10-CM

## 2020-07-29 DIAGNOSIS — I10 HTN (HYPERTENSION): Primary | ICD-10-CM

## 2020-07-29 LAB
CHOLEST SERPL-MCNC: 236 MG/DL
HDLC SERPL-MCNC: 58 MG/DL
LDLC SERPL CALC-MCNC: 144 MG/DL
NONHDLC SERPL-MCNC: 178 MG/DL
TRIGL SERPL-MCNC: 172 MG/DL

## 2020-07-29 PROCEDURE — 99207 ZZC NO CHARGE NURSE ONLY: CPT

## 2020-07-29 PROCEDURE — 36415 COLL VENOUS BLD VENIPUNCTURE: CPT | Performed by: NURSE PRACTITIONER

## 2020-07-29 PROCEDURE — 80061 LIPID PANEL: CPT | Performed by: NURSE PRACTITIONER

## 2020-07-29 NOTE — NURSING NOTE
Rizwana Aguirre is a 54 year old patient who comes in today for a Blood Pressure check.  Initial BP:  BP (!) 160/84 (BP Location: Right arm, Patient Position: Sitting, Cuff Size: Adult Large)   LMP 04/12/2017      Data Unavailable  Disposition: results routed to provider    Elsi Pinto MA

## 2020-07-31 ENCOUNTER — VIRTUAL VISIT (OUTPATIENT)
Dept: FAMILY MEDICINE | Facility: CLINIC | Age: 55
End: 2020-07-31
Payer: COMMERCIAL

## 2020-07-31 DIAGNOSIS — E66.01 MORBID OBESITY (H): ICD-10-CM

## 2020-07-31 DIAGNOSIS — F43.21 ADJUSTMENT DISORDER WITH DEPRESSED MOOD: ICD-10-CM

## 2020-07-31 DIAGNOSIS — E78.5 HYPERLIPIDEMIA LDL GOAL <130: ICD-10-CM

## 2020-07-31 DIAGNOSIS — D05.12 DUCTAL CARCINOMA IN SITU (DCIS) OF LEFT BREAST: ICD-10-CM

## 2020-07-31 DIAGNOSIS — I10 HYPERTENSION GOAL BP (BLOOD PRESSURE) < 140/90: Primary | ICD-10-CM

## 2020-07-31 PROCEDURE — 99214 OFFICE O/P EST MOD 30 MIN: CPT | Mod: 95 | Performed by: PREVENTIVE MEDICINE

## 2020-07-31 RX ORDER — BUPROPION HYDROCHLORIDE 150 MG/1
TABLET ORAL
Qty: 90 TABLET | Refills: 1 | Status: SHIPPED | OUTPATIENT
Start: 2020-07-31 | End: 2021-02-12

## 2020-07-31 RX ORDER — LOSARTAN POTASSIUM AND HYDROCHLOROTHIAZIDE 12.5; 1 MG/1; MG/1
1 TABLET ORAL DAILY
Qty: 90 TABLET | Refills: 1 | Status: SHIPPED | OUTPATIENT
Start: 2020-07-31 | End: 2021-01-04

## 2020-07-31 NOTE — PROGRESS NOTES
"Rizwana Aguirre is a 54 year old female who is being evaluated via a billable video visit.      The patient has been notified of following:     \"This video visit will be conducted via a call between you and your physician/provider. We have found that certain health care needs can be provided without the need for an in-person physical exam.  This service lets us provide the care you need with a video conversation.  If a prescription is necessary we can send it directly to your pharmacy.  If lab work is needed we can place an order for that and you can then stop by our lab to have the test done at a later time.    Video visits are billed at different rates depending on your insurance coverage.  Please reach out to your insurance provider with any questions.    If during the course of the call the physician/provider feels a video visit is not appropriate, you will not be charged for this service.\"    Patient has given verbal consent for Video visit? Yes  How would you like to obtain your AVS? MyChart  If you are dropped from the video visit, the video invite should be resent to: Send to e-mail at: [email or web address]   Will anyone else be joining your video visit? No      Subjective     Rizwana Aguirre is a 54 year old female who presents today via video visit for the following health issues:    HPI    Hypertension Follow-up      Do you check your blood pressure regularly outside of the clinic? No     Are you following a low salt diet? Yes    Are your blood pressures ever more than 140 on the top number (systolic) OR more   than 90 on the bottom number (diastolic), for example 140/90? Does not monitor BP outside of clinic visits       How many servings of fruits and vegetables do you eat daily?  4 or more    On average, how many sweetened beverages do you drink each day (Examples: soda, juice, sweet tea, etc.  Do NOT count diet or artificially sweetened beverages)?  1-2 diet coke a week     How many days per week do you " exercise enough to make your heart beat faster? 3 or less    How many minutes a day do you exercise enough to make your heart beat faster? 9 or less    How many days per week do you miss taking your medication? 0    Has not been able to check blood pressure at home  134/84  107/82  7/29/2020 was high during Ancillary check 140/78  Has been doing less exercise  Has changed eating habits  Reduced processed food.  No chest pain  No severe headaches  No vision changes  No leg edema  No dizziness       Mood:  -OK  -sleep is good lat month  -No thoughts of self harm      The 10-year ASCVD risk score (Mod[place] SHOEMAKER Jr., et al., 2013) is: 3%    Values used to calculate the score:      Age: 54 years      Sex: Female      Is Non- : No      Diabetic: No      Tobacco smoker: No      Systolic Blood Pressure: 140 mmHg      Is BP treated: Yes      HDL Cholesterol: 58 mg/dL      Total Cholesterol: 236 mg/dL     Paternal grandfather with MI age 40s       Video Start Time: 0758 AM    Patient Active Problem List   Diagnosis     HTN (hypertension), benign     CARDIOVASCULAR SCREENING; LDL GOAL LESS THAN 160     Hypertension goal BP (blood pressure) < 140/90     Vitamin D deficiency     Pruritus     Hyperphoria     Lesion of external ear     Classic migraine with aura     Urinary incontinence, unspecified incontinence type     Functional urinary incontinence     Atopic dermatitis, unspecified type     Atypical ductal hyperplasia of left breast     Mild aortic insufficiency     Non-rheumatic mitral regurgitation     Ductal carcinoma in situ (DCIS) of left breast     Morbid obesity (H)     Adjustment disorder with depressed mood     Nuclear sclerosis of both eyes     Cervical high risk HPV (human papillomavirus) test positive     Past Surgical History:   Procedure Laterality Date     BIOPSY BREAST NEEDLE LOCALIZATION Left 11/14/2016    Procedure: BIOPSY BREAST NEEDLE LOCALIZATION;  Surgeon: Livia Frederick MD;   Location: [facility] OR     BREAST BIOPSY, CORE RT/LT Left 10/12/2016     C  DELIVERY ONLY  x2     COLONOSCOPY WITH CO2 INSUFFLATION N/A 2017    Procedure: COLONOSCOPY WITH CO2 INSUFFLATION;  BMI 36.11  Colon cancer screening  Referring Provider-Indira Aldrich  Pharmacy [facility]  Fax [phone]  ;  Surgeon: Duane, William Charles, MD;  Location: [facility] OR       Social History     Tobacco Use     Smoking status: Never Smoker     Smokeless tobacco: Never Used   Substance Use Topics     Alcohol use: Yes     Alcohol/week: 0.0 standard drinks     Comment: Social     Family History   Problem Relation Age of Onset     C.A.D. Mother      Hypertension Mother      C.A.D. Father      Cancer Father 40        Melanoma     Hypertension Father      Other Cancer Father         melanoma     Cerebrovascular Disease Maternal Grandmother          Current Outpatient Medications   Medication Sig Dispense Refill     azelastine (OPTIVAR) 0.05 % ophthalmic solution Place 1 drop into both eyes 2 times daily 1 Bottle 11     buPROPion (WELLBUTRIN XL) 150 MG 24 hr tablet TAKE 1 TABLET BY MOUTH EVERY MORNING 90 tablet 1     Coenzyme Q10 (COQ-10 PO) Take 2 tablets by mouth        losartan-hydrochlorothiazide (HYZAAR) 100-12.5 MG tablet Take 1 tablet by mouth daily 90 tablet 1     Multiple Vitamins-Minerals (MULTIVITAMIN ADULTS PO) Take by mouth daily       Omega-3 Fatty Acids (OMEGA-3 FISH OIL PO) Take 2 g by mouth daily        omeprazole (PRILOSEC) 40 MG DR capsule Take 1 capsule (40 mg) by mouth daily Take 30-60 minutes before a meal. 90 capsule 3     triamcinolone (KENALOG) 0.1 % cream Apply sparingly to affected area two times daily for 10-14 days. 30 g 1     Allergies   Allergen Reactions     Cantaloupe [Melon]      Scratchy throat     Shrimp Nausea     Walnuts [Nuts]      Scratchy throat     BP Readings from Last 3 Encounters:   20 (!) 140/78   19 (!) 146/90   11/15/19 (!) 142/81    Wt Readings from Last 3  Encounters:   12/23/19 103.1 kg (227 lb 6.4 oz)   11/15/19 100.7 kg (222 lb)   02/19/19 99.8 kg (220 lb)                    Reviewed and updated as needed this visit by Provider  Tobacco  Allergies  Meds  Problems  Med Hx  Surg Hx  Fam Hx         Review of Systems   Constitutional, HEENT, cardiovascular, pulmonary, gi and gu systems are negative, except as otherwise noted.      Objective    Vitals - Patient Reported  Systolic (Patient Reported): 134  Diastolic (Patient Reported): 84  Pain Score: No Pain (0)        Physical Exam     GENERAL: Healthy, alert and no distress  EYES: Eyes grossly normal to inspection.  No discharge or erythema, or obvious scleral/conjunctival abnormalities.  RESP: No audible wheeze, cough, or visible cyanosis.  No visible retractions or increased work of breathing.    SKIN: Visible skin clear. No significant rash, abnormal pigmentation or lesions.  NEURO: Cranial nerves grossly intact.  Mentation and speech appropriate for age.  PSYCH: Mentation appears normal, affect normal/bright, judgement and insight intact, normal speech and appearance well-groomed.      Diagnostic Test Results:    Orders Only on 07/29/2020   Component Date Value Ref Range Status     Cholesterol 07/29/2020 236* <200 mg/dL Final    Desirable:       <200 mg/dl     Triglycerides 07/29/2020 172* <150 mg/dL Final    Comment: Borderline high:  150-199 mg/dl  High:             200-499 mg/dl  Very high:       >499 mg/dl  Fasting specimen       HDL Cholesterol 07/29/2020 58  >49 mg/dL Final     LDL Cholesterol Calculated 07/29/2020 144* <100 mg/dL Final    Comment: Above desirable:  100-129 mg/dl  Borderline High:  130-159 mg/dL  High:             160-189 mg/dL  Very high:       >189 mg/dl       Non HDL Cholesterol 07/29/2020 178* <130 mg/dL Final    Comment: Above Desirable:  130-159 mg/dl  Borderline high:  160-189 mg/dl  High:             190-219 mg/dl  Very high:       >219 mg/dl         Labs reviewed in Epic  No  "results found for this or any previous visit (from the past 24 hour(s)).        Assessment & Plan     Rizwana was seen today for hypertension.    Diagnoses and all orders for this visit:    Hypertension goal BP (blood pressure) < 140/90  -     losartan-hydrochlorothiazide (HYZAAR) 100-12.5 MG tablet; Take 1 tablet by mouth daily  -     CBC with platelets; Future  -     Albumin Random Urine Quantitative with Creat Ratio; Future  -     Hemoglobin A1c; Future  -     Basic metabolic panel; Future  -continue current medication   -will be getting a blood pressure machine at home, will send My Chart message with readings. If above goal then increase hydrochlorothiazide component of Hyzaar from 12.5 mg to 25 mg daily     Hyperlipidemia LDL goal <130  -     Lipid panel reflex to direct LDL Non-fasting; Future  -     Hemoglobin A1c; Future  -lifestyle changes, recheck in 6 months, if LDL over 130 again then start statin  -The 10-year ASCVD risk score (Mod[place] SHOEMAKER Jr., et al., 2013) is: 3%    Values used to calculate the score:      Age: 54 years      Sex: Female      Is Non- : No      Diabetic: No      Tobacco smoker: No      Systolic Blood Pressure: 140 mmHg      Is BP treated: Yes      HDL Cholesterol: 58 mg/dL      Total Cholesterol: 236 mg/dL      Morbid obesity (H)  -     Hemoglobin A1c; Future  -has made changes in the diet, less processed foods and more veggies     Ductal carcinoma in situ (DCIS) of left breast  -Last mammogram 11/2019    Adjustment disorder with depressed mood  -     buPROPion (WELLBUTRIN XL) 150 MG 24 hr tablet; TAKE 1 TABLET BY MOUTH EVERY MORNING  -Mood stable, continue medication          BMI:   Estimated body mass index is 42.62 kg/m  as calculated from the following:    Height as of 12/23/19: 1.556 m (5' 1.25\").    Weight as of 12/23/19: 103.1 kg (227 lb 6.4 oz).   Weight management plan: Discussed healthy diet and exercise guidelines      Return in about 6 months (around " 1/31/2021) for Lab Work.    Kristine Marie MD MPH    [facility]      Video-Visit Details    Type of service:  Video Visit    Video End Time:0820 AM    Originating Location (pt. Location): Home    Distant Location (provider location):  [facility]     Platform used for Video Visit: AmWell    Return in about 6 months (around 1/31/2021) for Lab Work.       Kristine Marie MD MPH

## 2020-09-30 ENCOUNTER — MYC REFILL (OUTPATIENT)
Dept: FAMILY MEDICINE | Facility: CLINIC | Age: 55
End: 2020-09-30

## 2020-09-30 DIAGNOSIS — L20.9 ATOPIC DERMATITIS, UNSPECIFIED TYPE: ICD-10-CM

## 2020-10-02 RX ORDER — TRIAMCINOLONE ACETONIDE 1 MG/G
CREAM TOPICAL
Qty: 30 G | Refills: 1 | Status: SHIPPED | OUTPATIENT
Start: 2020-10-02 | End: 2022-03-18

## 2020-10-02 NOTE — TELEPHONE ENCOUNTER
Routing refill request to provider for review/approval because:  Rx from 8/2018, please advise  Shagufta Brady RN  [facility]

## 2020-10-19 ENCOUNTER — OFFICE VISIT (OUTPATIENT)
Dept: OPTOMETRY | Facility: CLINIC | Age: 55
End: 2020-10-19
Payer: COMMERCIAL

## 2020-10-19 DIAGNOSIS — H10.13 ALLERGIC CONJUNCTIVITIS OF BOTH EYES: Primary | ICD-10-CM

## 2020-10-19 PROCEDURE — 99213 OFFICE O/P EST LOW 20 MIN: CPT | Performed by: OPTOMETRIST

## 2020-10-19 RX ORDER — LOTEPREDNOL ETABONATE 5 MG/ML
SUSPENSION/ DROPS OPHTHALMIC
Qty: 1 BOTTLE | Refills: 0 | Status: SHIPPED | OUTPATIENT
Start: 2020-10-19 | End: 2020-11-23

## 2020-10-19 ASSESSMENT — EXTERNAL EXAM - LEFT EYE: OS_EXAM: NORMAL

## 2020-10-19 ASSESSMENT — VISUAL ACUITY
METHOD: SNELLEN - LINEAR
OD_CC: 20/25
OS_CC: 20/20
CORRECTION_TYPE: GLASSES

## 2020-10-19 ASSESSMENT — TONOMETRY
OD_IOP_MMHG: 13
IOP_METHOD: TONOPEN
OS_IOP_MMHG: 13

## 2020-10-19 ASSESSMENT — EXTERNAL EXAM - RIGHT EYE: OD_EXAM: NORMAL

## 2020-10-19 NOTE — LETTER
10/19/2020         RE: Rizwana Aguirre  [address] [facility] [address]        Dear Colleague,    Thank you for referring your patient, Rizwana Aguirre, to the [facility]. Please see a copy of my visit note below.    Chief Complaint   Patient presents with     Eye Itching Both Eyes      Eye Itching Both Eyes   HPI    Eye Itching Both Eyes      Laterality:  both eyes     Associated symptoms:  itching, dryness, tearing, and irritation     Frequency:  constantly     Duration:  1 month     Course:  gradually improving     Treatments tried:  allergy drops, oral allergy medications, warm compresses, and artificial tears     Response to treatment:  mild improvement         Medical, surgical and family histories reviewed and updated 10/19/2020.       OBJECTIVE: See Ophthalmology exam    ASSESSMENT:    ICD-10-CM    1. Allergic conjunctivitis of both eyes  H10.13 loteprednol (LOTEMAX) 0.5 % ophthalmic suspension     tobramycin-dexamethasone (TOBRADEX) 0.3-0.1 % ophthalmic ointment      PLAN:     Patient Instructions   Lotemax- 1 drop both eyes 4 x day for 1 week then 3 x day for 1 week.    Tobradex or Maxitrol ointment to eyelids 3 x day for 1 week.    Cool compresses.  Do not rub your eyes.    Non preserved artificial tears- 1 drop both eyes 4 x day.    Return for comprehensive eye exam or sooner if needed. 12/2020.    Joshua Rothman, GENET           Again, thank you for allowing me to participate in the care of your patient.        Sincerely,        Joshua Rothman, OD

## 2020-10-19 NOTE — PROGRESS NOTES
Chief Complaint   Patient presents with     Eye Itching Both Eyes      Eye Itching Both Eyes   HPI    Eye Itching Both Eyes      Laterality:  both eyes     Associated symptoms:  itching, dryness, tearing, and irritation     Frequency:  constantly     Duration:  1 month     Course:  gradually improving     Treatments tried:  allergy drops, oral allergy medications, warm compresses, and artificial tears     Response to treatment:  mild improvement         Medical, surgical and family histories reviewed and updated 10/19/2020.       OBJECTIVE: See Ophthalmology exam    ASSESSMENT:    ICD-10-CM    1. Allergic conjunctivitis of both eyes  H10.13 loteprednol (LOTEMAX) 0.5 % ophthalmic suspension     tobramycin-dexamethasone (TOBRADEX) 0.3-0.1 % ophthalmic ointment      PLAN:     Patient Instructions   Lotemax- 1 drop both eyes 4 x day for 1 week then 3 x day for 1 week.    Tobradex or Maxitrol ointment to eyelids 3 x day for 1 week.    Cool compresses.  Do not rub your eyes.    Non preserved artificial tears- 1 drop both eyes 4 x day.    Return for comprehensive eye exam or sooner if needed. 12/2020.    Joshua Rothman, GENET       
Patient expressed no known problems or needs

## 2020-10-19 NOTE — PATIENT INSTRUCTIONS
Lotemax- 1 drop both eyes 4 x day for 1 week then 3 x day for 1 week.    Tobradex or Maxitrol ointment to eyelids 3 x day for 1 week.    Cool compresses.  Do not rub your eyes.    Non preserved artificial tears- 1 drop both eyes 4 x day.    Return for comprehensive eye exam or sooner if needed. 12/2020.    Joshua Rothman, OD

## 2020-10-29 DIAGNOSIS — K21.9 LPRD (LARYNGOPHARYNGEAL REFLUX DISEASE): ICD-10-CM

## 2020-10-29 DIAGNOSIS — R49.9 CHANGE IN VOICE: ICD-10-CM

## 2020-10-30 RX ORDER — OMEPRAZOLE 40 MG/1
40 CAPSULE, DELAYED RELEASE ORAL DAILY
Qty: 90 CAPSULE | Refills: 2 | Status: SHIPPED | OUTPATIENT
Start: 2020-10-30 | End: 2021-07-28

## 2020-10-30 NOTE — TELEPHONE ENCOUNTER
Prescription approved per [facility] Refill Protocol.      Wanda Loyola RN, BSN, PHN

## 2020-11-09 DIAGNOSIS — Z12.31 VISIT FOR SCREENING MAMMOGRAM: ICD-10-CM

## 2020-12-12 ENCOUNTER — HEALTH MAINTENANCE LETTER (OUTPATIENT)
Age: 55
End: 2020-12-12

## 2020-12-18 ENCOUNTER — PATIENT OUTREACH (OUTPATIENT)
Dept: FAMILY MEDICINE | Facility: CLINIC | Age: 55
End: 2020-12-18

## 2020-12-18 DIAGNOSIS — R87.810 CERVICAL HIGH RISK HPV (HUMAN PAPILLOMAVIRUS) TEST POSITIVE: ICD-10-CM

## 2021-01-15 ENCOUNTER — HEALTH MAINTENANCE LETTER (OUTPATIENT)
Age: 56
End: 2021-01-15

## 2021-02-12 ENCOUNTER — OFFICE VISIT (OUTPATIENT)
Dept: FAMILY MEDICINE | Facility: CLINIC | Age: 56
End: 2021-02-12
Payer: COMMERCIAL

## 2021-02-12 VITALS
HEIGHT: 61 IN | RESPIRATION RATE: 16 BRPM | OXYGEN SATURATION: 95 % | SYSTOLIC BLOOD PRESSURE: 139 MMHG | DIASTOLIC BLOOD PRESSURE: 86 MMHG | WEIGHT: 228 LBS | BODY MASS INDEX: 43.05 KG/M2 | HEART RATE: 70 BPM | TEMPERATURE: 97.8 F

## 2021-02-12 DIAGNOSIS — Z12.4 SCREENING FOR CERVICAL CANCER: ICD-10-CM

## 2021-02-12 DIAGNOSIS — Z00.00 ROUTINE GENERAL MEDICAL EXAMINATION AT A HEALTH CARE FACILITY: Primary | ICD-10-CM

## 2021-02-12 DIAGNOSIS — E66.01 MORBID OBESITY (H): ICD-10-CM

## 2021-02-12 DIAGNOSIS — E78.5 HYPERLIPIDEMIA LDL GOAL <130: ICD-10-CM

## 2021-02-12 DIAGNOSIS — I10 HYPERTENSION GOAL BP (BLOOD PRESSURE) < 140/90: ICD-10-CM

## 2021-02-12 DIAGNOSIS — F43.21 ADJUSTMENT DISORDER WITH DEPRESSED MOOD: ICD-10-CM

## 2021-02-12 DIAGNOSIS — D05.12 DUCTAL CARCINOMA IN SITU (DCIS) OF LEFT BREAST: ICD-10-CM

## 2021-02-12 DIAGNOSIS — L98.9 SKIN LESION: ICD-10-CM

## 2021-02-12 DIAGNOSIS — R87.810 CERVICAL HIGH RISK HPV (HUMAN PAPILLOMAVIRUS) TEST POSITIVE: ICD-10-CM

## 2021-02-12 LAB
ANION GAP SERPL CALCULATED.3IONS-SCNC: 6 MMOL/L (ref 3–14)
BUN SERPL-MCNC: 17 MG/DL (ref 7–30)
CALCIUM SERPL-MCNC: 9.1 MG/DL (ref 8.5–10.1)
CHLORIDE SERPL-SCNC: 104 MMOL/L (ref 94–109)
CHOLEST SERPL-MCNC: 235 MG/DL
CO2 SERPL-SCNC: 29 MMOL/L (ref 20–32)
CREAT SERPL-MCNC: 0.83 MG/DL (ref 0.52–1.04)
CREAT UR-MCNC: 93 MG/DL
ERYTHROCYTE [DISTWIDTH] IN BLOOD BY AUTOMATED COUNT: 12.3 % (ref 10–15)
GFR SERPL CREATININE-BSD FRML MDRD: 80 ML/MIN/{1.73_M2}
GLUCOSE SERPL-MCNC: 104 MG/DL (ref 70–99)
HBA1C MFR BLD: 5.8 % (ref 0–5.6)
HCT VFR BLD AUTO: 41.1 % (ref 35–47)
HDLC SERPL-MCNC: 60 MG/DL
HGB BLD-MCNC: 13.6 G/DL (ref 11.7–15.7)
LDLC SERPL CALC-MCNC: 150 MG/DL
MCH RBC QN AUTO: 29.6 PG (ref 26.5–33)
MCHC RBC AUTO-ENTMCNC: 33.1 G/DL (ref 31.5–36.5)
MCV RBC AUTO: 90 FL (ref 78–100)
MICROALBUMIN UR-MCNC: 20 MG/L
MICROALBUMIN/CREAT UR: 22.11 MG/G CR (ref 0–25)
NONHDLC SERPL-MCNC: 175 MG/DL
PLATELET # BLD AUTO: 266 10E9/L (ref 150–450)
POTASSIUM SERPL-SCNC: 3.6 MMOL/L (ref 3.4–5.3)
RBC # BLD AUTO: 4.59 10E12/L (ref 3.8–5.2)
SODIUM SERPL-SCNC: 139 MMOL/L (ref 133–144)
TRIGL SERPL-MCNC: 127 MG/DL
WBC # BLD AUTO: 5.6 10E9/L (ref 4–11)

## 2021-02-12 PROCEDURE — 99396 PREV VISIT EST AGE 40-64: CPT | Mod: 25 | Performed by: PREVENTIVE MEDICINE

## 2021-02-12 PROCEDURE — 36415 COLL VENOUS BLD VENIPUNCTURE: CPT | Performed by: PREVENTIVE MEDICINE

## 2021-02-12 PROCEDURE — 80048 BASIC METABOLIC PNL TOTAL CA: CPT | Performed by: PREVENTIVE MEDICINE

## 2021-02-12 PROCEDURE — 85027 COMPLETE CBC AUTOMATED: CPT | Performed by: PREVENTIVE MEDICINE

## 2021-02-12 PROCEDURE — G0145 SCR C/V CYTO,THINLAYER,RESCR: HCPCS | Performed by: PREVENTIVE MEDICINE

## 2021-02-12 PROCEDURE — 87624 HPV HI-RISK TYP POOLED RSLT: CPT | Performed by: PREVENTIVE MEDICINE

## 2021-02-12 PROCEDURE — 90471 IMMUNIZATION ADMIN: CPT | Performed by: PREVENTIVE MEDICINE

## 2021-02-12 PROCEDURE — 82043 UR ALBUMIN QUANTITATIVE: CPT | Performed by: PREVENTIVE MEDICINE

## 2021-02-12 PROCEDURE — 83036 HEMOGLOBIN GLYCOSYLATED A1C: CPT | Performed by: PREVENTIVE MEDICINE

## 2021-02-12 PROCEDURE — 90682 RIV4 VACC RECOMBINANT DNA IM: CPT | Performed by: PREVENTIVE MEDICINE

## 2021-02-12 PROCEDURE — 99214 OFFICE O/P EST MOD 30 MIN: CPT | Mod: 25 | Performed by: PREVENTIVE MEDICINE

## 2021-02-12 PROCEDURE — 80061 LIPID PANEL: CPT | Performed by: PREVENTIVE MEDICINE

## 2021-02-12 RX ORDER — BUPROPION HYDROCHLORIDE 150 MG/1
TABLET ORAL
Qty: 90 TABLET | Refills: 1 | Status: SHIPPED | OUTPATIENT
Start: 2021-02-12 | End: 2021-08-09

## 2021-02-12 RX ORDER — LOSARTAN POTASSIUM AND HYDROCHLOROTHIAZIDE 25; 100 MG/1; MG/1
1 TABLET ORAL DAILY
Qty: 90 TABLET | Refills: 3 | Status: SHIPPED | OUTPATIENT
Start: 2021-02-12 | End: 2022-01-24

## 2021-02-12 ASSESSMENT — PATIENT HEALTH QUESTIONNAIRE - PHQ9: SUM OF ALL RESPONSES TO PHQ QUESTIONS 1-9: 1

## 2021-02-12 ASSESSMENT — MIFFLIN-ST. JEOR: SCORE: 1575.54

## 2021-02-12 ASSESSMENT — PAIN SCALES - GENERAL: PAINLEVEL: NO PAIN (0)

## 2021-02-12 NOTE — PATIENT INSTRUCTIONS
Preventive Health Recommendations  Female Ages 50 - 64    Yearly exam: See your health care provider every year in order to  o Review health changes.   o Discuss preventive care.    o Review your medicines if your doctor has prescribed any.      Get a Pap test every three years (unless you have an abnormal result and your provider advises testing more often).    If you get Pap tests with HPV test, you only need to test every 5 years, unless you have an abnormal result.     You do not need a Pap test if your uterus was removed (hysterectomy) and you have not had cancer.    You should be tested each year for STDs (sexually transmitted diseases) if you're at risk.     Have a mammogram every 1 to 2 years.    Have a colonoscopy at age 50, or have a yearly FIT test (stool test). These exams screen for colon cancer.      Have a cholesterol test every 5 years, or more often if advised.    Have a diabetes test (fasting glucose) every three years. If you are at risk for diabetes, you should have this test more often.     If you are at risk for osteoporosis (brittle bone disease), think about having a bone density scan (DEXA).    Shots: Get a flu shot each year. Get a tetanus shot every 10 years.    Nutrition:     Eat at least 5 servings of fruits and vegetables each day.    Eat whole-grain bread, whole-wheat pasta and brown rice instead of white grains and rice.    Get adequate Calcium and Vitamin D.     Lifestyle    Exercise at least 150 minutes a week (30 minutes a day, 5 days a week). This will help you control your weight and prevent disease.    Limit alcohol to one drink per day.    No smoking.     Wear sunscreen to prevent skin cancer.     See your dentist every six months for an exam and cleaning.    See your eye doctor every 1 to 2 years.    DERMATOLOGY REFERRAL:    Kristine Marie MD   [id number]   [facility] [address]   Phone: [phone]   Fax: [phone]    Diagnoses: Skin lesion   Order: Dermatology  Adult Referral    [facility]   [address] [address] [place] [address]   Phone: [phone]   Fax: [phone]      Comment: Please be aware that coverage of these services is subject to the terms and limitations of your health insurance plan.  Call member services at your health plan with any benefit or coverage questions.   [facility] will call you to coordinate your care as prescribed by the provider.  If you don t hear from a representative within 2 business days, please call the number listed above.

## 2021-02-12 NOTE — PROGRESS NOTES
SUBJECTIVE:   CC: iRzwana Aguirre is an 55 year old woman who presents for preventive health visit.     Patient has been advised of split billing requirements and indicates understanding: Yes  Healthy Habits:    Do you get at least three servings of calcium containing foods daily (dairy, green leafy vegetables, etc.)? yes    Amount of exercise or daily activities, outside of work: 0 day(s) per week    Problems taking medications regularly No    Medication side effects: No    Have you had an eye exam in the past two years? yes    Do you see a dentist twice per year? yes    Do you have sleep apnea, excessive snoring or daytime drowsiness?no      Hypertension Follow-up      Do you check your blood pressure regularly outside of the clinic? Yes     Are you following a low salt diet? Yes    Are your blood pressures ever more than 140 on the top number (systolic) OR more   than 90 on the bottom number (diastolic), for example 140/90? No   Readings are in the upper 130s systolic, we discussed increasing dose of medication     Would like to see DERM:  -spots on the face, right side for a year, no pain, no drainage   -mole on the right arm, starting to change some  -Father with skin cancer, no melanoma history     Mood symptoms:  -stable  -sleep is better  -No thoughts of self harm  -taking medication as prescribed  -no medication side effects     Today's PHQ-2 Score:   PHQ-2 ( 1999 Pfizer) 2/12/2021 7/31/2020   Q1: Little interest or pleasure in doing things 0 0   Q2: Feeling down, depressed or hopeless 0 0   PHQ-2 Score 0 0   Q1: Little interest or pleasure in doing things - -   Q2: Feeling down, depressed or hopeless - -   PHQ-2 Score - -       Abuse: Current or Past(Physical, Sexual or Emotional)- No  Do you feel safe in your environment? Yes        Social History     Tobacco Use     Smoking status: Never Smoker     Smokeless tobacco: Never Used   Substance Use Topics     Alcohol use: Yes     Alcohol/week: 0.0 standard  drinks     Comment: Social     If you drink alcohol do you typically have >3 drinks per day or >7 drinks per week? No                     Reviewed orders with patient.  Reviewed health maintenance and updated orders accordingly - Yes  Lab work is in process  Labs reviewed in EPIC  BP Readings from Last 3 Encounters:   21 139/86   20 (!) 140/78   19 (!) 146/90    Wt Readings from Last 3 Encounters:   21 103.4 kg (228 lb)   19 103.1 kg (227 lb 6.4 oz)   11/15/19 100.7 kg (222 lb)                  Patient Active Problem List   Diagnosis     HTN (hypertension), benign     CARDIOVASCULAR SCREENING; LDL GOAL LESS THAN 160     Hypertension goal BP (blood pressure) < 140/90     Vitamin D deficiency     Pruritus     Hyperphoria     Lesion of external ear     Classic migraine with aura     Urinary incontinence, unspecified incontinence type     Functional urinary incontinence     Atopic dermatitis, unspecified type     Atypical ductal hyperplasia of left breast     Mild aortic insufficiency     Non-rheumatic mitral regurgitation     Ductal carcinoma in situ (DCIS) of left breast     Morbid obesity (H)     Adjustment disorder with depressed mood     Nuclear sclerosis of both eyes     Cervical high risk HPV (human papillomavirus) test positive     Past Surgical History:   Procedure Laterality Date     BIOPSY BREAST NEEDLE LOCALIZATION Left 2016    Procedure: BIOPSY BREAST NEEDLE LOCALIZATION;  Surgeon: Livia Frederick MD;  Location: [facility]     BREAST BIOPSY, CORE RT/LT Left 10/12/2016     C  DELIVERY ONLY  x2     COLONOSCOPY WITH CO2 INSUFFLATION N/A 2017    Procedure: COLONOSCOPY WITH CO2 INSUFFLATION;  BMI 36.11  Colon cancer screening  Referring Provider-Indira Aldrich  Pharmacy [facility]  Fax [phone]  ;  Surgeon: Duane, William Charles, MD;  Location: [facility] OR       Social History     Tobacco Use     Smoking status: Never Smoker     Smokeless tobacco: Never  Used   Substance Use Topics     Alcohol use: Yes     Alcohol/week: 0.0 standard drinks     Comment: Social     Family History   Problem Relation Age of Onset     C.A.D. Mother      Hypertension Mother      C.A.D. Father      Cancer Father 40        Melanoma     Hypertension Father      Other Cancer Father         melanoma     Cerebrovascular Disease Maternal Grandmother          Current Outpatient Medications   Medication Sig Dispense Refill     azelastine (OPTIVAR) 0.05 % ophthalmic solution Place 1 drop into both eyes 2 times daily 1 Bottle 11     buPROPion (WELLBUTRIN XL) 150 MG 24 hr tablet TAKE 1 TABLET BY MOUTH EVERY MORNING 90 tablet 1     Coenzyme Q10 (COQ-10 PO) Take 2 tablets by mouth        losartan-hydrochlorothiazide (HYZAAR) 100-25 MG tablet Take 1 tablet by mouth daily 90 tablet 3     Multiple Vitamins-Minerals (MULTIVITAMIN ADULTS PO) Take by mouth daily       olopatadine (PAZEO) 0.7 % ophthalmic solution Apply 1 drop to eye daily as needed (for itchy eyes) 1 Bottle 11     Omega-3 Fatty Acids (OMEGA-3 FISH OIL PO) Take 2 g by mouth daily        omeprazole (PRILOSEC) 40 MG DR capsule TAKE 1 CAPSULE (40 MG) BY MOUTH DAILY TAKE 30-60 MINUTES BEFORE A MEAL. 90 capsule 2     tobramycin-dexamethasone (TOBRADEX) 0.3-0.1 % ophthalmic ointment Apply to eyelids- 3 x day for 1 week. 1 Tube 0     triamcinolone (KENALOG) 0.1 % external cream Apply sparingly to affected area two times daily for 10-14 days. 30 g 1     Allergies   Allergen Reactions     Cantaloupe [Melon]      Scratchy throat     Shrimp Nausea     Walnuts [Nuts]      Scratchy throat       Breast CA Risk Screening:  No flowsheet data found.  No flowsheet data found.    Mammogram Screening - Alternate mammogram schedule due to breast cancer history  Pertinent mammograms are reviewed under the imaging tab.    Pertinent mammograms are reviewed under the imaging tab.  History of abnormal Pap smear: YES - updated in Problem List and Health Maintenance  accordingly  PAP / HPV Latest Ref Rng & Units 11/15/2019 10/26/2018 2015   PAP - NIL NIL NIL   HPV 16 DNA NEG:Negative Negative Negative -   HPV 18 DNA NEG:Negative Negative Negative -   OTHER HR HPV NEG:Negative Positive(A) Positive(A) -     Reviewed and updated as needed this visit by clinical staff  Tobacco  Allergies  Meds  Problems  Med Hx  Surg Hx  Fam Hx  Soc Hx          Reviewed and updated as needed this visit by Provider  Tobacco  Allergies  Meds  Problems  Med Hx  Surg Hx  Fam Hx         Past Medical History:   Diagnosis Date     Aortic insufficiency 10/16/09    Trace - per ECHO     Cervical high risk HPV (human papillomavirus) test positive 10/26/2018, 11/15/19    See problem list     DCIS (ductal carcinoma in situ) 2016    Left breast     Heart murmur      HTN (hypertension), benign      Hyperphoria 2013     Kidney stones      Migraines      Mitral insufficiency 10/16/09    Mild - per ECHO     Motion sickness      Seasonal allergic rhinitis      Uncomplicated asthma       Past Surgical History:   Procedure Laterality Date     BIOPSY BREAST NEEDLE LOCALIZATION Left 2016    Procedure: BIOPSY BREAST NEEDLE LOCALIZATION;  Surgeon: Livia Frederick MD;  Location: [facility]     BREAST BIOPSY, CORE RT/LT Left 10/12/2016     C  DELIVERY ONLY  x2     COLONOSCOPY WITH CO2 INSUFFLATION N/A 2017    Procedure: COLONOSCOPY WITH CO2 INSUFFLATION;  BMI 36.11  Colon cancer screening  Referring Provider-Indira Aldrich  Pharmacy [facility]  Fax [phone]  ;  Surgeon: Duane, William Charles, MD;  Location: [facility] OR       ROS:  CONSTITUTIONAL: NEGATIVE for fever, chills, change in weight  EYES: NEGATIVE for vision changes or irritation  ENT: NEGATIVE for ear, mouth and throat problems  RESP: NEGATIVE for significant cough or SOB  BREAST: NEGATIVE for masses, tenderness or discharge  CV: NEGATIVE for chest pain, palpitations or peripheral edema  GI: NEGATIVE for  "nausea, abdominal pain, heartburn, or change in bowel habits  : NEGATIVE for unusual urinary or vaginal symptoms. No vaginal bleeding.  MUSCULOSKELETAL: NEGATIVE for significant arthralgias or myalgia  NEURO: NEGATIVE for weakness, dizziness or paresthesias  HEME/ALLERGY/IMMUNE: NEGATIVE for bleeding problems  PSYCHIATRIC: NEGATIVE for changes in mood or affect     OBJECTIVE:   /86 (BP Location: Left arm, Patient Position: Sitting, Cuff Size: Adult Large)   Pulse 70   Temp 97.8  F (36.6  C) (Tympanic)   Resp 16   Ht 1.556 m (5' 1.25\")   Wt 103.4 kg (228 lb)   LMP 04/12/2017   SpO2 95%   BMI 42.73 kg/m    EXAM:  GENERAL APPEARANCE: healthy, alert and no distress  EYES: Eyes grossly normal to inspection, PERRL and conjunctivae and sclerae normal  HENT: ear canals and TM's normal  NECK: no adenopathy, no asymmetry  RESP: lungs clear to auscultation - no rales, rhonchi or wheezes  CV: regular rate and rhythm, normal S1 S2, no S3 or S4, no murmur, click or rub, no peripheral edema and peripheral pulses strong  ABDOMEN: soft, nontender, no rebound or guarding   MS: no musculoskeletal defects are noted and gait is age appropriate without ataxia  SKIN: skin colored papules inferior to the right eye, no erythema, no drainage, no blistering   NEURO: Normal strength and tone, sensory exam grossly normal, mentation intact and speech normal  PSYCH: mentation appears normal and affect normal/bright    Diagnostic Test Results:  Labs reviewed in Epic  No results found for this or any previous visit (from the past 24 hour(s)).    ASSESSMENT/PLAN:   Rizwana was seen today for physical.    Diagnoses and all orders for this visit:    Routine general medical examination at a health care facility  -colonoscopy up to date    Morbid obesity (H)  -     Hemoglobin A1c    Cervical high risk HPV (human papillomavirus) test positive  -     Pap imaged thin layer screen with HPV - recommended age 30 - 65 years (select HPV order " below)  -     HPV High Risk Types DNA Cervical    Screening for cervical cancer  -     Pap imaged thin layer screen with HPV - recommended age 30 - 65 years (select HPV order below)  -     HPV High Risk Types DNA Cervical    Hypertension goal BP (blood pressure) < 140/90  -     losartan-hydrochlorothiazide (HYZAAR) 100-25 MG tablet; Take 1 tablet by mouth daily  -     Basic metabolic panel  -     Hemoglobin A1c  -     Albumin Random Urine Quantitative with Creat Ratio  -     CBC with platelets  -readings in the upper 130s systolic at home, increased Losartan hydrochlorothiazide from 100-12.5 mg to 11-25 mg daily     Hyperlipidemia LDL goal <130  -     Hemoglobin A1c  -     Lipid panel reflex to direct LDL Non-fasting    The 10-year ASCVD risk score ([place] SHOEMAKER JrJessica, et al., 2013) is: 3.3%    Values used to calculate the score:      Age: 55 years      Sex: Female      Is Non- : No      Diabetic: No      Tobacco smoker: No      Systolic Blood Pressure: 139 mmHg      Is BP treated: Yes      HDL Cholesterol: 58 mg/dL      Total Cholesterol: 236 mg/dL    Ductal carcinoma in situ (DCIS) of left breast  -mammogram done 11.2020  -annual mammography    Adjustment disorder with depressed mood  -     buPROPion (WELLBUTRIN XL) 150 MG 24 hr tablet; TAKE 1 TABLET BY MOUTH EVERY MORNING  -Refills on medication provided     Skin lesion  -     DERMATOLOGY ADULT REFERRAL; Future    Other orders  -     C RIV4 (FLUBLOK) VACCINE RECOMBINANT DNA PRSRV ANTIBIO FREE, IM [[id number]]        Patient has been advised of split billing requirements and indicates understanding: Yes  COUNSELING:   Reviewed preventive health counseling, as reflected in patient instructions       Regular exercise       Healthy diet/nutrition       Immunizations    Vaccinated for: Influenza             Colon cancer screening    Estimated body mass index is 42.73 kg/m  as calculated from the following:    Height as of this encounter: 1.556 m (5'  "1.25\").    Weight as of this encounter: 103.4 kg (228 lb).    Weight management plan: Discussed healthy diet and exercise guidelines    She reports that she has never smoked. She has never used smokeless tobacco.      Counseling Resources:  ATP IV Guidelines  Pooled Cohorts Equation Calculator  Breast Cancer Risk Calculator  BRCA-Related Cancer Risk Assessment: FHS-7 Tool  FRAX Risk Assessment  ICSI Preventive Guidelines  Dietary Guidelines for Americans, 2010  USDA's MyPlate  ASA Prophylaxis  Lung CA Screening    Kristine Marie MD MPH    [facility]  "

## 2021-02-16 ENCOUNTER — MYC MEDICAL ADVICE (OUTPATIENT)
Dept: FAMILY MEDICINE | Facility: CLINIC | Age: 56
End: 2021-02-16

## 2021-02-16 DIAGNOSIS — E78.5 HYPERLIPIDEMIA LDL GOAL <130: Primary | ICD-10-CM

## 2021-02-16 PROBLEM — R73.01 IMPAIRED FASTING GLUCOSE: Status: ACTIVE | Noted: 2021-02-16

## 2021-02-16 LAB
COPATH REPORT: NORMAL
PAP: NORMAL

## 2021-02-16 RX ORDER — ATORVASTATIN CALCIUM 20 MG/1
20 TABLET, FILM COATED ORAL DAILY
Qty: 90 TABLET | Refills: 3 | Status: SHIPPED | OUTPATIENT
Start: 2021-02-16 | End: 2022-02-07

## 2021-02-16 NOTE — RESULT ENCOUNTER NOTE
Dear Rizwana Aguirre    Here are your cholesterol results:    Your LDL is: Lab Results       Component                Value               Date                       LDL                      150                 02/12/2021          Your LDL goal is to be less than 130  Your HDL is: Lab Results       Component                Value               Date                       HDL                      60                  02/12/2021           Goal HDL is Greater than 40 (for men) or 50 (for women).  Your Triglycerides are: Lab Results       Component                Value               Date                       TRIG                     127                 02/12/2021          Goal TRIGLYCERIDES are less than 150.       I would recommend starting a cholesterol medication called a statin. If you would like to proceed, please let me know so I can send it in to your pharmacy.    Three month glucose number is in a pre diabetes range, this means that you do not have diabetes now but are at an increased risk of developing diabetes in the future.     Here are some ways to improve your glucose without medication:    Try to get at least 45 minutes of aerobic exercise 5-6 days a week  Maintain a healthy body weight  Eat less saturated fats  Buy lean cuts of meat, reduce your portions of red meat or substitute poultry or fish  Avoid fried or fast foods that are high in fat  Eat more fruits and vegetables    Urine sample did not show any abnormal protein.   Electrolytes and kidney function are normal.  Basic blood count is not showing anemia or infection.     Follow up in 6 months, OK to do a Video visit.    Please do not hesitate to call us at [phone] if you have any questions or concerns.    Thank you,    Kristine Marie MD MPH

## 2021-02-18 LAB
FINAL DIAGNOSIS: NORMAL
HPV HR 12 DNA CVX QL NAA+PROBE: NEGATIVE
HPV16 DNA SPEC QL NAA+PROBE: NEGATIVE
HPV18 DNA SPEC QL NAA+PROBE: NEGATIVE
SPECIMEN DESCRIPTION: NORMAL
SPECIMEN SOURCE CVX/VAG CYTO: NORMAL

## 2021-02-19 ENCOUNTER — OFFICE VISIT (OUTPATIENT)
Dept: OPTOMETRY | Facility: CLINIC | Age: 56
End: 2021-02-19
Payer: COMMERCIAL

## 2021-02-19 DIAGNOSIS — H52.13 MYOPIA OF BOTH EYES: ICD-10-CM

## 2021-02-19 DIAGNOSIS — H25.13 NUCLEAR SCLEROSIS OF BOTH EYES: ICD-10-CM

## 2021-02-19 DIAGNOSIS — H52.4 PRESBYOPIA: ICD-10-CM

## 2021-02-19 DIAGNOSIS — H10.13 ALLERGIC CONJUNCTIVITIS OF BOTH EYES: ICD-10-CM

## 2021-02-19 DIAGNOSIS — Z01.00 EXAMINATION OF EYES AND VISION: Primary | ICD-10-CM

## 2021-02-19 DIAGNOSIS — H02.889 MEIBOMIAN GLAND DYSFUNCTION: ICD-10-CM

## 2021-02-19 PROCEDURE — 92014 COMPRE OPH EXAM EST PT 1/>: CPT | Performed by: OPTOMETRIST

## 2021-02-19 PROCEDURE — 92015 DETERMINE REFRACTIVE STATE: CPT | Performed by: OPTOMETRIST

## 2021-02-19 PROCEDURE — 92310 CONTACT LENS FITTING OU: CPT | Mod: GA | Performed by: OPTOMETRIST

## 2021-02-19 RX ORDER — LOTEPREDNOL ETABONATE 5 MG/ML
SUSPENSION/ DROPS OPHTHALMIC
Qty: 2.5 ML | Refills: 0 | Status: SHIPPED | OUTPATIENT
Start: 2021-02-19 | End: 2023-02-03

## 2021-02-19 ASSESSMENT — REFRACTION_MANIFEST
OS_ADD: +2.50
OD_ADD: +2.50
OS_SPHERE: -6.00
OS_CYLINDER: +0.50
OD_AXIS: 132
OD_CYLINDER: +0.25
OS_AXIS: 135
OD_SPHERE: -6.00

## 2021-02-19 ASSESSMENT — REFRACTION_CURRENTRX
OS_BRAND: ALCON DAILIES TOTAL 1 BC 8.5, D 14.1
OD_BRAND: ALCON DAILIES TOTAL 1 BC 8.5, D 14.1
OS_SPHERE: -5.25
OD_SPHERE: -3.75

## 2021-02-19 ASSESSMENT — CONF VISUAL FIELD
OS_NORMAL: 1
OD_NORMAL: 1

## 2021-02-19 ASSESSMENT — TONOMETRY
OD_IOP_MMHG: 16
OS_IOP_MMHG: 16
IOP_METHOD: TONOPEN

## 2021-02-19 ASSESSMENT — SLIT LAMP EXAM - LIDS
COMMENTS: MEIBOMIAN GLAND DYSFUNCTION
COMMENTS: MEIBOMIAN GLAND DYSFUNCTION

## 2021-02-19 ASSESSMENT — REFRACTION_WEARINGRX
OS_VBASE: DOWN
SPECS_TYPE: PAL
OS_CYLINDER: +0.50
OS_SPHERE: -5.75
OS_SPHERE: -5.75
OS_VPRISM: 1.0
OD_VBASE: UP
OS_ADD: +2.50
OS_ADD: +2.50
OS_VPRISM: 1.0
OD_SPHERE: -5.75
OD_CYLINDER: SPHERE
OS_AXIS: 135
OD_VPRISM: 1.0
OD_ADD: +2.50
OD_VPRISM: 1.0
OD_ADD: +2.50
OD_SPHERE: -5.75
OS_VBASE: DOWN
OD_VBASE: UP
OS_AXIS: 135
OS_CYLINDER: +0.50

## 2021-02-19 ASSESSMENT — CUP TO DISC RATIO
OS_RATIO: 0.2
OD_RATIO: 0.2

## 2021-02-19 ASSESSMENT — VISUAL ACUITY
METHOD: SNELLEN - LINEAR
OS_CC: 20/40
OD_SC: 20/400-
OD_CC: 20/20
CORRECTION_TYPE: GLASSES
OS_SC: 20/400-
OD_CC: 20/40
OS_CC: 20/20

## 2021-02-19 ASSESSMENT — REFRACTION_FINALRX
OS_VPRISM: 1.0
OD_VPRISM: 1.0

## 2021-02-19 ASSESSMENT — EXTERNAL EXAM - RIGHT EYE: OD_EXAM: NORMAL

## 2021-02-19 ASSESSMENT — EXTERNAL EXAM - LEFT EYE: OS_EXAM: NORMAL

## 2021-02-19 NOTE — PROGRESS NOTES
Chief Complaint   Patient presents with     Annual Eye Exam     More contact fit fee ok $75      Accompanied by self  Last Eye Exam: 12-  Dilated Previously: Yes    What are you currently using to see?  glasses and contacts       Distance Vision Acuity: Noticed gradual change in both eyes    Near Vision Acuity: Satisfied with vision while reading  with glasses or contacts    Eye Comfort: watery  Do you use eye drops? : Yes: optivar needs refill, lotemax only when needed,pazeo unable to fill at pharmacy   Occupation or Hobbies: manager     Thinks her allergies are due to her dog which she is around all the time now.    Sabi Ramirez Optometric Assistant, A.B.O.C.          Medical, surgical and family histories reviewed and updated 2/19/2021.       OBJECTIVE: See Ophthalmology exam    ASSESSMENT:    ICD-10-CM    1. Examination of eyes and vision  Z01.00 EYE EXAM (SIMPLE-NONBILLABLE)   2. Myopia of both eyes  H52.13 REFRACTION     CONTACT LENS FITTING,BILAT w/ signed waiver   3. Presbyopia  H52.4 REFRACTION     CONTACT LENS FITTING,BILAT w/ signed waiver   4. Allergic conjunctivitis of both eyes  H10.13 EYE EXAM (SIMPLE-NONBILLABLE)     loteprednol (LOTEMAX) 0.5 % ophthalmic suspension     tobramycin-dexamethasone (TOBRADEX) 0.3-0.1 % ophthalmic ointment   5. Meibomian gland dysfunction  H02.889 EYE EXAM (SIMPLE-NONBILLABLE)   6. Nuclear sclerosis of both eyes  H25.13 EYE EXAM (SIMPLE-NONBILLABLE)      PLAN:     Patient Instructions   Eyeglass prescription given.    Contact lens prescription given and form signed.    OTC Pataday Extra Strength  to be used once daily.    Cold compresses- do not rub your eyes.    Tobradex ointment to eyelids sparingly as needed.  Steroids can cause thinning of the skin and an increase in intraocular pressure.    Lotemax-1 drop both eyes 2-4 x day as needed for flare ups.  If you are using for over 1 week then schedule an appointment to see me for a glaucoma pressure  check.    Ocusoft lid scrubs- allergy formula with tea tree oil at night or [employer]Apps tea tree oil foam or oil cleanser.     Non preserved Systane or Refresh- 1 drop both eyes 2-4 x day. Ok to use with contact lenses.    You have the start of mild cataracts.  You may notice some blurred vision or glare with night driving.  It is important that you wear good sunglasses to protect your eyes from the ultraviolet light from the sun.  I recommend that you return in 1 year for an eye exam unless there are any sudden changes in your vision.       Return in 1 year for a complete eye exam or sooner if needed.    Joshua Rothman, OD

## 2021-02-19 NOTE — PATIENT INSTRUCTIONS
Eyeglass prescription given.    Contact lens prescription given and form signed.    OTC Pataday Extra Strength  to be used once daily.    Cold compresses- do not rub your eyes.    Tobradex ointment to eyelids sparingly as needed.  Steroids can cause thinning of the skin and an increase in intraocular pressure.    Lotemax-1 drop both eyes 2-4 x day as needed for flare ups.  If you are using for over 1 week then schedule an appointment to see me for a glaucoma pressure check.    Ocusoft lid scrubs- allergy formula with tea tree oil at night or [employer] tea tree oil foam or oil cleanser.     Non preserved Systane or Refresh- 1 drop both eyes 2-4 x day. Ok to use with contact lenses.    You have the start of mild cataracts.  You may notice some blurred vision or glare with night driving.  It is important that you wear good sunglasses to protect your eyes from the ultraviolet light from the sun.  I recommend that you return in 1 year for an eye exam unless there are any sudden changes in your vision.       Return in 1 year for a complete eye exam or sooner if needed.    Joshua Rothman, OD    The affects of the dilating drops last for 4- 6 hours.  You will be more sensitive to light and vision will be blurry up close.  Do not drive if you do not feel comfortable.  Mydriatic sunglasses were given if needed.    There is a combination of three treatments which can greatly improve symptoms of dry eyes.    1.  Artificial tears  2. Heat (eyes closed)  3. Eyelid and eyelash cleansing (eyes closed)     Use one drop of artificial tears both eyes 4 x daily.  Once in the morning, lunch, dinner and bedtime. Continue to use the drops regardless if your eyes are comfortable or not.  Artificial tears work best as a preventative and not as well after your eyes are starting to bother you.  It may take 4- 6 weeks of using the drops before you notice improvement.  If after that time you are still having problems schedule an appointment  for an evaluation and discussion of different treatments such as Restasis or Xiidra.  Dry eyes are a chronic condition and you may have more symptoms at certain times of the year.    Excess tearing can be due to the right tears not working properly or a blockage in the tear drainage system.  You can try using artificial tears 1 drop right eye 4 x day.  If the excess tearing is bothersome after 4-6 weeks of treatment then we can send you for further testing.  This would entail a referral to our oculoplastic specialist Dr. Yenny Zuñiga at the [facility]-[phone].    Recommended brands are:    Systane Complete  Systane Ultra  Systane Balance  Refresh Advanced Optive  Refresh Relieva  Blink    Recommended brands for contact lens wearers are:    Systane contacts  Refresh contacts  Blink contacts    If you are using drops more than 4 x day or have sensitivities to preservatives I recommend non preserved artificial tears.  These come in 1 use vials.  They can be used every 1-2 hours.  Do not reuse the vials.    Recommended brands are:    Refresh Optive Sonny-3  Systane- preservative free  Refresh-  preservative free  Blink- preservative free    Gels or ointment can be used at night.    Recommended brands are:    Systane Gel  Refresh Gel  Blink Gel  Genteal Gel    Systane night time (ointment)  Refresh Celluvisc  Refresh PM (ointment)      Visine, Clear Eyes or Murine (drops that get the red out) can irritate the eyes and cause a rebound effect where the eyes become more red and you end up using more drops.  Avoid drops containing tetrahydrozoline, naphazoline, phenylephrine, oxymetazoline.      OTC Lumify is a newer product that gives immediate redness relief without the rebound effect.  Use as needed to take the redness out.    Artificial tears may be used with other drops (such as allergy, glaucoma, antibiotics) around the same time.  Be sure to wait 5 minutes in between drops.    Heat to the eyelids can also  improve your symptoms of dry eyes.  Ramses heat masks can be purchased at Amazon to be used nightly for 10-15 minutes.  Other options are gel masks that can be put in the microwave and purchased at most pharmacies.      Tea Tree Oil eyelid cleansers recommended are Ocusoft Oust foam cleanser to cleanse eyelids/lashes at night and in the am. Other options are Blephadex or Cliradex eyelid wipes.  KEEP EYES CLOSED when using these products.  These can be purchased on amazon.com   A good product for make up remover with tea tree oil is WeLoveEyes.  This can be found at www.[employer] or [employer].    Other good eyelid cleansers have hypochlorous acid which removes excess bacteria and is safe around the eyes. Products are Avenova, Ocusoft Hypochlor or Heyedrate. Spray solution onto cotton pad, close eyes and gently apply to eyelids and eyelashes using side to side motion.  You can also KEEP EYES CLOSED spray and rub into eyelashes.  You do not need to rinse it off. Use morning and evening. These products can be found on Amazon.  You can check with your local pharmacy and see if they can order if for you if they don't have it.    Other brands of eyelid cleansing wipes are:    Ocusoft wipes  Systane wipes    A great eye make up line is [email or web address]      Optometry Providers       Clinic Locations                                 Telephone Number   Dr. Qiana [place]  [place] [phone]     [facility] Hours:                [facility] Hours:       [facility] Hours:   [address]   [address]     [address] [place] [address]   Francesca Hopkins [place] [address]    [place] [address]  Phone: [phone]                    Phone: [phone]     Phone: [phone]                      Monday 8:00-7:00                          Monday 8:00-7:00                          Monday 8:00-7:00               Tuesday 8:00-6:00                          Tuesday 8:00-7:00                          Tuesday 8:00-7:00              Wednesday 8:00-6:00                  Wednesday 8:00-7:00                   Wednesday 8:00-7:00      Thursday 8:00-6:00                        Thursday 8:00-7:00                         Thursday 8:00-7:00            Friday 8:00-5:00                              Friday 8:00-5:00                              Friday 8:00-5:00    [facility] Hours:   [address] [place] [address]  [phone]    Monday 8:00-7:00  Tuesday 8:00-7:00  Wednesday 8:00-7:00  Thursday 8:00-7:00  Friday 8:00-5:00  Please log on to [email or web address] to order your contact lenses.  The link is found on the [facility] page.  As always, Thank you for trusting us with your health care needs!

## 2021-02-19 NOTE — LETTER
2/19/2021         RE: Rizwana Aguirre  [id number]        Dear Colleague,    Thank you for referring your patient, Rizwana Aguirre, to the [facility]. Please see a copy of my visit note below.    Chief Complaint   Patient presents with     Annual Eye Exam     More contact fit fee ok $75      Accompanied by self  Last Eye Exam: 12-  Dilated Previously: Yes    What are you currently using to see?  glasses and contacts       Distance Vision Acuity: Noticed gradual change in both eyes    Near Vision Acuity: Satisfied with vision while reading  with glasses or contacts    Eye Comfort: watery  Do you use eye drops? : Yes: optivar needs refill, lotemax only when needed,pazeo unable to fill at pharmacy   Occupation or Hobbies: manager     Thinks her allergies are due to her dog which she is around all the time now.    Sabi Ramirez Optometric Assistant, A.B.O.C.          Medical, surgical and family histories reviewed and updated 2/19/2021.       OBJECTIVE: See Ophthalmology exam    ASSESSMENT:    ICD-10-CM    1. Examination of eyes and vision  Z01.00 EYE EXAM (SIMPLE-NONBILLABLE)   2. Myopia of both eyes  H52.13 REFRACTION     CONTACT LENS FITTING,BILAT w/ signed waiver   3. Presbyopia  H52.4 REFRACTION     CONTACT LENS FITTING,BILAT w/ signed waiver   4. Allergic conjunctivitis of both eyes  H10.13 EYE EXAM (SIMPLE-NONBILLABLE)     loteprednol (LOTEMAX) 0.5 % ophthalmic suspension     tobramycin-dexamethasone (TOBRADEX) 0.3-0.1 % ophthalmic ointment   5. Meibomian gland dysfunction  H02.889 EYE EXAM (SIMPLE-NONBILLABLE)   6. Nuclear sclerosis of both eyes  H25.13 EYE EXAM (SIMPLE-NONBILLABLE)      PLAN:     Patient Instructions   Eyeglass prescription given.    Contact lens prescription given and form signed.    OTC Pataday Extra Strength  to be used once daily.    Cold compresses- do not rub your eyes.    Tobradex ointment to eyelids sparingly as needed.   Steroids can cause thinning of the skin and an increase in intraocular pressure.    Lotemax-1 drop both eyes 2-4 x day as needed for flare ups.  If you are using for over 1 week then schedule an appointment to see me for a glaucoma pressure check.    Ocusoft lid scrubs- allergy formula with tea tree oil at night or Invodo tea tree oil foam or oil cleanser.     Non preserved Systane or Refresh- 1 drop both eyes 2-4 x day. Ok to use with contact lenses.    You have the start of mild cataracts.  You may notice some blurred vision or glare with night driving.  It is important that you wear good sunglasses to protect your eyes from the ultraviolet light from the sun.  I recommend that you return in 1 year for an eye exam unless there are any sudden changes in your vision.       Return in 1 year for a complete eye exam or sooner if needed.    Joshua Rothman, OD               Again, thank you for allowing me to participate in the care of your patient.        Sincerely,        Joshua Rothman, OD

## 2021-04-06 ENCOUNTER — IMMUNIZATION (OUTPATIENT)
Dept: PEDIATRICS | Facility: CLINIC | Age: 56
End: 2021-04-06
Payer: COMMERCIAL

## 2021-04-06 PROCEDURE — 91300 PR COVID VAC PFIZER DIL RECON 30 MCG/0.3 ML IM: CPT

## 2021-04-06 PROCEDURE — 0001A PR COVID VAC PFIZER DIL RECON 30 MCG/0.3 ML IM: CPT

## 2021-04-27 ENCOUNTER — IMMUNIZATION (OUTPATIENT)
Dept: PEDIATRICS | Facility: CLINIC | Age: 56
End: 2021-04-27
Attending: INTERNAL MEDICINE
Payer: COMMERCIAL

## 2021-04-27 PROCEDURE — 0002A PR COVID VAC PFIZER DIL RECON 30 MCG/0.3 ML IM: CPT

## 2021-04-27 PROCEDURE — 91300 PR COVID VAC PFIZER DIL RECON 30 MCG/0.3 ML IM: CPT

## 2021-04-30 ENCOUNTER — OFFICE VISIT (OUTPATIENT)
Dept: DERMATOLOGY | Facility: CLINIC | Age: 56
End: 2021-04-30
Payer: COMMERCIAL

## 2021-04-30 DIAGNOSIS — D48.5 NEOPLASM OF UNCERTAIN BEHAVIOR OF SKIN: ICD-10-CM

## 2021-04-30 DIAGNOSIS — L82.1 SK (SEBORRHEIC KERATOSIS): Primary | ICD-10-CM

## 2021-04-30 PROCEDURE — 17110 DESTRUCTION B9 LES UP TO 14: CPT | Performed by: DERMATOLOGY

## 2021-04-30 PROCEDURE — 11102 TANGNTL BX SKIN SINGLE LES: CPT | Mod: XS | Performed by: DERMATOLOGY

## 2021-04-30 PROCEDURE — 88305 TISSUE EXAM BY PATHOLOGIST: CPT | Performed by: PATHOLOGY

## 2021-04-30 PROCEDURE — 99213 OFFICE O/P EST LOW 20 MIN: CPT | Mod: 25 | Performed by: DERMATOLOGY

## 2021-04-30 NOTE — PATIENT INSTRUCTIONS
Cryotherapy    What is it?    Use of a very cold liquid, such as liquid nitrogen, to freeze and destroy abnormal skin cells that need to be removed    What should I expect?    Tenderness and redness    A small blister that might grow and fill with dark purple blood. There may be crusting.    More than one treatment may be needed if the lesions do not go away.    How do I care for the treated area?    Gently wash the area with your hands when bathing.    Use a thin layer of Vaseline to help with healing. You may use a Band-Aid.     The area should heal within 7-10 days and may leave behind a pink or lighter color.     Do not use an antibiotic or Neosporin ointment.     You may take acetaminophen (Tylenol) for pain.     Call your Doctor if you have:    Severe pain    Signs of infection (warmth, redness, cloudy yellow drainage, and or a bad smell)    Questions or concerns    Who should I call with questions?       [facility]: [phone]       [facility]: [phone]       For urgent needs outside of business hours call the [facility] at [phone]        and ask for the dermatology resident on call      Wound Care After a Biopsy    What is a skin biopsy?  A skin biopsy allows the doctor to examine a very small piece of tissue under the microscope to determine the diagnosis and the best treatment for the skin condition. A local anesthetic (numbing medicine)  is injected with a very small needle into the skin area to be tested. A small piece of skin is taken from the area. Sometimes a suture (stitch) is used.     What are the risks of a skin biopsy?  I will experience scar, bleeding, swelling, pain, crusting and redness. I may experience incomplete removal or recurrence. Risks of this procedure are excessive bleeding, bruising, infection, nerve damage, numbness, thick (hypertrophic or keloidal) scar and non-diagnostic biopsy.    How should I care  for my wound for the first 24 hours?    Keep the wound dry and covered for 24 hours    If it bleeds, hold direct pressure on the area for 15 minutes. If bleeding does not stop then go to the emergency room    Avoid strenuous exercise the first 1-2 days or as your doctor instructs you    How should I care for the wound after 24 hours?    After 24 hours, remove the bandage    You may bathe or shower as normal    If you had a scalp biopsy, you can shampoo as usual and can use shower water to clean the biopsy site daily    Clean the wound twice a day with gentle soap and water    Do not scrub, be gentle    Apply white petroleum/Vaseline after cleaning the wound with a cotton swab or a clean finger, and keep the site covered with a Bandaid /bandage. Bandages are not necessary with a scalp biopsy    If you are unable to cover the site with a Bandaid /bandage, re-apply ointment 2-3 times a day to keep the site moist. Moisture will help with healing    Avoid strenuous activity for first 1-2 days    Avoid lakes, rivers, pools, and oceans until the stitches are removed or the site is healed    How do I clean my wound?    Wash hands thoroughly with soap or use hand  before all wound care    Clean the wound with gentle soap and water    Apply white petroleum/Vaseline  to wound after it is clean    Replace the Bandaid /bandage to keep the wound covered for the first few days or as instructed by your doctor    If you had a scalp biopsy, warm shower water to the area on a daily basis should suffice    What should I use to clean my wound?     Cotton-tipped applicators (Qtips )    White petroleum jelly (Vaseline ). Use a clean new container and use Q-tips to apply.    Bandaids   as needed    Gentle soap     How should I care for my wound long term?    Do not get your wound dirty    Keep up with wound care for one week or until the area is healed.    A small scab will form and fall off by itself when the area is completely  healed. The area will be red and will become pink in color as it heals. Sun protection is very important for how your scar will turn out. Sunscreen with an SPF 30 or greater is recommended once the area is healed.    You should have some soreness but it should be mild and slowly go away over several days. Talk to your doctor about using tylenol for pain,    When should I call my doctor?  If you have increased:     Pain or swelling    Pus or drainage (clear or slightly yellow drainage is ok)    Temperature over 100F    Spreading redness or warmth around wound    When will I hear about my results?  The biopsy results can take 2-3 weeks to come back. The clinic will call you with the results, send you a [facility] message, or have you schedule a follow-up clinic or phone time to discuss the results. Contact our clinics if you do not hear from us in 3 weeks.     Who should I call with questions?    [facility]: [phone]     [facility]: [phone]    For urgent needs outside of business hours call the [facility] at [phone] and ask for the dermatology resident on call

## 2021-04-30 NOTE — PROGRESS NOTES
The following medication was given:     MEDICATION:  Lidocaine with epinephrine 1% [id number]  ROUTE: SQ  SITE: see procedure note  DOSE: 0.5 ml  LOT #: -EV  : Ophelia  EXPIRATION DATE: 02/2022  NDC#: 5050-9348-58   Was there drug waste? 1.5 ml  Multi-dose vial: Yes    Olga Brock LPN  April 30, 2021

## 2021-04-30 NOTE — PROGRESS NOTES
[facility] Dermatology Note  Encounter Date: Apr 30, 2021  Office Visit     Dermatology Problem List:  0. NUB - right dorsal hand -bx: 4/30/21  1. Eczema   -s/p triamcinolone   2. Solar lentigines   3. Nevi, removed by outside physician and normal on the lip, cosmetic per patient  4. Seborrheic keratoses  -s/p cryo     ____________________________________________    Assessment & Plan:    # Seborrheic keratosis, inflamed - right temple and left temporal scalp  - See procedure note.    # Neoplasm of uncertain behavior on the right dorsal hand. The differential diagnosis includes BLK vs other.  - See biopsy procedure note.     # Corn - right lateral foot x2. Recommended OTC footpad and pumice stone    Procedures Performed:   - Cryotherapy procedure note, location(s): right temple and left temporal scalp. After verbal consent and discussion of risks and benefits including, but not limited to, dyspigmentation/scar, blister, and pain, 7 lesion(s) was(were) treated with 1-2 mm freeze border for 1-2 cycles with liquid nitrogen. Post cryotherapy instructions were provided.     - Shave biopsy procedure note, location(s): right dorsal hand. After discussion of benefits and risks including but not limited to bleeding, infection, scar, incomplete removal, recurrence, and non-diagnostic biopsy, written consent and photographs were obtained. The area was cleaned with isopropyl alcohol. 0.5mL of 1% lidocaine with epinephrine was injected to obtain adequate anesthesia of lesion(s). Shave biopsy at site(s) performed. Hemostasis was achieved with aluminium chloride. Petrolatum ointment and a sterile dressing were applied. The patient tolerated the procedure and no complications were noted. The patient was provided with verbal and written post care instructions.     Follow-up: pending path results  TBSE in one year    Staff and Scribe:     Scribe Disclosure:   Veronica GLASS, am serving as a scribe to document  services personally performed by this physician, Dr. Katarzyna Richards, based on data collection and the provider's statements to me.     Provider Disclosure:   The documentation recorded by the scribe accurately reflects the services I personally performed and the decisions made by me.    Katarzyna Richards MD    Department of Dermatology  [facility]: Phone: [phone], Fax:[phone]  [facility]: Phone: [phone], Fax: [phone]      ____________________________________________    CC: Skin Check (areas of concern-right hand and right temple, dad  has SC, no personal hx)    HPI:  Ms. Rizwana Aguirre is a(n) 55 year old female who presents today as a return patient for skin check.    Last seen 10/31/17. At that time, no concerning lesions were noted.    Today, patient notes areas of concern on the right hand and right temple. The lesion on the hand is noted as an age spot and has been present for about a month. A spot on the left temporal scalp has been itching.    Patient is otherwise feeling well, without additional skin concerns.    Labs Reviewed:  N/A    Physical Exam:  Vitals: LMP 04/12/2017   SKIN: Total skin excluding the undergarment areas was performed. The exam included the head/face, neck, both arms, chest, back, abdomen, buttocks, both legs, digits and/or nails. Declines genital exam  - On the right dorsal hand: tan patch with scale about 1.3cm, skin lines are in tact  - There are tan to brown waxy stuck on papules with surrounding erythema on the right temple x6 and left temporal scalp x1.  - Corn - right lateral foot x2  - No other lesions of concern on areas examined.     Medications:  Current Outpatient Medications   Medication     atorvastatin (LIPITOR) 20 MG tablet     azelastine (OPTIVAR) 0.05 % ophthalmic solution     buPROPion (WELLBUTRIN XL) 150 MG 24 hr tablet     Coenzyme Q10  (COQ-10 PO)     losartan-hydrochlorothiazide (HYZAAR) 100-25 MG tablet     Multiple Vitamins-Minerals (MULTIVITAMIN ADULTS PO)     Omega-3 Fatty Acids (OMEGA-3 FISH OIL PO)     omeprazole (PRILOSEC) 40 MG DR capsule     tobramycin-dexamethasone (TOBRADEX) 0.3-0.1 % ophthalmic ointment     triamcinolone (KENALOG) 0.1 % external cream     No current facility-administered medications for this visit.       Past Medical History:   Patient Active Problem List   Diagnosis     HTN (hypertension), benign     CARDIOVASCULAR SCREENING; LDL GOAL LESS THAN 160     Hypertension goal BP (blood pressure) < 140/90     Vitamin D deficiency     Pruritus     Hyperphoria     Lesion of external ear     Classic migraine with aura     Urinary incontinence, unspecified incontinence type     Functional urinary incontinence     Atopic dermatitis, unspecified type     Atypical ductal hyperplasia of left breast     Mild aortic insufficiency     Non-rheumatic mitral regurgitation     Ductal carcinoma in situ (DCIS) of left breast     Morbid obesity (H)     Adjustment disorder with depressed mood     Nuclear sclerosis of both eyes     Cervical high risk HPV (human papillomavirus) test positive     Impaired fasting glucose     Past Medical History:   Diagnosis Date     Aortic insufficiency 10/16/09    Trace - per ECHO     Cervical high risk HPV (human papillomavirus) test positive 10/26/2018, 11/15/19    See problem list     DCIS (ductal carcinoma in situ) 11/14/2016    Left breast     Heart murmur      HTN (hypertension), benign      Hyperphoria 4/8/2013     Kidney stones      Migraines      Mitral insufficiency 10/16/09    Mild - per ECHO     Motion sickness      Seasonal allergic rhinitis      Uncomplicated asthma         CC No referring provider defined for this encounter. on close of this encounter.

## 2021-04-30 NOTE — NURSING NOTE
Rizwana Aguirre's goals for this visit include:   Chief Complaint   Patient presents with     Skin Check     areas of concern-right hand and right temple, dad  has SC, no personal hx       She requests these members of her care team be copied on today's visit information:     PCP: Kristine Marie    Referring Provider:  No referring provider defined for this encounter.    LMP 04/12/2017     Do you need any medication refills at today's visit?     Olga Brock LPN on 4/30/2021 at 9:44 AM

## 2021-04-30 NOTE — LETTER
4/30/2021         RE: Rizwana Aguirre  [address]  [place] [id number]        Dear Colleague,    Thank you for referring your patient, Rizwana Aguirre, to the [facility]. Please see a copy of my visit note below.    [facility] Dermatology Note  Encounter Date: Apr 30, 2021  Office Visit     Dermatology Problem List:  0. NUB - right dorsal hand -bx: 4/30/21  1. Eczema   -s/p triamcinolone   2. Solar lentigines   3. Nevi, removed by outside physician and normal on the lip, cosmetic per patient  4. Seborrheic keratoses  -s/p cryo     ____________________________________________    Assessment & Plan:    # Seborrheic keratosis, inflamed - right temple and left temporal scalp  - See procedure note.    # Neoplasm of uncertain behavior on the right dorsal hand. The differential diagnosis includes BLK vs other.  - See biopsy procedure note.     # Corn - right lateral foot x2. Recommended OTC footpad and pumice stone    Procedures Performed:   - Cryotherapy procedure note, location(s): right temple and left temporal scalp. After verbal consent and discussion of risks and benefits including, but not limited to, dyspigmentation/scar, blister, and pain, 7 lesion(s) was(were) treated with 1-2 mm freeze border for 1-2 cycles with liquid nitrogen. Post cryotherapy instructions were provided.     - Shave biopsy procedure note, location(s): right dorsal hand. After discussion of benefits and risks including but not limited to bleeding, infection, scar, incomplete removal, recurrence, and non-diagnostic biopsy, written consent and photographs were obtained. The area was cleaned with isopropyl alcohol. 0.5mL of 1% lidocaine with epinephrine was injected to obtain adequate anesthesia of lesion(s). Shave biopsy at site(s) performed. Hemostasis was achieved with aluminium chloride. Petrolatum ointment and a sterile dressing were applied. The patient tolerated the procedure and no  complications were noted. The patient was provided with verbal and written post care instructions.     Follow-up: pending path results  TBSE in one year    Staff and Scribe:     Scribe Disclosure:   I, Veronicaguy Alarcon, am serving as a scribe to document services personally performed by this physician, Dr. Katarzyna Richards, based on data collection and the provider's statements to me.     Provider Disclosure:   The documentation recorded by the scribe accurately reflects the services I personally performed and the decisions made by me.    Katarzyna Richards MD    Department of Dermatology  [facility]: Phone: [phone], Fax:[phone]  [facility]: Phone: [phone], Fax: [phone]      ____________________________________________    CC: Skin Check (areas of concern-right hand and right temple, dad  has SC, no personal hx)    HPI:  Ms. Rizwana Aguirre is a(n) 55 year old female who presents today as a return patient for skin check.    Last seen 10/31/17. At that time, no concerning lesions were noted.    Today, patient notes areas of concern on the right hand and right temple. The lesion on the hand is noted as an age spot and has been present for about a month. A spot on the left temporal scalp has been itching.    Patient is otherwise feeling well, without additional skin concerns.    Labs Reviewed:  N/A    Physical Exam:  Vitals: LMP 04/12/2017   SKIN: Total skin excluding the undergarment areas was performed. The exam included the head/face, neck, both arms, chest, back, abdomen, buttocks, both legs, digits and/or nails. Declines genital exam  - On the right dorsal hand: tan patch with scale about 1.3cm, skin lines are in tact  - There are tan to brown waxy stuck on papules with surrounding erythema on the right temple x6 and left temporal scalp x1.  - Corn - right lateral foot x2  - No other  lesions of concern on areas examined.     Medications:  Current Outpatient Medications   Medication     atorvastatin (LIPITOR) 20 MG tablet     azelastine (OPTIVAR) 0.05 % ophthalmic solution     buPROPion (WELLBUTRIN XL) 150 MG 24 hr tablet     Coenzyme Q10 (COQ-10 PO)     losartan-hydrochlorothiazide (HYZAAR) 100-25 MG tablet     Multiple Vitamins-Minerals (MULTIVITAMIN ADULTS PO)     Omega-3 Fatty Acids (OMEGA-3 FISH OIL PO)     omeprazole (PRILOSEC) 40 MG DR capsule     tobramycin-dexamethasone (TOBRADEX) 0.3-0.1 % ophthalmic ointment     triamcinolone (KENALOG) 0.1 % external cream     No current facility-administered medications for this visit.       Past Medical History:   Patient Active Problem List   Diagnosis     HTN (hypertension), benign     CARDIOVASCULAR SCREENING; LDL GOAL LESS THAN 160     Hypertension goal BP (blood pressure) < 140/90     Vitamin D deficiency     Pruritus     Hyperphoria     Lesion of external ear     Classic migraine with aura     Urinary incontinence, unspecified incontinence type     Functional urinary incontinence     Atopic dermatitis, unspecified type     Atypical ductal hyperplasia of left breast     Mild aortic insufficiency     Non-rheumatic mitral regurgitation     Ductal carcinoma in situ (DCIS) of left breast     Morbid obesity (H)     Adjustment disorder with depressed mood     Nuclear sclerosis of both eyes     Cervical high risk HPV (human papillomavirus) test positive     Impaired fasting glucose     Past Medical History:   Diagnosis Date     Aortic insufficiency 10/16/09    Trace - per ECHO     Cervical high risk HPV (human papillomavirus) test positive 10/26/2018, 11/15/19    See problem list     DCIS (ductal carcinoma in situ) 11/14/2016    Left breast     Heart murmur      HTN (hypertension), benign      Hyperphoria 4/8/2013     Kidney stones      Migraines      Mitral insufficiency 10/16/09    Mild - per ECHO     Motion sickness      Seasonal allergic rhinitis       Uncomplicated asthma         CC No referring provider defined for this encounter. on close of this encounter.    The following medication was given:     MEDICATION:  Lidocaine with epinephrine 1% 1:[id number]  ROUTE: SQ  SITE: see procedure note  DOSE: 0.5 ml  LOT #: -EV  : Hospira  EXPIRATION DATE: 02/2022  NDC#: 2432-5279-58   Was there drug waste? 1.5 ml  Multi-dose vial: Yes    Olga Brock LPN  April 30, 2021        Again, thank you for allowing me to participate in the care of your patient.        Sincerely,        Katarzyna Richards MD

## 2021-05-04 LAB — COPATH REPORT: NORMAL

## 2021-08-07 DIAGNOSIS — F43.21 ADJUSTMENT DISORDER WITH DEPRESSED MOOD: ICD-10-CM

## 2021-08-09 RX ORDER — BUPROPION HYDROCHLORIDE 150 MG/1
TABLET ORAL
Qty: 90 TABLET | Refills: 0 | Status: SHIPPED | OUTPATIENT
Start: 2021-08-09 | End: 2021-11-03

## 2021-08-09 NOTE — TELEPHONE ENCOUNTER
Prescription approved per [facility] Refill Protocol.        Olga Kearns RN  [facility]

## 2021-09-26 ENCOUNTER — HEALTH MAINTENANCE LETTER (OUTPATIENT)
Age: 56
End: 2021-09-26

## 2021-11-03 DIAGNOSIS — F43.21 ADJUSTMENT DISORDER WITH DEPRESSED MOOD: ICD-10-CM

## 2021-11-03 RX ORDER — BUPROPION HYDROCHLORIDE 150 MG/1
TABLET ORAL
Qty: 90 TABLET | Refills: 0 | Status: SHIPPED | OUTPATIENT
Start: 2021-11-03 | End: 2021-12-04

## 2021-11-03 NOTE — TELEPHONE ENCOUNTER
Routing refill request to provider for review/approval because:        SSRIs Protocol [id number]/03/2021 12:21 AM   PHQ-9 score less than 5 in past 6 months Protocol Details    Recent (6 mo) or future (30 days) visit within the authorizing provider's specialty      Lula Mccollum RN, BSN   [facility]

## 2021-12-04 ENCOUNTER — MYC MEDICAL ADVICE (OUTPATIENT)
Dept: FAMILY MEDICINE | Facility: CLINIC | Age: 56
End: 2021-12-04
Payer: COMMERCIAL

## 2021-12-06 NOTE — TELEPHONE ENCOUNTER
Refer to refill encounter from 12/4/21.   Request sent to refill pool.      Tatiana Hayes RN  [facility]

## 2021-12-20 ENCOUNTER — ANCILLARY PROCEDURE (OUTPATIENT)
Dept: MAMMOGRAPHY | Facility: CLINIC | Age: 56
End: 2021-12-20
Attending: PREVENTIVE MEDICINE
Payer: COMMERCIAL

## 2021-12-20 DIAGNOSIS — Z12.31 VISIT FOR SCREENING MAMMOGRAM: ICD-10-CM

## 2021-12-20 PROCEDURE — 77063 BREAST TOMOSYNTHESIS BI: CPT | Mod: GC | Performed by: RADIOLOGY

## 2021-12-20 PROCEDURE — 77067 SCR MAMMO BI INCL CAD: CPT | Mod: GC | Performed by: RADIOLOGY

## 2022-01-22 DIAGNOSIS — K21.9 LPRD (LARYNGOPHARYNGEAL REFLUX DISEASE): ICD-10-CM

## 2022-01-22 DIAGNOSIS — I10 HYPERTENSION GOAL BP (BLOOD PRESSURE) < 140/90: ICD-10-CM

## 2022-01-22 DIAGNOSIS — R49.9 CHANGE IN VOICE: ICD-10-CM

## 2022-01-24 RX ORDER — OMEPRAZOLE 40 MG/1
40 CAPSULE, DELAYED RELEASE ORAL DAILY
Qty: 90 CAPSULE | Refills: 0 | Status: SHIPPED | OUTPATIENT
Start: 2022-01-24 | End: 2022-04-29

## 2022-01-24 RX ORDER — LOSARTAN POTASSIUM AND HYDROCHLOROTHIAZIDE 25; 100 MG/1; MG/1
TABLET ORAL
Qty: 90 TABLET | Refills: 0 | Status: SHIPPED | OUTPATIENT
Start: 2022-01-24 | End: 2022-02-16

## 2022-02-16 ENCOUNTER — VIRTUAL VISIT (OUTPATIENT)
Dept: FAMILY MEDICINE | Facility: CLINIC | Age: 57
End: 2022-02-16
Payer: COMMERCIAL

## 2022-02-16 DIAGNOSIS — I10 HYPERTENSION GOAL BP (BLOOD PRESSURE) < 140/90: ICD-10-CM

## 2022-02-16 DIAGNOSIS — F43.21 ADJUSTMENT DISORDER WITH DEPRESSED MOOD: ICD-10-CM

## 2022-02-16 DIAGNOSIS — E78.5 HYPERLIPIDEMIA LDL GOAL <130: Primary | ICD-10-CM

## 2022-02-16 DIAGNOSIS — E66.01 MORBID OBESITY (H): ICD-10-CM

## 2022-02-16 DIAGNOSIS — R73.01 IMPAIRED FASTING GLUCOSE: ICD-10-CM

## 2022-02-16 DIAGNOSIS — E55.9 VITAMIN D DEFICIENCY: ICD-10-CM

## 2022-02-16 PROCEDURE — 99214 OFFICE O/P EST MOD 30 MIN: CPT | Mod: 95 | Performed by: PREVENTIVE MEDICINE

## 2022-02-16 RX ORDER — LOSARTAN POTASSIUM AND HYDROCHLOROTHIAZIDE 25; 100 MG/1; MG/1
1 TABLET ORAL DAILY
Qty: 90 TABLET | Refills: 3 | Status: SHIPPED | OUTPATIENT
Start: 2022-02-16 | End: 2023-04-12

## 2022-02-16 RX ORDER — ATORVASTATIN CALCIUM 20 MG/1
20 TABLET, FILM COATED ORAL DAILY
Qty: 90 TABLET | Refills: 3 | Status: SHIPPED | OUTPATIENT
Start: 2022-02-16 | End: 2023-02-15

## 2022-02-16 RX ORDER — BUPROPION HYDROCHLORIDE 150 MG/1
TABLET ORAL
Qty: 90 TABLET | Refills: 3 | Status: SHIPPED | OUTPATIENT
Start: 2022-02-16 | End: 2023-01-24

## 2022-02-16 ASSESSMENT — PATIENT HEALTH QUESTIONNAIRE - PHQ9: SUM OF ALL RESPONSES TO PHQ QUESTIONS 1-9: 1

## 2022-02-16 NOTE — PROGRESS NOTES
Jerri is a 56 year old who is being evaluated via a billable video visit.      How would you like to obtain your AVS? MyChart  If the video visit is dropped, the invitation should be resent by: Text to cell phone: In chart  Will anyone else be joining your video visit? No      Video Start Time: 3:56 PM    Assessment & Plan     Hyperlipidemia LDL goal <130  -continue statin   - Lipid panel reflex to direct LDL Non-fasting  - atorvastatin (LIPITOR) 20 MG tablet  Dispense: 90 tablet; Refill: 3    LDL Cholesterol Calculated   Date Value Ref Range Status   02/12/2021 150 (H) <100 mg/dL Final     Comment:     Above desirable:  100-129 mg/dl  Borderline High:  130-159 mg/dL  High:             160-189 mg/dL  Very high:       >189 mg/dl       Adjustment disorder with depressed mood  -continue current dose   - buPROPion (WELLBUTRIN XL) 150 MG 24 hr tablet  Dispense: 90 tablet; Refill: 3    Morbid obesity (H)  -try and get 150 minutes of exercise per week    Hypertension goal BP (blood pressure) < 140/90  -at goal  -continue current medication  -await labs  -refills provided   - Albumin Random Urine Quantitative with Creat Ratio  - Basic metabolic panel  - losartan-hydrochlorothiazide (HYZAAR) 100-25 MG tablet  Dispense: 90 tablet; Refill: 3  - CBC with platelets    Vitamin D deficiency  - Vitamin D Deficiency    Impaired fasting glucose  - Hemoglobin A1c    Lab Results   Component Value Date    A1C 5.8 02/12/2021       Ordering of each unique test  Prescription drug management  30 minutes spent on the date of the encounter doing chart review, history and exam, documentation and further activities per the note         Return in about 1 year (around 2/16/2023) for Follow up, Routine preventive, with me, in person.    Kristine Marie MD MPH    [facility]    Hernan Guthrie is a 56 year old who presents for the following health issues:    HPI       General Questionnaire:  How many servings of fruits  and vegetables do you eat daily?: 4 or more  On average, how many sweetened beverages do you drink each day (Examples: soda, juice, sweet tea, etc. Do NOT count diet or artificially sweetened beverages)?: 0  How many minutes a day do you exercise enough to make your heart beat faster?: 9 or less  How many days a week do you exercise enough to make your heart beat faster?: 3 or less  How many days per week do you miss taking your medication?: 0  What is the reason for your visit today?: General checkup - prescription refills  What are your symptoms?: no symptoms    Hyperlipidemia Follow-Up      Are you regularly taking any medication or supplement to lower your cholesterol?   Yes- statin    Are you having muscle aches or other side effects that you think could be caused by your cholesterol lowering medication?  No    Hypertension Follow-up      Do you check your blood pressure regularly outside of the clinic? Yes     Are you following a low salt diet? Yes    Are your blood pressures ever more than 140 on the top number (systolic) OR more   than 90 on the bottom number (diastolic), for example 140/90? No     Readings at home 138/78.  No chest pain  No pedal edema  No headaches  No vision changes     Adjustment disorder Followup    How are you doing with your depression since your last visit? No change    Are you having other symptoms that might be associated with depression? No    Have you had a significant life event?  No     Are you feeling anxious or having panic attacks?   No    Do you have any concerns with your use of alcohol or other drugs? No   Sleep is good    Weight:  -no regular exercise    Pre diabetes:  -no regular exercise    Social History     Tobacco Use     Smoking status: Never Smoker     Smokeless tobacco: Never Used   Substance Use Topics     Alcohol use: Yes     Alcohol/week: 0.0 standard drinks     Comment: Social     Drug use: No     PHQ 10/26/2018 11/15/2019 2/12/2021   PHQ-9 Total Score 3 3 1    Q9: Thoughts of better off dead/self-harm past 2 weeks Not at all Not at all Not at all     SUSHIL-7 SCORE 1/16/2018 10/26/2018   Total Score 5 2         Suicide Assessment Five-step Evaluation and Treatment (SAFE-T)        Review of Systems   Constitutional, HEENT, cardiovascular, pulmonary, gi and gu systems are negative, except as otherwise noted.      Objective           Vitals:  No vitals were obtained today due to virtual visit.    Physical Exam   GENERAL: Healthy, alert and no distress  EYES: Eyes grossly normal to inspection.  No discharge or erythema, or obvious scleral/conjunctival abnormalities.  RESP: No audible wheeze, cough, or visible cyanosis.  No visible retractions or increased work of breathing.    SKIN: Visible skin clear. No significant rash, abnormal pigmentation or lesions.  NEURO: Cranial nerves grossly intact.  Mentation and speech appropriate for age.  PSYCH: Mentation appears normal, affect normal/bright, judgement and insight intact, normal speech.    No results found for this or any previous visit (from the past 24 hour(s)).        Video-Visit Details    Type of service:  Video Visit    Video End Time:4:08 PM    Originating Location (pt. Location): Home    Distant Location (provider location):  [facility]     Platform used for Video Visit: [employer]

## 2022-02-16 NOTE — PATIENT INSTRUCTIONS
Please schedule an appointment for labs only.  You are due for a Covid vaccine booster.  I would also recommend SHINGRIX vaccine. This is a 2 dose series, second dose is 2-6 months from the first dose, can also get at your local pharmacy. Do check with insurance to make sure they cover this vaccine.

## 2022-02-23 ENCOUNTER — IMMUNIZATION (OUTPATIENT)
Dept: NURSING | Facility: CLINIC | Age: 57
End: 2022-02-23
Payer: COMMERCIAL

## 2022-02-23 PROCEDURE — 91305 COVID-19,PF,PFIZER (12+ YRS): CPT

## 2022-02-23 PROCEDURE — 0054A COVID-19,PF,PFIZER (12+ YRS): CPT

## 2022-02-24 ENCOUNTER — LAB (OUTPATIENT)
Dept: LAB | Facility: CLINIC | Age: 57
End: 2022-02-24
Payer: COMMERCIAL

## 2022-02-24 DIAGNOSIS — I10 HYPERTENSION GOAL BP (BLOOD PRESSURE) < 140/90: ICD-10-CM

## 2022-02-24 DIAGNOSIS — R73.01 IMPAIRED FASTING GLUCOSE: ICD-10-CM

## 2022-02-24 DIAGNOSIS — E55.9 VITAMIN D DEFICIENCY: ICD-10-CM

## 2022-02-24 DIAGNOSIS — E78.5 HYPERLIPIDEMIA LDL GOAL <130: ICD-10-CM

## 2022-02-24 LAB
ANION GAP SERPL CALCULATED.3IONS-SCNC: 7 MMOL/L (ref 3–14)
BUN SERPL-MCNC: 12 MG/DL (ref 7–30)
CALCIUM SERPL-MCNC: 9.2 MG/DL (ref 8.5–10.1)
CHLORIDE BLD-SCNC: 101 MMOL/L (ref 94–109)
CHOLEST SERPL-MCNC: 191 MG/DL
CO2 SERPL-SCNC: 28 MMOL/L (ref 20–32)
CREAT SERPL-MCNC: 0.61 MG/DL (ref 0.52–1.04)
CREAT UR-MCNC: 17 MG/DL
DEPRECATED CALCIDIOL+CALCIFEROL SERPL-MC: 17 UG/L (ref 20–75)
ERYTHROCYTE [DISTWIDTH] IN BLOOD BY AUTOMATED COUNT: 12.9 % (ref 10–15)
FASTING STATUS PATIENT QL REPORTED: YES
GFR SERPL CREATININE-BSD FRML MDRD: >90 ML/MIN/1.73M2
GLUCOSE BLD-MCNC: 111 MG/DL (ref 70–99)
HBA1C MFR BLD: 6.1 % (ref 0–5.6)
HCT VFR BLD AUTO: 41.5 % (ref 35–47)
HDLC SERPL-MCNC: 67 MG/DL
HGB BLD-MCNC: 13.4 G/DL (ref 11.7–15.7)
LDLC SERPL CALC-MCNC: 92 MG/DL
MCH RBC QN AUTO: 29.3 PG (ref 26.5–33)
MCHC RBC AUTO-ENTMCNC: 32.3 G/DL (ref 31.5–36.5)
MCV RBC AUTO: 91 FL (ref 78–100)
MICROALBUMIN UR-MCNC: <5 MG/L
MICROALBUMIN/CREAT UR: NORMAL MG/G{CREAT}
NONHDLC SERPL-MCNC: 124 MG/DL
PLATELET # BLD AUTO: 244 10E3/UL (ref 150–450)
POTASSIUM BLD-SCNC: 3.3 MMOL/L (ref 3.4–5.3)
RBC # BLD AUTO: 4.58 10E6/UL (ref 3.8–5.2)
SODIUM SERPL-SCNC: 136 MMOL/L (ref 133–144)
TRIGL SERPL-MCNC: 162 MG/DL
WBC # BLD AUTO: 7 10E3/UL (ref 4–11)

## 2022-02-24 PROCEDURE — 36415 COLL VENOUS BLD VENIPUNCTURE: CPT

## 2022-02-24 PROCEDURE — 83036 HEMOGLOBIN GLYCOSYLATED A1C: CPT

## 2022-02-24 PROCEDURE — 82043 UR ALBUMIN QUANTITATIVE: CPT

## 2022-02-24 PROCEDURE — 80048 BASIC METABOLIC PNL TOTAL CA: CPT

## 2022-02-24 PROCEDURE — 80061 LIPID PANEL: CPT

## 2022-02-24 PROCEDURE — 82306 VITAMIN D 25 HYDROXY: CPT

## 2022-02-24 PROCEDURE — 85027 COMPLETE CBC AUTOMATED: CPT

## 2022-02-24 NOTE — RESULT ENCOUNTER NOTE
Rizwana,    Three month glucose number is in a pre diabetic range, a little worse compared to last check.    Regular exercise and weight loss will help with this.     Basic blood count is not showing anemia or infection.  Other labs are pending.     Please do not hesitate to call us at [phone] if you have any questions or concerns.    Thank you,    Kristine Marie MD MPH

## 2022-02-25 NOTE — RESULT ENCOUNTER NOTE
Rizwana,     Vitamin D levels are low. Please take over the counter Vitamin D 3 in a dose of 5000 units for 3 months.  Urine sample is not showing any abnormal protein.  LDL cholesterol is at goal and much improved from last check. Triglycerides are slightly elevated, avoid fried, fatty foods and aim for 150 minutes of exercise per week.   Kidney function is normal.  Potassium is borderline low, increase intake of potassium rich foods such as bananas, oranges, avocados and green leafy veggies.    Please do not hesitate to call us at [phone] if you have any questions or concerns.    Thank you,    Kristine Marie MD MPH

## 2022-03-13 ENCOUNTER — HEALTH MAINTENANCE LETTER (OUTPATIENT)
Age: 57
End: 2022-03-13

## 2022-03-18 DIAGNOSIS — L20.9 ATOPIC DERMATITIS, UNSPECIFIED TYPE: ICD-10-CM

## 2022-03-18 RX ORDER — TRIAMCINOLONE ACETONIDE 1 MG/G
CREAM TOPICAL
Qty: 30 G | Refills: 1 | Status: SHIPPED | OUTPATIENT
Start: 2022-03-18 | End: 2022-09-01

## 2022-05-18 ENCOUNTER — E-VISIT (OUTPATIENT)
Dept: FAMILY MEDICINE | Facility: CLINIC | Age: 57
End: 2022-05-18
Payer: COMMERCIAL

## 2022-05-18 DIAGNOSIS — Z71.84 TRAVEL ADVICE ENCOUNTER: Primary | ICD-10-CM

## 2022-05-18 PROCEDURE — 99421 OL DIG E/M SVC 5-10 MIN: CPT | Performed by: PREVENTIVE MEDICINE

## 2022-05-18 RX ORDER — AZITHROMYCIN 500 MG/1
500 TABLET, FILM COATED ORAL DAILY
Qty: 3 TABLET | Refills: 0 | Status: SHIPPED | OUTPATIENT
Start: 2022-05-18 | End: 2022-05-21

## 2022-05-18 NOTE — PATIENT INSTRUCTIONS
Thank you for choosing us for your care. I have placed an order for a prescription so that you can start treatment. View your full visit summary for details by clicking on the link below. Your pharmacist will able to address any questions you may have about the medication.     If you're not feeling better within 5-7 days, please schedule an appointment.  You can schedule an appointment right here in [facility], or call [phone]  If the visit is for the same symptoms as your eVisit, we'll refund the cost of your eVisit if seen within seven days.

## 2022-07-07 ENCOUNTER — OFFICE VISIT (OUTPATIENT)
Dept: DERMATOLOGY | Facility: CLINIC | Age: 57
End: 2022-07-07
Payer: COMMERCIAL

## 2022-07-07 DIAGNOSIS — L81.4 LENTIGINES: ICD-10-CM

## 2022-07-07 DIAGNOSIS — L82.1 SEBORRHEIC KERATOSES: ICD-10-CM

## 2022-07-07 DIAGNOSIS — D18.01 CHERRY ANGIOMA: ICD-10-CM

## 2022-07-07 DIAGNOSIS — D22.9 MULTIPLE BENIGN NEVI: Primary | ICD-10-CM

## 2022-07-07 DIAGNOSIS — Z12.83 ENCOUNTER FOR SCREENING FOR MALIGNANT NEOPLASM OF SKIN: ICD-10-CM

## 2022-07-07 DIAGNOSIS — L82.0 INFLAMED SEBORRHEIC KERATOSIS: ICD-10-CM

## 2022-07-07 PROCEDURE — 17110 DESTRUCTION B9 LES UP TO 14: CPT | Performed by: NURSE PRACTITIONER

## 2022-07-07 PROCEDURE — 99213 OFFICE O/P EST LOW 20 MIN: CPT | Mod: 25 | Performed by: NURSE PRACTITIONER

## 2022-07-07 ASSESSMENT — PAIN SCALES - GENERAL: PAINLEVEL: NO PAIN (0)

## 2022-07-07 NOTE — PROGRESS NOTES
[facility] Dermatology Note  Encounter Date: Jul 7, 2022  Office Visit     Reviewed patients past medical history and pertinent chart review prior to patients visit today.     Dermatology Problem List:  1. Eczema   -s/p triamcinolone   2. Solar lentigines   3. Nevi, removed by outside physician and normal on the lip, cosmetic per patient  4. Seborrheic keratoses  -s/p cryo  5. Lichenoid keratosis, biopsy right dorsal hand 4/2021    Her father has had skin cancer, she says he had a large spot removed from his back and had to have a skin graft.  She says he sees his dermatologist every 3 to 6 months.  She is going to ask him if he had melanoma but she is unsure of the diagnosis.    ____________________________________________    Assessment & Plan:     # Irritated and inflamed Seborrheic Keratosis, right cheek x4, left temple x2, left forehead x2. Discussed treatment options with patient including no treatment, cryotherapy, and shave removal. Patient prefers cryotherapy today due to irritation and itching. After verbal consent and discussion of risks and benefits including but no limited to dyspigmentation/scar, blister, and pain, 7 was(were) treated with 1-2mm freeze border for 2 cycles with liquid nitrogen. Post cryotherapy instructions were provided.       # Benign skin findings including: seborrheic keratoses, cherry angioma, lentigines and benign nevi.   - No further intervention required. Patient to report changes.   - Patient reassured of the benign nature of these lesions.    #Signs and Symptoms of non-melanoma skin cancer and ABCDEs of melanoma reviewed with patient. Patient encouraged to perform monthly self skin exams and educated on how to perform them. UV precautions reviewed with patient. Patient was asked about new or changing moles/lesions on body.     #Reviewed Sunscreen: Apply 20 minutes prior to going outdoors and reapply every two hours, when wet or sweating. We recommend using  an SPF 30 or higher, and to use one that is water resistant.       Follow-up:  1-2 years (annually if her father has melanoma) for follow up full body skin exam, prn for new or changing lesions or new concerns    Carlie Hinojosa, ANTONIO CNP on 7/7/2022 at 4:01 PM    ____________________________________________    CC: Derm Problem and Skin Check    HPI:  Ms. Rizwana Aguirre is a(n) 56 year old female who presents today as a return patient for a full body skin cancer screening. Patient has concerns today about some irritated spots on her face.  She thinks they are seborrheic keratosis and would like them frozen..     Patient is otherwise feeling well, without additional skin concerns.     Physical Exam:  Vitals: LMP 04/12/2017   SKIN: Total skin excluding the genitalia areas was performed. The exam included the head/face, neck, both arms, chest, back, abdomen, both legs, digits, mons pubis, buttock and nails.   -several 1-2mm red dome shaped symmetric papules scattered on the trunk  -multiple tan/brown flat round macules and raised papules scattered throughout trunk, extremities and head. No worrisome features for malignancy noted on examination.  -scattered tan, homogenous macules scattered on sun exposed areas of trunk, extremities and face.   -scattered waxy, stuck on tan/brown papules and patches on the trunk     - No other lesions of concern on areas examined.     Medications:  Current Outpatient Medications   Medication     triamcinolone (KENALOG) 0.1 % external cream     atorvastatin (LIPITOR) 20 MG tablet     azelastine (OPTIVAR) 0.05 % ophthalmic solution     buPROPion (WELLBUTRIN XL) 150 MG 24 hr tablet     Coenzyme Q10 (COQ-10 PO)     losartan-hydrochlorothiazide (HYZAAR) 100-25 MG tablet     Multiple Vitamins-Minerals (MULTIVITAMIN ADULTS PO)     Omega-3 Fatty Acids (OMEGA-3 FISH OIL PO)     omeprazole (PRILOSEC) 40 MG DR capsule     tobramycin-dexamethasone (TOBRADEX) 0.3-0.1 % ophthalmic ointment     No  current facility-administered medications for this visit.      Past Medical History:   Patient Active Problem List   Diagnosis     HTN (hypertension), benign     CARDIOVASCULAR SCREENING; LDL GOAL LESS THAN 160     Hypertension goal BP (blood pressure) < 140/90     Vitamin D deficiency     Pruritus     Hyperphoria     Lesion of external ear     Classic migraine with aura     Urinary incontinence, unspecified incontinence type     Functional urinary incontinence     Atopic dermatitis, unspecified type     Atypical ductal hyperplasia of left breast     Mild aortic insufficiency     Non-rheumatic mitral regurgitation     Ductal carcinoma in situ (DCIS) of left breast     Morbid obesity (H)     Adjustment disorder with depressed mood     Nuclear sclerosis of both eyes     Cervical high risk HPV (human papillomavirus) test positive     Impaired fasting glucose     Past Medical History:   Diagnosis Date     Aortic insufficiency 10/16/09    Trace - per ECHO     Breast cancer (H)      Cervical high risk HPV (human papillomavirus) test positive 10/26/2018, 11/15/19    See problem list     DCIS (ductal carcinoma in situ) 11/14/2016    Left breast     Heart murmur      HTN (hypertension), benign      Hyperphoria 4/8/2013     Kidney stones      Migraines      Mitral insufficiency 10/16/09    Mild - per ECHO     Motion sickness      Seasonal allergic rhinitis      Uncomplicated asthma        CC Referred Self, MD  No address on file on close of this encounter.

## 2022-07-07 NOTE — LETTER
7/7/2022         RE: Rizwana Aguirre  [address]  [place] [facility] [address]        Dear Colleague,    Thank you for referring your patient, Rizwana Aguirre, to the [facility]. Please see a copy of my visit note below.    [facility] Dermatology Note  Encounter Date: Jul 7, 2022  Office Visit     Reviewed patients past medical history and pertinent chart review prior to patients visit today.     Dermatology Problem List:  1. Eczema   -s/p triamcinolone   2. Solar lentigines   3. Nevi, removed by outside physician and normal on the lip, cosmetic per patient  4. Seborrheic keratoses  -s/p cryo  5. Lichenoid keratosis, biopsy right dorsal hand 4/2021    Her father has had skin cancer, she says he had a large spot removed from his back and had to have a skin graft.  She says he sees his dermatologist every 3 to 6 months.  She is going to ask him if he had melanoma but she is unsure of the diagnosis.    ____________________________________________    Assessment & Plan:     # Irritated and inflamed Seborrheic Keratosis, right cheek x4, left temple x2, left forehead x2. Discussed treatment options with patient including no treatment, cryotherapy, and shave removal. Patient prefers cryotherapy today due to irritation and itching. After verbal consent and discussion of risks and benefits including but no limited to dyspigmentation/scar, blister, and pain, 7 was(were) treated with 1-2mm freeze border for 2 cycles with liquid nitrogen. Post cryotherapy instructions were provided.       # Benign skin findings including: seborrheic keratoses, cherry angioma, lentigines and benign nevi.   - No further intervention required. Patient to report changes.   - Patient reassured of the benign nature of these lesions.    #Signs and Symptoms of non-melanoma skin cancer and ABCDEs of melanoma reviewed with patient. Patient encouraged to perform monthly self skin exams and educated on how to  perform them. UV precautions reviewed with patient. Patient was asked about new or changing moles/lesions on body.     #Reviewed Sunscreen: Apply 20 minutes prior to going outdoors and reapply every two hours, when wet or sweating. We recommend using an SPF 30 or higher, and to use one that is water resistant.       Follow-up:  1-2 years (annually if her father has melanoma) for follow up full body skin exam, prn for new or changing lesions or new concerns    Carlie Hinojosa, APRN CNP on 7/7/2022 at 4:01 PM    ____________________________________________    CC: Derm Problem and Skin Check    HPI:  Ms. Rizwana Aguirre is a(n) 56 year old female who presents today as a return patient for a full body skin cancer screening. Patient has concerns today about some irritated spots on her face.  She thinks they are seborrheic keratosis and would like them frozen..     Patient is otherwise feeling well, without additional skin concerns.     Physical Exam:  Vitals: LMP 04/12/2017   SKIN: Total skin excluding the genitalia areas was performed. The exam included the head/face, neck, both arms, chest, back, abdomen, both legs, digits, mons pubis, buttock and nails.   -several 1-2mm red dome shaped symmetric papules scattered on the trunk  -multiple tan/brown flat round macules and raised papules scattered throughout trunk, extremities and head. No worrisome features for malignancy noted on examination.  -scattered tan, homogenous macules scattered on sun exposed areas of trunk, extremities and face.   -scattered waxy, stuck on tan/brown papules and patches on the trunk     - No other lesions of concern on areas examined.     Medications:  Current Outpatient Medications   Medication     triamcinolone (KENALOG) 0.1 % external cream     atorvastatin (LIPITOR) 20 MG tablet     azelastine (OPTIVAR) 0.05 % ophthalmic solution     buPROPion (WELLBUTRIN XL) 150 MG 24 hr tablet     Coenzyme Q10 (COQ-10 PO)     losartan-hydrochlorothiazide  (HYZAAR) 100-25 MG tablet     Multiple Vitamins-Minerals (MULTIVITAMIN ADULTS PO)     Omega-3 Fatty Acids (OMEGA-3 FISH OIL PO)     omeprazole (PRILOSEC) 40 MG DR capsule     tobramycin-dexamethasone (TOBRADEX) 0.3-0.1 % ophthalmic ointment     No current facility-administered medications for this visit.      Past Medical History:   Patient Active Problem List   Diagnosis     HTN (hypertension), benign     CARDIOVASCULAR SCREENING; LDL GOAL LESS THAN 160     Hypertension goal BP (blood pressure) < 140/90     Vitamin D deficiency     Pruritus     Hyperphoria     Lesion of external ear     Classic migraine with aura     Urinary incontinence, unspecified incontinence type     Functional urinary incontinence     Atopic dermatitis, unspecified type     Atypical ductal hyperplasia of left breast     Mild aortic insufficiency     Non-rheumatic mitral regurgitation     Ductal carcinoma in situ (DCIS) of left breast     Morbid obesity (H)     Adjustment disorder with depressed mood     Nuclear sclerosis of both eyes     Cervical high risk HPV (human papillomavirus) test positive     Impaired fasting glucose     Past Medical History:   Diagnosis Date     Aortic insufficiency 10/16/09    Trace - per ECHO     Breast cancer (H)      Cervical high risk HPV (human papillomavirus) test positive 10/26/2018, 11/15/19    See problem list     DCIS (ductal carcinoma in situ) 11/14/2016    Left breast     Heart murmur      HTN (hypertension), benign      Hyperphoria 4/8/2013     Kidney stones      Migraines      Mitral insufficiency 10/16/09    Mild - per ECHO     Motion sickness      Seasonal allergic rhinitis      Uncomplicated asthma        CC Referred Self, MD  No address on file on close of this encounter.      Again, thank you for allowing me to participate in the care of your patient.        Sincerely,        Carlie Hinojosa, ANTONIO CNP

## 2023-01-08 ENCOUNTER — HEALTH MAINTENANCE LETTER (OUTPATIENT)
Age: 58
End: 2023-01-08

## 2023-01-22 DIAGNOSIS — F43.21 ADJUSTMENT DISORDER WITH DEPRESSED MOOD: ICD-10-CM

## 2023-01-24 RX ORDER — BUPROPION HYDROCHLORIDE 150 MG/1
TABLET ORAL
Qty: 90 TABLET | Refills: 0 | Status: SHIPPED | OUTPATIENT
Start: 2023-01-24 | End: 2023-04-12

## 2023-01-24 NOTE — TELEPHONE ENCOUNTER
Called and left a voicemail message to return our call to schedule a follow up appointment.  Shelby Kumar MA  [facility]  2nd Floor  Primary Care

## 2023-02-03 ENCOUNTER — OFFICE VISIT (OUTPATIENT)
Dept: OPTOMETRY | Facility: CLINIC | Age: 58
End: 2023-02-03
Payer: COMMERCIAL

## 2023-02-03 DIAGNOSIS — H52.4 PRESBYOPIA: ICD-10-CM

## 2023-02-03 DIAGNOSIS — H50.53 HYPERPHORIA: ICD-10-CM

## 2023-02-03 DIAGNOSIS — H02.889 MEIBOMIAN GLAND DYSFUNCTION: ICD-10-CM

## 2023-02-03 DIAGNOSIS — H25.13 NUCLEAR SCLEROSIS OF BOTH EYES: ICD-10-CM

## 2023-02-03 DIAGNOSIS — R49.9 CHANGE IN VOICE: ICD-10-CM

## 2023-02-03 DIAGNOSIS — Z01.00 EXAMINATION OF EYES AND VISION: Primary | ICD-10-CM

## 2023-02-03 DIAGNOSIS — K21.9 LPRD (LARYNGOPHARYNGEAL REFLUX DISEASE): ICD-10-CM

## 2023-02-03 DIAGNOSIS — H10.13 ALLERGIC CONJUNCTIVITIS OF BOTH EYES: ICD-10-CM

## 2023-02-03 DIAGNOSIS — H52.13 MYOPIA OF BOTH EYES: ICD-10-CM

## 2023-02-03 PROCEDURE — 92310 CONTACT LENS FITTING OU: CPT | Mod: GA | Performed by: OPTOMETRIST

## 2023-02-03 PROCEDURE — 92015 DETERMINE REFRACTIVE STATE: CPT | Performed by: OPTOMETRIST

## 2023-02-03 PROCEDURE — 92014 COMPRE OPH EXAM EST PT 1/>: CPT | Performed by: OPTOMETRIST

## 2023-02-03 RX ORDER — OMEPRAZOLE 40 MG/1
40 CAPSULE, DELAYED RELEASE ORAL DAILY
Qty: 90 CAPSULE | Refills: 0 | Status: SHIPPED | OUTPATIENT
Start: 2023-02-03 | End: 2023-04-12

## 2023-02-03 RX ORDER — AZELASTINE HYDROCHLORIDE 0.5 MG/ML
1 SOLUTION/ DROPS OPHTHALMIC 2 TIMES DAILY
Qty: 6 ML | Refills: 11 | Status: SHIPPED | OUTPATIENT
Start: 2023-02-03 | End: 2024-04-05

## 2023-02-03 RX ORDER — LOTEPREDNOL ETABONATE 5 MG/ML
SUSPENSION/ DROPS OPHTHALMIC
Qty: 2.5 ML | Refills: 0 | Status: SHIPPED | OUTPATIENT
Start: 2023-02-03 | End: 2023-02-17

## 2023-02-03 ASSESSMENT — VISUAL ACUITY
OS_CC: 20/25
METHOD: SNELLEN - LINEAR
CORRECTION_TYPE: GLASSES
OS_SC: 20/400-
OD_CC: 20/40-1
OD_SC: 20/400-
OD_CC: 20/20
OS_CC: 20/30

## 2023-02-03 ASSESSMENT — KERATOMETRY
OS_AXISANGLE_DEGREES: 089
OD_K2POWER_DIOPTERS: 47.50
OD_AXISANGLE_DEGREES: 104
OS_K1POWER_DIOPTERS: 47.00
OS_AXISANGLE2_DEGREES: 179
OS_K2POWER_DIOPTERS: 47.50
OD_K1POWER_DIOPTERS: 46.75
OD_AXISANGLE2_DEGREES: 014

## 2023-02-03 ASSESSMENT — REFRACTION_WEARINGRX
OD_AXIS: 132
OS_VPRISM: 1.0
OS_ADD: +2.50
OD_ADD: +2.50
SPECS_TYPE: PAL
OD_VBASE: UP
OD_SPHERE: -6.00
OD_VPRISM: 1.0
OS_CYLINDER: +0.50
OS_AXIS: 135
OD_CYLINDER: +0.25
OS_SPHERE: -6.00
OS_VBASE: DOWN

## 2023-02-03 ASSESSMENT — REFRACTION_MANIFEST
OD_SPHERE: -6.00
OS_CYLINDER: +0.50
OS_ADD: +2.50
OD_ADD: +2.50
OD_AXIS: 132
METHOD_AUTOREFRACTION: 1
OS_AXIS: 135
OS_SPHERE: -6.00
OD_CYLINDER: +0.25

## 2023-02-03 ASSESSMENT — TONOMETRY
OS_IOP_MMHG: 21
IOP_METHOD: TONOPEN
OD_IOP_MMHG: 21

## 2023-02-03 ASSESSMENT — CUP TO DISC RATIO
OS_RATIO: 0.2
OD_RATIO: 0.2

## 2023-02-03 ASSESSMENT — REFRACTION_CURRENTRX
OS_SPHERE: -5.50
OD_BRAND: ALCON DAILIES TOTAL 1 BC 8.5, D 14.1
OD_SPHERE: -3.75
OS_BRAND: ALCON DAILIES TOTAL 1 BC 8.5, D 14.1

## 2023-02-03 ASSESSMENT — CONF VISUAL FIELD
OD_SUPERIOR_TEMPORAL_RESTRICTION: 0
OS_NORMAL: 1
OD_INFERIOR_NASAL_RESTRICTION: 0
OD_INFERIOR_TEMPORAL_RESTRICTION: 0
OS_INFERIOR_NASAL_RESTRICTION: 0
OD_SUPERIOR_NASAL_RESTRICTION: 0
OS_SUPERIOR_TEMPORAL_RESTRICTION: 0
OS_SUPERIOR_NASAL_RESTRICTION: 0
OD_NORMAL: 1
OS_INFERIOR_TEMPORAL_RESTRICTION: 0

## 2023-02-03 ASSESSMENT — REFRACTION_FINALRX
OD_VPRISM: 1.0
OS_VPRISM: 1.0

## 2023-02-03 ASSESSMENT — SLIT LAMP EXAM - LIDS
COMMENTS: MEIBOMIAN GLAND DYSFUNCTION
COMMENTS: MEIBOMIAN GLAND DYSFUNCTION

## 2023-02-03 ASSESSMENT — EXTERNAL EXAM - RIGHT EYE: OD_EXAM: NORMAL

## 2023-02-03 ASSESSMENT — EXTERNAL EXAM - LEFT EYE: OS_EXAM: NORMAL

## 2023-02-03 NOTE — LETTER
2/3/2023         RE: Rizwana Aguirre  [address] [facility] [address]        Dear Colleague,    Thank you for referring your patient, Rizwana Aguirre, to the [facility]. Please see a copy of my visit note below.    Chief Complaint   Patient presents with     Annual Eye Exam        Previous contact lens wearer? Yes: total 1  Comfort of contact lenses :good most the time sometimes od not good   Satisfied with current lenses: Yes        Last Eye Exam: 2-  Dilated Previously: Yes    What are you currently using to see?  glasses and contacts    Distance Vision Acuity: Noticed gradual change in both eyes    Near Vision Acuity: Not satisfied     Eye Comfort: good,often itchy   Do you use eye drops? : Yes: optivar and tobradex needs refills and also uses pataday  Occupation or Hobbies:  computer way too much       Sabi Ramirez Optometric Assistant, A.B.O.C.     Medical, surgical and family histories reviewed and updated 2/3/2023.       OBJECTIVE: See Ophthalmology exam    ASSESSMENT:    ICD-10-CM    1. Examination of eyes and vision  Z01.00 EYE EXAM (SIMPLE-NONBILLABLE)      2. Myopia of both eyes  H52.13 REFRACTION     CONTACT LENS FITTING,BILAT w/ signed waiver      3. Presbyopia  H52.4 REFRACTION      4. Hyperphoria  H50.53 REFRACTION      5. Allergic conjunctivitis of both eyes  H10.13 EYE EXAM (SIMPLE-NONBILLABLE)     tobramycin-dexamethasone (TOBRADEX) 0.3-0.1 % ophthalmic ointment     loteprednol (LOTEMAX) 0.5 % ophthalmic suspension     azelastine (OPTIVAR) 0.05 % ophthalmic solution      6. Meibomian gland dysfunction  H02.889 EYE EXAM (SIMPLE-NONBILLABLE)      7. Nuclear sclerosis of both eyes  H25.13 EYE EXAM (SIMPLE-NONBILLABLE)         PLAN:     Patient Instructions   Eyeglass prescription given.     Contact lens prescription given and form signed.     Optivar- 1 drop both eyes 2 x day as needed for itchy eyes.     Cold compresses- do not rub your  eyes.     Tobradex ointment to eyelids sparingly as needed.  Steroids can cause thinning of the skin and an increase in intraocular pressure.     Lotemax-1 drop both eyes 2-4 x day as needed for flare ups.  If you are using for over 1 week then schedule an appointment to see me for a glaucoma pressure check.     Ocusoft lid scrubs- allergy formula with tea tree oil at night or [employer] tea tree oil foam or oil cleanser.      Non preserved Systane or Refresh- 1 drop both eyes 2-4 x day. Ok to use with contact lenses.     You have the start of mild cataracts.  You may notice some blurred vision or glare with night driving.  It is important that you wear good sunglasses to protect your eyes from the ultraviolet light from the sun.  I recommend that you return in 1 year for an eye exam unless there are any sudden changes in your vision.        Return in 1 year for a complete eye exam or sooner if needed.     Joshua Rothman, OD                            Again, thank you for allowing me to participate in the care of your patient.        Sincerely,        Josuha Rothman, OD

## 2023-02-03 NOTE — PROGRESS NOTES
Chief Complaint   Patient presents with     Annual Eye Exam        Previous contact lens wearer? Yes: total 1  Comfort of contact lenses :good most the time sometimes od not good   Satisfied with current lenses: Yes        Last Eye Exam: 2-  Dilated Previously: Yes    What are you currently using to see?  glasses and contacts    Distance Vision Acuity: Noticed gradual change in both eyes    Near Vision Acuity: Not satisfied     Eye Comfort: good,often itchy   Do you use eye drops? : Yes: optivar and tobradex needs refills and also uses pataday  Occupation or Hobbies:  computer way too much       Sabi Ramirez Optometric Assistant, A.B.O.C.     Medical, surgical and family histories reviewed and updated 2/3/2023.       OBJECTIVE: See Ophthalmology exam    ASSESSMENT:    ICD-10-CM    1. Examination of eyes and vision  Z01.00 EYE EXAM (SIMPLE-NONBILLABLE)      2. Myopia of both eyes  H52.13 REFRACTION     CONTACT LENS FITTING,BILAT w/ signed waiver      3. Presbyopia  H52.4 REFRACTION      4. Hyperphoria  H50.53 REFRACTION      5. Allergic conjunctivitis of both eyes  H10.13 EYE EXAM (SIMPLE-NONBILLABLE)     tobramycin-dexamethasone (TOBRADEX) 0.3-0.1 % ophthalmic ointment     loteprednol (LOTEMAX) 0.5 % ophthalmic suspension     azelastine (OPTIVAR) 0.05 % ophthalmic solution      6. Meibomian gland dysfunction  H02.889 EYE EXAM (SIMPLE-NONBILLABLE)      7. Nuclear sclerosis of both eyes  H25.13 EYE EXAM (SIMPLE-NONBILLABLE)         PLAN:     Patient Instructions   Eyeglass prescription given.     Contact lens prescription given and form signed.     Optivar- 1 drop both eyes 2 x day as needed for itchy eyes.     Cold compresses- do not rub your eyes.     Tobradex ointment to eyelids sparingly as needed.  Steroids can cause thinning of the skin and an increase in intraocular pressure.     Lotemax-1 drop both eyes 2-4 x day as needed for flare ups.  If you are using for over 1 week then schedule an  appointment to see me for a glaucoma pressure check.     Ocusoft lid scrubs- allergy formula with tea tree oil at night or [employer] tea tree oil foam or oil cleanser.      Non preserved Systane or Refresh- 1 drop both eyes 2-4 x day. Ok to use with contact lenses.     You have the start of mild cataracts.  You may notice some blurred vision or glare with night driving.  It is important that you wear good sunglasses to protect your eyes from the ultraviolet light from the sun.  I recommend that you return in 1 year for an eye exam unless there are any sudden changes in your vision.        Return in 1 year for a complete eye exam or sooner if needed.     Joshua Rothman, OD

## 2023-02-03 NOTE — PATIENT INSTRUCTIONS
Eyeglass prescription given.     Contact lens prescription given and form signed.     Optivar- 1 drop both eyes 2 x day as needed for itchy eyes.     Cold compresses- do not rub your eyes.     Tobradex ointment to eyelids sparingly as needed.  Steroids can cause thinning of the skin and an increase in intraocular pressure.     Lotemax-1 drop both eyes 2-4 x day as needed for flare ups.  If you are using for over 1 week then schedule an appointment to see me for a glaucoma pressure check.     Ocusoft lid scrubs- allergy formula with tea tree oil at night or [employer] tea tree oil foam or oil cleanser.      Non preserved Systane or Refresh- 1 drop both eyes 2-4 x day. Ok to use with contact lenses.     You have the start of mild cataracts.  You may notice some blurred vision or glare with night driving.  It is important that you wear good sunglasses to protect your eyes from the ultraviolet light from the sun.  I recommend that you return in 1 year for an eye exam unless there are any sudden changes in your vision.        Return in 1 year for a complete eye exam or sooner if needed.     Joshua Rothman, OD    The affects of the dilating drops last for 4- 6 hours.  You will be more sensitive to light and vision will be blurry up close.  Do not drive if you do not feel comfortable.  Mydriatic sunglasses were given if needed.      Optometry Providers       Clinic Locations                                 Telephone Number   Dr. Qiana [place]  [facility]  [place] [phone]     [facility] Hours:                [facility] Hours:       [facility] Hours:   [address]   [address] [place] [address]   Frances[place] Hopkins [place] [address]    [place] [address]  Phone: [phone]                    Phone: [phone]     Phone: [phone]                       Monday 8:00-6:00                          Monday 8:00-6:00                          Monday 8:00-6:00              Tuesday 8:00-6:00                          Tuesday 8:00-6:00                          Tuesday 8:00-6:00              Wednesday 8:00-6:00                  Wednesday 8:00-6:00                   Wednesday 8:00-6:00      Thursday 8:00-6:00                        Thursday 8:00-6:00                         Thursday 8:00-6:00            Friday 8:00-5:00                              Friday 8:00-5:00                              Friday 8:00-5:00    [facility] Hours:   [address] [place] [address]  [phone]    Monday 9:00-6:00  Tuesday 9:00-6:00  Wednesday 9:00-6:00  Thursday 9:00-6:00  Friday 9:00-5:00  Please log on to [email or web address] to order your contact lenses.  The link is found on the [facility] page.  As always, Thank you for trusting us with your health care needs!

## 2023-02-15 DIAGNOSIS — E78.5 HYPERLIPIDEMIA LDL GOAL <130: ICD-10-CM

## 2023-02-15 RX ORDER — ATORVASTATIN CALCIUM 20 MG/1
20 TABLET, FILM COATED ORAL DAILY
Qty: 90 TABLET | Refills: 3 | Status: SHIPPED | OUTPATIENT
Start: 2023-02-15 | End: 2024-01-17

## 2023-02-17 ENCOUNTER — ANCILLARY ORDERS (OUTPATIENT)
Dept: MAMMOGRAPHY | Facility: CLINIC | Age: 58
End: 2023-02-17

## 2023-02-17 ENCOUNTER — ANCILLARY PROCEDURE (OUTPATIENT)
Dept: MAMMOGRAPHY | Facility: CLINIC | Age: 58
End: 2023-02-17
Attending: PREVENTIVE MEDICINE
Payer: COMMERCIAL

## 2023-02-17 DIAGNOSIS — Z12.31 VISIT FOR SCREENING MAMMOGRAM: ICD-10-CM

## 2023-02-17 PROCEDURE — 77067 SCR MAMMO BI INCL CAD: CPT | Performed by: RADIOLOGY

## 2023-02-17 PROCEDURE — 77063 BREAST TOMOSYNTHESIS BI: CPT | Performed by: RADIOLOGY

## 2023-04-07 ENCOUNTER — DOCUMENTATION ONLY (OUTPATIENT)
Dept: LAB | Facility: CLINIC | Age: 58
End: 2023-04-07

## 2023-04-07 ENCOUNTER — LAB (OUTPATIENT)
Dept: LAB | Facility: CLINIC | Age: 58
End: 2023-04-07
Payer: COMMERCIAL

## 2023-04-07 DIAGNOSIS — E78.5 HYPERLIPIDEMIA LDL GOAL <130: Primary | ICD-10-CM

## 2023-04-07 DIAGNOSIS — G89.29 CHRONIC PAIN OF BOTH KNEES: ICD-10-CM

## 2023-04-07 DIAGNOSIS — E55.9 VITAMIN D DEFICIENCY: ICD-10-CM

## 2023-04-07 DIAGNOSIS — M25.561 CHRONIC PAIN OF BOTH KNEES: ICD-10-CM

## 2023-04-07 DIAGNOSIS — M25.562 CHRONIC PAIN OF BOTH KNEES: ICD-10-CM

## 2023-04-07 DIAGNOSIS — I10 HYPERTENSION GOAL BP (BLOOD PRESSURE) < 140/90: ICD-10-CM

## 2023-04-07 DIAGNOSIS — R73.01 IMPAIRED FASTING GLUCOSE: ICD-10-CM

## 2023-04-07 DIAGNOSIS — E78.5 HYPERLIPIDEMIA LDL GOAL <130: ICD-10-CM

## 2023-04-07 LAB
HOLD SPECIMEN: NORMAL

## 2023-04-07 PROCEDURE — 80048 BASIC METABOLIC PNL TOTAL CA: CPT

## 2023-04-07 PROCEDURE — 36415 COLL VENOUS BLD VENIPUNCTURE: CPT

## 2023-04-07 PROCEDURE — 80061 LIPID PANEL: CPT

## 2023-04-07 PROCEDURE — 82306 VITAMIN D 25 HYDROXY: CPT

## 2023-04-07 PROCEDURE — 82550 ASSAY OF CK (CPK): CPT

## 2023-04-07 PROCEDURE — 83036 HEMOGLOBIN GLYCOSYLATED A1C: CPT

## 2023-04-07 NOTE — PROGRESS NOTES
Rizwana Aguirre has an upcoming lab appointment:    Future Appointments   Date Time Provider Department Center   4/7/2023  8:15 AM [facility]   4/12/2023  2:40 PM Kristine Marie MD [facility]     Patient is scheduled for the following lab(s): BMP, HepC and HIV pended    There is no order available. Please review and place either future orders or HMPO (Review of Health Maintenance Protocol Orders), as appropriate.    Health Maintenance Due   Topic     ANNUAL REVIEW OF HM ORDERS      HIV SCREENING      HEPATITIS C SCREENING      BMP      Julissa Barron

## 2023-04-10 LAB — HBA1C MFR BLD: 6.1 % (ref 0–5.6)

## 2023-04-11 LAB
ANION GAP SERPL CALCULATED.3IONS-SCNC: 5 MMOL/L (ref 3–14)
BUN SERPL-MCNC: 18 MG/DL (ref 7–30)
CALCIUM SERPL-MCNC: 9.2 MG/DL (ref 8.5–10.1)
CHLORIDE BLD-SCNC: 103 MMOL/L (ref 94–109)
CHOLEST SERPL-MCNC: 174 MG/DL
CO2 SERPL-SCNC: 29 MMOL/L (ref 20–32)
CREAT SERPL-MCNC: 0.74 MG/DL (ref 0.52–1.04)
DEPRECATED CALCIDIOL+CALCIFEROL SERPL-MC: 16 UG/L (ref 20–75)
FASTING STATUS PATIENT QL REPORTED: YES
GFR SERPL CREATININE-BSD FRML MDRD: >90 ML/MIN/1.73M2
GLUCOSE BLD-MCNC: 118 MG/DL (ref 70–99)
HDLC SERPL-MCNC: 62 MG/DL
LDLC SERPL CALC-MCNC: 84 MG/DL
NONHDLC SERPL-MCNC: 112 MG/DL
POTASSIUM BLD-SCNC: 3.3 MMOL/L (ref 3.4–5.3)
SODIUM SERPL-SCNC: 137 MMOL/L (ref 133–144)
TRIGL SERPL-MCNC: 140 MG/DL

## 2023-04-11 ASSESSMENT — ENCOUNTER SYMPTOMS
HEMATURIA: 0
NAUSEA: 0
FEVER: 0
DYSURIA: 0
EYE PAIN: 0
SHORTNESS OF BREATH: 1
HEMATOCHEZIA: 0
COUGH: 0
PALPITATIONS: 0
CONSTIPATION: 0
ABDOMINAL PAIN: 0
DIZZINESS: 0
WEAKNESS: 0
BREAST MASS: 0
HEARTBURN: 0
CHILLS: 0
MYALGIAS: 0
DIARRHEA: 0
NERVOUS/ANXIOUS: 0
JOINT SWELLING: 1
ARTHRALGIAS: 1
PARESTHESIAS: 0
SORE THROAT: 0
HEADACHES: 0
FREQUENCY: 0

## 2023-04-12 ENCOUNTER — ANCILLARY PROCEDURE (OUTPATIENT)
Dept: GENERAL RADIOLOGY | Facility: CLINIC | Age: 58
End: 2023-04-12
Attending: PREVENTIVE MEDICINE
Payer: COMMERCIAL

## 2023-04-12 ENCOUNTER — OFFICE VISIT (OUTPATIENT)
Dept: FAMILY MEDICINE | Facility: CLINIC | Age: 58
End: 2023-04-12
Payer: COMMERCIAL

## 2023-04-12 VITALS
OXYGEN SATURATION: 98 % | DIASTOLIC BLOOD PRESSURE: 81 MMHG | WEIGHT: 242.8 LBS | HEART RATE: 66 BPM | HEIGHT: 61 IN | TEMPERATURE: 97.9 F | BODY MASS INDEX: 45.84 KG/M2 | SYSTOLIC BLOOD PRESSURE: 121 MMHG | RESPIRATION RATE: 18 BRPM

## 2023-04-12 DIAGNOSIS — E55.9 VITAMIN D DEFICIENCY: ICD-10-CM

## 2023-04-12 DIAGNOSIS — K21.9 LPRD (LARYNGOPHARYNGEAL REFLUX DISEASE): ICD-10-CM

## 2023-04-12 DIAGNOSIS — I10 HYPERTENSION GOAL BP (BLOOD PRESSURE) < 140/90: ICD-10-CM

## 2023-04-12 DIAGNOSIS — G89.29 CHRONIC PAIN OF BOTH KNEES: ICD-10-CM

## 2023-04-12 DIAGNOSIS — R73.01 IMPAIRED FASTING GLUCOSE: ICD-10-CM

## 2023-04-12 DIAGNOSIS — F43.21 ADJUSTMENT DISORDER WITH DEPRESSED MOOD: ICD-10-CM

## 2023-04-12 DIAGNOSIS — R49.9 CHANGE IN VOICE: ICD-10-CM

## 2023-04-12 DIAGNOSIS — E66.01 MORBID OBESITY (H): ICD-10-CM

## 2023-04-12 DIAGNOSIS — M25.531 RIGHT WRIST PAIN: ICD-10-CM

## 2023-04-12 DIAGNOSIS — M25.561 CHRONIC PAIN OF BOTH KNEES: ICD-10-CM

## 2023-04-12 DIAGNOSIS — M25.562 CHRONIC PAIN OF BOTH KNEES: ICD-10-CM

## 2023-04-12 DIAGNOSIS — E78.5 HYPERLIPIDEMIA LDL GOAL <130: ICD-10-CM

## 2023-04-12 DIAGNOSIS — Z00.00 ROUTINE GENERAL MEDICAL EXAMINATION AT A HEALTH CARE FACILITY: Primary | ICD-10-CM

## 2023-04-12 PROCEDURE — 73562 X-RAY EXAM OF KNEE 3: CPT | Mod: TC | Performed by: RADIOLOGY

## 2023-04-12 PROCEDURE — 99396 PREV VISIT EST AGE 40-64: CPT | Performed by: PREVENTIVE MEDICINE

## 2023-04-12 PROCEDURE — 99214 OFFICE O/P EST MOD 30 MIN: CPT | Mod: 25 | Performed by: PREVENTIVE MEDICINE

## 2023-04-12 RX ORDER — LOSARTAN POTASSIUM AND HYDROCHLOROTHIAZIDE 25; 100 MG/1; MG/1
1 TABLET ORAL DAILY
Qty: 90 TABLET | Refills: 3 | Status: SHIPPED | OUTPATIENT
Start: 2023-04-12 | End: 2024-04-24

## 2023-04-12 RX ORDER — BUPROPION HYDROCHLORIDE 150 MG/1
150 TABLET ORAL EVERY MORNING
Qty: 90 TABLET | Refills: 1 | Status: SHIPPED | OUTPATIENT
Start: 2023-04-12 | End: 2023-10-25

## 2023-04-12 RX ORDER — OMEPRAZOLE 40 MG/1
40 CAPSULE, DELAYED RELEASE ORAL DAILY
Qty: 90 CAPSULE | Refills: 1 | Status: SHIPPED | OUTPATIENT
Start: 2023-04-12 | End: 2024-02-29

## 2023-04-12 ASSESSMENT — ENCOUNTER SYMPTOMS
SHORTNESS OF BREATH: 1
NERVOUS/ANXIOUS: 0
DIZZINESS: 0
FEVER: 0
CONSTIPATION: 0
EYE PAIN: 0
WEAKNESS: 0
DIARRHEA: 0
HEARTBURN: 0
BREAST MASS: 0
JOINT SWELLING: 1
FREQUENCY: 0
SORE THROAT: 0
NAUSEA: 0
HEMATURIA: 0
MYALGIAS: 0
PARESTHESIAS: 0
PALPITATIONS: 0
CHILLS: 0
DYSURIA: 0
ABDOMINAL PAIN: 0
HEADACHES: 0
ARTHRALGIAS: 1
COUGH: 0
HEMATOCHEZIA: 0

## 2023-04-12 ASSESSMENT — PAIN SCALES - GENERAL: PAINLEVEL: NO PAIN (0)

## 2023-04-12 NOTE — PROGRESS NOTES
SUBJECTIVE:   CC: Jerri is an 57 year old who presents for preventive health visit.       4/12/2023     2:27 PM   Additional Questions   Roomed by lucy   Accompanied by self   Patient has been advised of split billing requirements and indicates understanding: Yes  Healthy Habits:     Getting at least 3 servings of Calcium per day:  Yes    Bi-annual eye exam:  Yes    Dental care twice a year:  Yes    Sleep apnea or symptoms of sleep apnea:  Excessive snoring    Diet:  Regular (no restrictions)    Frequency of exercise:  None    Taking medications regularly:  Yes    Medication side effects:  Other    PHQ-2 Total Score: 0    Additional concerns today:  Yes    Bilateral knee pain:  -progressive increase in pain  -joined a gym for a short time, during an exercise injured the knees  -intermittent  -Worse going down stairs    Pain in the right hand:  Plays the cello   Numbness in right hand  Ibuprofen+ 1-2 times per week     Mood:  -stable  -no thoughts of self harm   -tolerating medication without side effects     Today's PHQ-2 Score:       4/11/2023     3:55 PM   PHQ-2 ( 1999 [employer])   Q1: Little interest or pleasure in doing things 0   Q2: Feeling down, depressed or hopeless 0   PHQ-2 Score 0   Q1: Little interest or pleasure in doing things Not at all   Q2: Feeling down, depressed or hopeless Not at all   PHQ-2 Score 0       Have you ever done Advance Care Planning? (For example, a Health Directive, POLST, or a discussion with a medical provider or your loved ones about your wishes): No, advance care planning information given to patient to review.  Patient plans to discuss their wishes with loved ones or provider.      Social History     Tobacco Use     Smoking status: Never     Smokeless tobacco: Never   Vaping Use     Vaping status: Not on file   Substance Use Topics     Alcohol use: Yes     Comment: Social             4/11/2023     3:54 PM   Alcohol Use   Prescreen: >3 drinks/day or >7 drinks/week? No      Reviewed orders with patient.  Reviewed health maintenance and updated orders accordingly - Yes  Lab work is in process  Labs reviewed in EPIC  BP Readings from Last 3 Encounters:   23 121/81   21 139/86   20 (!) 140/78    Wt Readings from Last 3 Encounters:   23 110.1 kg (242 lb 12.8 oz)   21 103.4 kg (228 lb)   19 103.1 kg (227 lb 6.4 oz)                  Patient Active Problem List   Diagnosis     HTN (hypertension), benign     CARDIOVASCULAR SCREENING; LDL GOAL LESS THAN 160     Hypertension goal BP (blood pressure) < 140/90     Vitamin D deficiency     Pruritus     Hyperphoria     Lesion of external ear     Classic migraine with aura     Urinary incontinence, unspecified incontinence type     Functional urinary incontinence     Atopic dermatitis, unspecified type     Atypical ductal hyperplasia of left breast     Mild aortic insufficiency     Non-rheumatic mitral regurgitation     Ductal carcinoma in situ (DCIS) of left breast     Morbid obesity (H)     Adjustment disorder with depressed mood     Nuclear sclerosis of both eyes     Cervical high risk HPV (human papillomavirus) test positive     Impaired fasting glucose     Past Surgical History:   Procedure Laterality Date     BIOPSY BREAST NEEDLE LOCALIZATION Left 2016    Procedure: BIOPSY BREAST NEEDLE LOCALIZATION;  Surgeon: Livia Frederick MD;  Location: [facility] OR     BREAST BIOPSY, CORE RT/LT Left 10/12/2016     COLONOSCOPY WITH CO2 INSUFFLATION N/A 2017    Procedure: COLONOSCOPY WITH CO2 INSUFFLATION;  BMI 36.11  Colon cancer screening  Referring Provider-Indira Aldrich  Pharmacy [facility]  Fax [phone]  ;  Surgeon: Duane, William Charles, MD;  Location: [facility] OR     ZZC  DELIVERY ONLY  x2       Social History     Tobacco Use     Smoking status: Never     Smokeless tobacco: Never   Vaping Use     Vaping status: Not on file   Substance Use Topics     Alcohol use: Yes     Comment:  Social     Family History   Problem Relation Age of Onset     C.A.D. Mother      Skin Cancer Mother      C.A.D. Father      Cancer Father 40        Melanoma     Other Cancer Father         skin     Cerebrovascular Disease Maternal Grandmother      Hypertension No family hx of          Current Outpatient Medications   Medication Sig Dispense Refill     atorvastatin (LIPITOR) 20 MG tablet TAKE 1 TABLET (20 MG) BY MOUTH DAILY FOR CHOLESTEROL 90 tablet 3     azelastine (OPTIVAR) 0.05 % ophthalmic solution Place 1 drop into both eyes 2 times daily 6 mL 11     buPROPion (WELLBUTRIN XL) 150 MG 24 hr tablet Take 1 tablet (150 mg) by mouth every morning 90 tablet 1     Coenzyme Q10 (COQ-10 PO) Take 2 tablets by mouth        losartan-hydrochlorothiazide (HYZAAR) 100-25 MG tablet Take 1 tablet by mouth daily 90 tablet 3     Multiple Vitamins-Minerals (MULTIVITAMIN ADULTS PO) Take by mouth daily       Omega-3 Fatty Acids (OMEGA-3 FISH OIL PO) Take 2 g by mouth daily        omeprazole (PRILOSEC) 40 MG DR capsule Take 1 capsule (40 mg) by mouth daily Take 30-60 minutes before a meal. 90 capsule 1     tobramycin-dexamethasone (TOBRADEX) 0.3-0.1 % ophthalmic ointment Maxitrol ointment  ok substitute  same sig Apply to eyelids- 3 x day for 1 week. 3.5 g 0     triamcinolone (KENALOG) 0.1 % external cream APPLY SPARINGLY TO AFFECTED AREA TWO TIMES DAILY FOR 10-14 DAYS. 30 g 1     vitamin D3 (CHOLECALCIFEROL) 1.25 MG (00981 UT) capsule Take 1 capsule (50,000 Units) by mouth every 7 days for 8 doses 8 capsule 0     Allergies   Allergen Reactions     Cantaloupe [Melon]      Scratchy throat     Shrimp Nausea     Walnuts [Nuts]      Scratchy throat       Breast Cancer Screenin/11/2023     3:55 PM   Breast CA Risk Assessment (FHS-7)   Do you have a family history of breast, colon, or ovarian cancer? No / Unknown         Mammogram Screening: Recommended mammography every 1-2 years with patient discussion and risk factor  consideration  Pertinent mammograms are reviewed under the imaging tab.    History of abnormal Pap smear: YES - updated in Problem List and Health Maintenance accordingly      Latest Ref Rng & Units 2021     8:50 AM 2021     8:45 AM 11/15/2019     3:30 PM   PAP / HPV   PAP (Historical)   NIL      HPV 16 DNA NEG^Negative Negative    Negative     HPV 18 DNA NEG^Negative Negative    Negative     Other HR HPV NEG^Negative Negative    Positive       Reviewed and updated as needed this visit by clinical staff   Tobacco  Allergies  Meds  Problems  Med Hx  Surg Hx  Fam Hx          Reviewed and updated as needed this visit by Provider   Tobacco  Allergies  Meds  Problems  Med Hx  Surg Hx  Fam Hx         Past Medical History:   Diagnosis Date     Aortic insufficiency 10/16/2009    Trace - per ECHO     Breast cancer (H)      Cervical high risk HPV (human papillomavirus) test positive 10/26/2018, 11/15/19    See problem list     DCIS (ductal carcinoma in situ) 2016    Left breast     Heart murmur      HTN (hypertension), benign      Hyperphoria 2013     Kidney stones      Migraines      Mitral insufficiency 10/16/2009    Mild - per ECHO     Motion sickness      Seasonal allergic rhinitis      Uncomplicated asthma       Past Surgical History:   Procedure Laterality Date     BIOPSY BREAST NEEDLE LOCALIZATION Left 2016    Procedure: BIOPSY BREAST NEEDLE LOCALIZATION;  Surgeon: Livia Frederick MD;  Location: [facility]     BREAST BIOPSY, CORE RT/LT Left 10/12/2016     COLONOSCOPY WITH CO2 INSUFFLATION N/A 2017    Procedure: COLONOSCOPY WITH CO2 INSUFFLATION;  BMI 36.11  Colon cancer screening  Referring Provider-Indira Aldrich  Pharmacy [facility]  Fax [phone]  ;  Surgeon: Duane, William Charles, MD;  Location: [facility] OR     ZZC  DELIVERY ONLY  x2       Review of Systems   Constitutional: Negative for chills and fever.   HENT: Negative for congestion, ear pain,  "hearing loss and sore throat.    Eyes: Negative for pain and visual disturbance.   Respiratory: Positive for shortness of breath. Negative for cough.    Cardiovascular: Negative for chest pain, palpitations and peripheral edema.   Gastrointestinal: Negative for abdominal pain, constipation, diarrhea, heartburn, hematochezia and nausea.   Breasts:  Negative for tenderness, breast mass and discharge.   Genitourinary: Negative for dysuria, frequency, genital sores, hematuria, pelvic pain, urgency, vaginal bleeding and vaginal discharge.   Musculoskeletal: Positive for arthralgias and joint swelling. Negative for myalgias.   Skin: Negative for rash.   Neurological: Negative for dizziness, weakness, headaches and paresthesias.   Psychiatric/Behavioral: Negative for mood changes. The patient is not nervous/anxious.           OBJECTIVE:   /81 (BP Location: Left arm, Patient Position: Sitting, Cuff Size: Adult Large)   Pulse 66   Temp 97.9  F (36.6  C) (Tympanic)   Resp 18   Ht 1.549 m (5' 1\")   Wt 110.1 kg (242 lb 12.8 oz)   LMP 02/01/2017 (Approximate)   SpO2 98%   BMI 45.88 kg/m    Physical Exam  GENERAL APPEARANCE: healthy, alert and no distress  EYES: Eyes grossly normal to inspection and conjunctivae and sclerae normal  HENT: Intact TMs with no erythema   NECK: no adenopathy and trachea midline and normal to palpation  RESP: lungs clear to auscultation - no rales, rhonchi or wheezes  CV: regular rates and rhythm, normal S1 S2, no S3 or S4 and no murmur, click or rub  ABDOMEN: soft, non-tender and no rebound or guarding   MS: extremities normal- no gross deformities noted and peripheral pulses normal  SKIN: no suspicious lesions or rashes  NEURO: Normal strength and tone, mentation intact and speech normal  PSYCH: mentation appears normal  Knees: Inspection does not show any gross deformities.  No edema, no erythema, no skin changes, no rash.  No tibial tuberosity tenderness, no tenderness along medial, " and lateral joint lines.  No pes anserine bursitis.  No popliteal tenderness.  Full range of motion.  No quadriceps atrophy.  Strength is 5/5.  Neurovascular intact.  Anterior and posterior drawer signs are negative.  Varus and valgus stress negative.  Zeynep test negative.    Right wrist: strength and range of motion intact, no atrophy, neurovascular intact, Tinel+       Diagnostic Test Results:      Lab on 04/07/2023   Component Date Value Ref Range Status     Hold Specimen 04/07/2023 [facility]   Final     Hold Specimen 04/07/2023 [facility]   Final     Hold Specimen 04/07/2023 [facility]   Final     Hold Specimen 04/07/2023 [facility]   Final     Sodium 04/07/2023 137  133 - 144 mmol/L Final     Potassium 04/07/2023 3.3 (L)  3.4 - 5.3 mmol/L Final     Chloride 04/07/2023 103  94 - 109 mmol/L Final     Carbon Dioxide (CO2) 04/07/2023 29  20 - 32 mmol/L Final     Anion Gap 04/07/2023 5  3 - 14 mmol/L Final     Urea Nitrogen 04/07/2023 18  7 - 30 mg/dL Final     Creatinine 04/07/2023 0.74  0.52 - 1.04 mg/dL Final     Calcium 04/07/2023 9.2  8.5 - 10.1 mg/dL Final     Glucose 04/07/2023 118 (H)  70 - 99 mg/dL Final     GFR Estimate 04/07/2023 >90  >60 mL/min/1.73m2 Final    eGFR calculated using 2021 CKD-EPI equation.     Cholesterol 04/07/2023 174  <200 mg/dL Final     Triglycerides 04/07/2023 140  <150 mg/dL Final     Direct Measure HDL 04/07/2023 62  >=50 mg/dL Final     LDL Cholesterol Calculated 04/07/2023 84  <=100 mg/dL Final     Non HDL Cholesterol 04/07/2023 112  <130 mg/dL Final     Patient Fasting > 8hrs? 04/07/2023 Yes   Final     Hemoglobin A1C 04/07/2023 6.1 (H)  0.0 - 5.6 % Final    Normal <5.7%   Prediabetes 5.7-6.4%    Diabetes 6.5% or higher     Note: Adopted from ADA consensus guidelines.     Vitamin D, Total (25-Hydroxy) 04/07/2023 16 (L)  20 - 75 ug/L Final       Labs reviewed in Epic  Results for orders placed or performed in visit on 04/12/23   XR Knee Standing Bilateral 3 Views     Status: None    Narrative    XR KNEE  STANDING BILATERAL 3 VIEWS 4/12/2023 3:33 PM     HISTORY: Chronic pain of both knees; Chronic pain of both knees;  Chronic pain of both knees    COMPARISON: None.         Impression    IMPRESSION: Degenerative narrowing medial compartment of both knees.  Both knees negative for fracture. No significant effusion. Mild  degenerative change patellofemoral compartment both knees.    CAROL GARCIA MD         SYSTEM ID:  [id number]       ASSESSMENT/PLAN:   Rizwana was seen today for physical.    Diagnoses and all orders for this visit:    Routine general medical examination at a health care facility  -     REVIEW OF HEALTH MAINTENANCE PROTOCOL ORDERS  -preventive guidelines reviewed  -mammogram done 2/17/23  -colonoscopy done 4/2017   -will check with insurance to see if Shingrix is covered     Hyperlipidemia LDL goal <130  -continue statin  -had concerns that statin may be causing the knee pain and wrist pain, discussed low risk since statins would cause more muscle rather than joint pains  -added CK total to labs drawn previously  -can trial be off the statin for 2 weeks and see if any improvements in joint pains     LDL Cholesterol Calculated   Date Value Ref Range Status   04/07/2023 84 <=100 mg/dL Final   02/12/2021 150 (H) <100 mg/dL Final     Comment:     Above desirable:  100-129 mg/dl  Borderline High:  130-159 mg/dL  High:             160-189 mg/dL  Very high:       >189 mg/dl         Vitamin D deficiency  -     vitamin D3 (CHOLECALCIFEROL) 1.25 MG (77262 UT) capsule; Take 1 capsule (50,000 Units) by mouth every 7 days for 8 doses  -levels low at 16     Impaired fasting glucose  Lab Results   Component Value Date    A1C 6.1 04/07/2023    A1C 6.1 02/24/2022    A1C 5.8 02/12/2021       Morbid obesity (H)  -     Adult Comprehensive Weight Management  Referral; Future  -referral to Weight clinic provided   Wt Readings from Last 2 Encounters:   04/12/23 110.1 kg (242 lb 12.8 oz)   02/12/21 103.4 kg (228 lb)  "      Adjustment disorder with depressed mood  -     buPROPion (WELLBUTRIN XL) 150 MG 24 hr tablet; Take 1 tablet (150 mg) by mouth every morning  -stable  -continue current medication, refills provided     Chronic pain of both knees  -     XR Knee Standing Bilateral 3 Views; Future  -     CK total; Future  -X rays are showing mild degenerative changes, discussed conservative measures such as Acetaminophen, topical Voltaren, weight loss. If not better then will need referral to Orthopedics to discuss cortisone injections      Right wrist pain  -differentials include carpal tunnel, tendonitis.  -discussed using neutral wrist splints at night  -if not better then refer to HAND    LPRD (laryngopharyngeal reflux disease)  -     omeprazole (PRILOSEC) 40 MG DR capsule; Take 1 capsule (40 mg) by mouth daily Take 30-60 minutes before a meal.  -discussed risk of bone loss with long term use of proton pump inhibitor     Change in voice  -     omeprazole (PRILOSEC) 40 MG DR capsule; Take 1 capsule (40 mg) by mouth daily Take 30-60 minutes before a meal.    Hypertension goal BP (blood pressure) < 140/90  -     losartan-hydrochlorothiazide (HYZAAR) 100-25 MG tablet; Take 1 tablet by mouth daily  -at goal  -continue current medication       COUNSELING:  Reviewed preventive health counseling, as reflected in patient instructions       Regular exercise       Healthy diet/nutrition       Colorectal Cancer Screening      BMI:   Estimated body mass index is 45.88 kg/m  as calculated from the following:    Height as of this encounter: 1.549 m (5' 1\").    Weight as of this encounter: 110.1 kg (242 lb 12.8 oz).   Weight management plan: Patient referred to endocrine and/or weight management specialty      She reports that she has never smoked. She has never used smokeless tobacco.          Kristine Marie MD MPH    [facility]  "

## 2023-04-12 NOTE — PATIENT INSTRUCTIONS
Referral Details    Referred By  Referred To   Kristine Maire MD   [id number]   [place] [address]   Phone: [phone]   Fax: [phone]    Diagnoses: Morbid obesity (H)   Order: Adult Comprehensive Weight Management  Referral       Comment: Please be aware that coverage of these services is subject to the terms and limitations of your health insurance plan.  Call member services at your health plan with any benefit or coverage questions.   [facility] [place] will call you to coordinate your care as prescribed by the provider.  If you don t hear from a representative within 2 business days, please call [phone].     Preventive Health Recommendations  Female Ages 50 - 64    Yearly exam: See your health care provider every year in order to  Review health changes.   Discuss preventive care.    Review your medicines if your doctor has prescribed any.    Get a Pap test every three years (unless you have an abnormal result and your provider advises testing more often).  If you get Pap tests with HPV test, you only need to test every 5 years, unless you have an abnormal result.   You do not need a Pap test if your uterus was removed (hysterectomy) and you have not had cancer.  You should be tested each year for STDs (sexually transmitted diseases) if you're at risk.   Have a mammogram every 1 to 2 years.  Have a colonoscopy at age 50, or have a yearly FIT test (stool test). These exams screen for colon cancer.    Have a cholesterol test every 5 years, or more often if advised.  Have a diabetes test (fasting glucose) every three years. If you are at risk for diabetes, you should have this test more often.   If you are at risk for osteoporosis (brittle bone disease), think about having a bone density scan (DEXA).    Shots: Get a flu shot each year. Get a tetanus shot every 10 years.    Nutrition:   Eat at least 5 servings of fruits and vegetables each day.  Eat whole-grain bread, whole-wheat  pasta and brown rice instead of white grains and rice.  Get adequate Calcium and Vitamin D.     Lifestyle  Exercise at least 150 minutes a week (30 minutes a day, 5 days a week). This will help you control your weight and prevent disease.  Limit alcohol to one drink per day.  No smoking.   Wear sunscreen to prevent skin cancer.   See your dentist every six months for an exam and cleaning.  See your eye doctor every 1 to 2 years.

## 2023-04-12 NOTE — RESULT ENCOUNTER NOTE
Rizwana,     Arthritis changes seen in both knees. These changes are not severe.  If pain persists then I would recommend seeing the Orthopedics specialist.   Meantime, can use Acetaminophen over the counter, or Voltaren gel over the counter.     Please do not hesitate to call us at [phone] if you have any questions or concerns.    Thank you,    Kristine Marie MD MPH

## 2023-04-13 LAB — CK SERPL-CCNC: 160 U/L (ref 30–225)

## 2023-04-13 NOTE — RESULT ENCOUNTER NOTE
Rizwana,     The muscle breakdown blood test is not elevated. It seems less likely that the joint pains would be from the cholesterol medication.     Please do not hesitate to call us at [phone] if you have any questions or concerns.    Thank you,    Kristine Marie MD MPH

## 2023-08-25 NOTE — TELEPHONE ENCOUNTER
2012 NIL pap, Neg HPV.  2015 NIL pap.  10/26/18 NIL pap, + HR HPV (not 16 or 18). Plan cotest in one year.   11/15/19 NIL pap, + HR HPV (not 16 or 18). Plan colp.   12/23/19 [place] without bx. Plan: Cotest in 1 yr.  12/18/20 Reminder     
Patient due for Pap and HPV.    Reminder done today via [employer].    
Patient due for pap and HPV  Reminder MyChart done today.     
show

## 2023-10-06 ENCOUNTER — MYC MEDICAL ADVICE (OUTPATIENT)
Dept: FAMILY MEDICINE | Facility: CLINIC | Age: 58
End: 2023-10-06
Payer: COMMERCIAL

## 2023-10-19 ENCOUNTER — VIRTUAL VISIT (OUTPATIENT)
Dept: FAMILY MEDICINE | Facility: CLINIC | Age: 58
End: 2023-10-19
Payer: COMMERCIAL

## 2023-10-19 DIAGNOSIS — M25.561 CHRONIC PAIN OF BOTH KNEES: Primary | ICD-10-CM

## 2023-10-19 DIAGNOSIS — G89.29 CHRONIC PAIN OF BOTH KNEES: Primary | ICD-10-CM

## 2023-10-19 DIAGNOSIS — M25.562 CHRONIC PAIN OF BOTH KNEES: Primary | ICD-10-CM

## 2023-10-19 DIAGNOSIS — E78.5 HYPERLIPIDEMIA LDL GOAL <130: ICD-10-CM

## 2023-10-19 PROCEDURE — 99213 OFFICE O/P EST LOW 20 MIN: CPT | Mod: VID | Performed by: PREVENTIVE MEDICINE

## 2023-10-19 NOTE — PATIENT INSTRUCTIONS
Referral Details    Referred By  Referred To   Kristine Marie MD   [address]   [facility] [address]   Phone: [phone]   Fax: [phone]    Diagnoses: Chronic pain of both knees   Order: Physical Therapy Referral       Comment: Please be aware that coverage of these services is subject to the terms and limitations of your health insurance plan.  Call member services at your health plan with any benefit or coverage questions.   If you have not heard from the scheduling office within 2 business days, please call [phone] for [employer], [phone] for [employer] and [phone] for [employer].        Kristine Marie MD   [address] [facility] [address]   Phone: [phone]   Fax: [phone]    Diagnoses: Chronic pain of both knees   Order: Orthopedic  Referral       Comment: Please be aware that coverage of these services is subject to the terms and limitations of your health insurance plan.  Call member services at your health plan with any benefit or coverage questions.   Someone will call you

## 2023-10-19 NOTE — PROGRESS NOTES
"Jerri is a 57 year old who is being evaluated via a billable video visit.      How would you like to obtain your AVS? MyChart  If the video visit is dropped, the invitation should be resent by: Send to e-mail at: [email or web address]  Will anyone else be joining your video visit? No          Assessment & Plan     Chronic pain of both knees  -Imaging done 4/23 had shown some degenerative changes  -Differentials considered include osteoarthritis, patellofemoral syndrome  -start physical therapy and referral to Orthopedics provided  -if symptoms are not improving in 4-6 weeks can consider advanced imaging   - Physical Therapy Referral  - Orthopedic  Referral    Hyperlipidemia LDL goal <130  -less likely that knee pain is secondary to statin myopathy  -OK to hold statin for 2-3 months   -CPK was normal       LDL Cholesterol Calculated   Date Value Ref Range Status   04/07/2023 84 <=100 mg/dL Final   02/12/2021 150 (H) <100 mg/dL Final     Comment:     Above desirable:  100-129 mg/dl  Borderline High:  130-159 mg/dL  High:             160-189 mg/dL  Very high:       >189 mg/dl         20 minutes spent by me on the date of the encounter doing chart review, history and exam, documentation and further activities per the note       BMI:   Estimated body mass index is 45.88 kg/m  as calculated from the following:    Height as of 4/12/23: 1.549 m (5' 1\").    Weight as of 4/12/23: 110.1 kg (242 lb 12.8 oz).       Kristine Marie MD MPH    [facility]    Hernan Guthrie is a 57 year old, presenting for the following health issues:  Lipids and Knee Pain        10/19/2023     8:28 AM   Additional Questions   Roomed by Trisha       History of Present Illness       Hyperlipidemia:  She presents for follow up of hyperlipidemia.   She is not taking medication to lower cholesterol. She is having myalgia or other side effects to statin medications.    Reason for visit:  1) would like to try a different " cholesterol lowering medication besides the statin. 2) knees worse + injury - referral to ortho wanted    She eats 4 or more servings of fruits and vegetables daily.She consumes 0 sweetened beverage(s) daily.She exercises with enough effort to increase her heart rate 9 or less minutes per day.  She exercises with enough effort to increase her heart rate 3 or less days per week.   She is taking medications regularly.     More pain on the right knee  Some swelling  Pain with climbing up and down stairs  No redness  No falls  Pain severe while going downstairs is the worst  Daily pain and has to use Acetaminophen or Ibuprofen  Question about if the pain could be related to statin medication   Stopped statin for a short time and noted slight improvement but not resolution of symptoms       Review of Systems   Constitutional, HEENT, cardiovascular, pulmonary, gi and gu systems are negative, except as otherwise noted.      Objective           Vitals:  No vitals were obtained today due to virtual visit.    Physical Exam   GENERAL: Healthy, alert and no distress  EYES: Eyes grossly normal to inspection.  No discharge or erythema, or obvious scleral/conjunctival abnormalities.  RESP: No audible wheeze, cough, or visible cyanosis.  No visible retractions or increased work of breathing.    SKIN: Visible skin clear. No significant rash, abnormal pigmentation or lesions.  NEURO: Cranial nerves grossly intact.  Mentation and speech appropriate for age.  PSYCH: Mentation appears normal, affect normal/bright, judgement and insight intact, normal speech    No results found for this or any previous visit (from the past 24 hour(s)).    XR KNEE STANDING BILATERAL 3 VIEWS 4/12/2023 3:33 PM      HISTORY: Chronic pain of both knees; Chronic pain of both knees;  Chronic pain of both knees     COMPARISON: None.                                                                          IMPRESSION: Degenerative narrowing medial compartment of  both knees.  Both knees negative for fracture. No significant effusion. Mild  degenerative change patellofemoral compartment both knees.     CAROL GARCIA MD                     Video-Visit Details    Type of service:  Video Visit     Originating Location (pt. Location): Home    Distant Location (provider location):  Off-site  Platform used for Video Visit: [employer]

## 2023-10-23 NOTE — TELEPHONE ENCOUNTER
DIAGNOSIS: Chronic pain of both knees    APPOINTMENT DATE: 11/04/2023   NOTES STATUS DETAILS   OFFICE NOTE from referring provider Internal 10/19/2023 Dr Marie [facility]    OFFICE NOTE from other specialist N/A    DISCHARGE SUMMARY from hospital N/A    DISCHARGE REPORT from the ER N/A    OPERATIVE REPORT N/A    EMG report N/A    MEDICATION LIST N/A    MRI N/A    DEXA (osteoporosis/bone health) N/A    CT SCAN N/A    XRAYS (IMAGES & REPORTS) Internal 04/12/2023 bilateral knee

## 2023-11-01 ASSESSMENT — ACTIVITIES OF DAILY LIVING (ADL)
KNEE_ACTIVITY_OF_DAILY_LIVING_SCORE: 57.14
AS_A_RESULT_OF_YOUR_KNEE_INJURY,_HOW_WOULD_YOU_RATE_YOUR_CURRENT_LEVEL_OF_DAILY_ACTIVITY?: ABNORMAL
SQUAT: ACTIVITY IS NOT DIFFICULT
SIT WITH YOUR KNEE BENT: ACTIVITY IS NOT DIFFICULT
HOW_WOULD_YOU_RATE_THE_OVERALL_FUNCTION_OF_YOUR_KNEE_DURING_YOUR_USUAL_DAILY_ACTIVITIES?: ABNORMAL
GO UP STAIRS: ACTIVITY IS FAIRLY DIFFICULT
PAIN: THE SYMPTOM AFFECTS MY ACTIVITY SEVERELY
KNEEL ON THE FRONT OF YOUR KNEE: ACTIVITY IS MINIMALLY DIFFICULT
WALK: ACTIVITY IS FAIRLY DIFFICULT
WEAKNESS: THE SYMPTOM AFFECTS MY ACTIVITY SLIGHTLY
STAND: ACTIVITY IS SOMEWHAT DIFFICULT
HOW_WOULD_YOU_RATE_THE_CURRENT_FUNCTION_OF_YOUR_KNEE_DURING_YOUR_USUAL_DAILY_ACTIVITIES_ON_A_SCALE_FROM_0_TO_100_WITH_100_BEING_YOUR_LEVEL_OF_KNEE_FUNCTION_PRIOR_TO_YOUR_INJURY_AND_0_BEING_THE_INABILITY_TO_PERFORM_ANY_OF_YOUR_USUAL_DAILY_ACTIVITIES?: 30
RAW_SCORE: 40
GO DOWN STAIRS: ACTIVITY IS VERY DIFFICULT
STIFFNESS: THE SYMPTOM AFFECTS MY ACTIVITY MODERATELY
KNEE_ACTIVITY_OF_DAILY_LIVING_SUM: 40
LIMPING: THE SYMPTOM AFFECTS MY ACTIVITY MODERATELY
RISE FROM A CHAIR: ACTIVITY IS MINIMALLY DIFFICULT
SWELLING: THE SYMPTOM AFFECTS MY ACTIVITY SLIGHTLY
GIVING WAY, BUCKLING OR SHIFTING OF KNEE: THE SYMPTOM AFFECTS MY ACTIVITY SLIGHTLY

## 2023-11-04 ENCOUNTER — PRE VISIT (OUTPATIENT)
Dept: ORTHOPEDICS | Facility: CLINIC | Age: 58
End: 2023-11-04

## 2023-11-04 ENCOUNTER — OFFICE VISIT (OUTPATIENT)
Dept: ORTHOPEDICS | Facility: CLINIC | Age: 58
End: 2023-11-04
Attending: PREVENTIVE MEDICINE
Payer: COMMERCIAL

## 2023-11-04 DIAGNOSIS — M17.0 BILATERAL PRIMARY OSTEOARTHRITIS OF KNEE: Primary | ICD-10-CM

## 2023-11-04 DIAGNOSIS — G89.29 CHRONIC PAIN OF BOTH KNEES: ICD-10-CM

## 2023-11-04 DIAGNOSIS — M25.562 CHRONIC PAIN OF BOTH KNEES: ICD-10-CM

## 2023-11-04 DIAGNOSIS — M25.561 CHRONIC PAIN OF BOTH KNEES: ICD-10-CM

## 2023-11-04 PROCEDURE — 20610 DRAIN/INJ JOINT/BURSA W/O US: CPT | Mod: 50 | Performed by: FAMILY MEDICINE

## 2023-11-04 PROCEDURE — 99203 OFFICE O/P NEW LOW 30 MIN: CPT | Mod: 25 | Performed by: FAMILY MEDICINE

## 2023-11-04 RX ORDER — TRIAMCINOLONE ACETONIDE 40 MG/ML
40 INJECTION, SUSPENSION INTRA-ARTICULAR; INTRAMUSCULAR
Status: SHIPPED | OUTPATIENT
Start: 2023-11-04

## 2023-11-04 RX ADMIN — TRIAMCINOLONE ACETONIDE 40 MG: 40 INJECTION, SUSPENSION INTRA-ARTICULAR; INTRAMUSCULAR at 09:21

## 2023-11-04 ASSESSMENT — PAIN SCALES - GENERAL: PAINLEVEL: MILD PAIN (3)

## 2023-11-04 NOTE — LETTER
11/4/2023         RE: Rizwana Aguirre  [address]  [facility] [address]        Dear Colleague,    Thank you for referring your patient, Rizwana Aguirre, to the [facility] [place]. Please see a copy of my visit note below.    CHIEF COMPLAINT:  Pain and New Patient of the Right Knee (Pain for 3 months) and Pain and New Patient of the Left Knee     HISTORY OF PRESENT ILLNESS  Ms. Aguirre is a pleasant 57 year old year old female who presents to clinic today with bilateral knee pain.  Rizwana explains that pain for months, worse when walking down stairs    Onset: gradual  Location: bilateral knee  Quality:  aching  Duration: 4 months   Severity: 3/10 at worst  Timing:worse since dropping luggage on right ankle in spt.  Modifying factors:  resting and non-use makes it better, movement and use makes it worse  Associated signs & symptoms: pain  Previous similar pain: No  Treatments to date:NA    Additional history: as documented    Review of Systems:  Have you recently had a a fever, chills, weight loss? No  Do you have any vision problems? No  Do you have any chest pain or edema? No  Do you have any shortness of breath or wheezing?  No  Do you have stomach problems? No  Do you have any numbness or focal weakness? A little in the right heel  Do you have diabetes? No  Do you have problems with bleeding or clotting? No  Do you have an rashes or other skin lesions? No    MEDICAL HISTORY  Patient Active Problem List   Diagnosis     HTN (hypertension), benign     CARDIOVASCULAR SCREENING; LDL GOAL LESS THAN 160     Hypertension goal BP (blood pressure) < 140/90     Vitamin D deficiency     Pruritus     Hyperphoria     Lesion of external ear     Classic migraine with aura     Urinary incontinence, unspecified incontinence type     Functional urinary incontinence     Atopic dermatitis, unspecified type     Atypical ductal hyperplasia of left breast     Mild aortic insufficiency      Non-rheumatic mitral regurgitation     Ductal carcinoma in situ (DCIS) of left breast     Morbid obesity (H)     Adjustment disorder with depressed mood     Nuclear sclerosis of both eyes     Cervical high risk HPV (human papillomavirus) test positive     Impaired fasting glucose       Current Outpatient Medications   Medication Sig Dispense Refill     azelastine (OPTIVAR) 0.05 % ophthalmic solution Place 1 drop into both eyes 2 times daily 6 mL 11     buPROPion (WELLBUTRIN XL) 150 MG 24 hr tablet TAKE 1 TABLET BY MOUTH EVERY MORNING 90 tablet 0     Coenzyme Q10 (COQ-10 PO) Take 2 tablets by mouth        losartan-hydrochlorothiazide (HYZAAR) 100-25 MG tablet Take 1 tablet by mouth daily 90 tablet 3     Multiple Vitamins-Minerals (MULTIVITAMIN ADULTS PO) Take by mouth daily       Omega-3 Fatty Acids (OMEGA-3 FISH OIL PO) Take 2 g by mouth daily        omeprazole (PRILOSEC) 40 MG DR capsule Take 1 capsule (40 mg) by mouth daily Take 30-60 minutes before a meal. (Patient taking differently: Take 40 mg by mouth daily Take 30-60 minutes before a meal. Taking every other day) 90 capsule 1     tobramycin-dexamethasone (TOBRADEX) 0.3-0.1 % ophthalmic ointment Maxitrol ointment  ok substitute  same sig Apply to eyelids- 3 x day for 1 week. 3.5 g 0     triamcinolone (KENALOG) 0.1 % external cream APPLY SPARINGLY TO AFFECTED AREA TWO TIMES DAILY FOR 10-14 DAYS. 30 g 1     atorvastatin (LIPITOR) 20 MG tablet TAKE 1 TABLET (20 MG) BY MOUTH DAILY FOR CHOLESTEROL (Patient not taking: Reported on 10/19/2023) 90 tablet 3       Allergies   Allergen Reactions     Cantaloupe [Melon]      Scratchy throat     Shrimp Nausea     Walnuts [Nuts]      Scratchy throat       Family History   Problem Relation Age of Onset     C.A.D. Mother      Skin Cancer Mother      C.A.D. Father      Cancer Father 40        Melanoma     Other Cancer Father         skin     Cerebrovascular Disease Maternal Grandmother      Hypertension No family hx of         Additional medical/Social/Surgical histories reviewed in [facility] and updated as appropriate.       PHYSICAL EXAM  LMP 02/01/2017 (Approximate)     General  - normal appearance, in no obvious distress  Musculoskeletal - bilateral knee  - stance: mildly antalgic gait  - inspection: trace effusion, mild quad atrophy  - palpation: Trace patellar facet tenderness. Right knee lateral joint line tenderness. No medial joint tenderness today  - ROM: 120 degrees flexion, 0 degrees extension  - strength: 5/5 in flexion, 4/5 in extension   - special tests:  (-) varus at 0 and 30 degrees flexion  (+) Pseudovalgus laxity due to joint narrowing, no pain  Neuro  - no sensory or motor deficit, grossly normal coordination, normal muscle tone      IMAGING : XR bilateral 3 view knees from 4/12/23. Final results and radiologist's interpretation, available in the [facility] health record. Images were reviewed with the patient/family members in the office today. My personal interpretation of the performed imaging is moderate medial compartment joint space narrowing. Minimal degenerative changes at patellofemoral joints     ASSESSMENT & PLAN  Ms. Aguirre is a 57 year old year old female who presents to clinic today with acute on chronic  bilateral knee pain relating to osteoarthritic changes of bilateral knees.    Diagnosis:   Chronic pain bilateral knees  Primary osteoarthritis of bilateral knees    Pathophysiology and treatment options discussed for osteoarthritis of knee.  We reviewed active weight management, Low impact exercise such as biking, swimming and elliptical is most beneficial.  Bracing options, injections including HA and corticosteroid.    We agreed to pursue corticosteroid injection today.  Will place order for formal physical therapy as well.  Jerri feels she can increase her activity with relief to achieve her weight loss goals; walking program.    Consider HA injection in 3-6 months PRN.  It was a pleasure seeing Rizwana  today.    PROCEDURE    Bilateral Knee Injection - Intraarticular  The patient was informed of the risks and the benefits of the procedure and a written consent was signed.  The patient s left knee was prepped with chlorhexidine in sterile fashion.   40 mg of triamcinolone suspension was drawn up into a 5 mL syringe with 4 mL of 1% lidocaine.  Injection was performed using substerile technique.  A 1.5-inch 22-gauge needle was used to enter the anterolateral aspect of the left knee.  Injection performed successfully without difficulty.  There were no complications. The patient tolerated the procedure well. There was negligible bleeding.   This procedure as above was repeated for the right knee.  The patient s right knee was prepped with chlorhexidine in sterile fashion. Injection was performed using substerile technique.  A 1.5-inch 22-gauge needle was used to enter the anterolateral aspect of the right knee.  Injection performed successfully without difficulty.  There were no complications. The patient tolerated the procedure well. There was negligible bleeding.   The patient was instructed to ice the knee upon leaving clinic and refrain from overuse over the next 3 days.   The patient was instructed to call or go to the emergency room with any unusual pain, swelling, redness, or if otherwise concerned.  A follow up appointment will be scheduled to evaluate response to the injection, and to assess range of motion and pain.   Rick Weeks DO, [facility]  Primary [facility] Medicine     Large Joint Injection/Arthocentesis: bilateral knee    Date/Time: 11/4/2023 9:21 AM    Performed by: Rick Weeks DO  Authorized by: Rick Weeks DO    Indications:  Pain  Needle Size:  22 G  Guidance: landmark guided    Approach:  Lateral  Location:  Knee  Laterality:  Bilateral      Medications (Right):  40 mg triamcinolone 40 MG/ML  Medications (Left):  40 mg triamcinolone 40 MG/ML  Outcome:  Tolerated well, no immediate  complications  Procedure discussed: discussed risks, benefits, and alternatives    Consent Given by:  Patient  Timeout: timeout called immediately prior to procedure    Prep: patient was prepped and draped in usual sterile fashion      25 minutes on date of the encounter performing chart review, history and examination, independent imaging review, documentation, and additional activities noted above explaining options.  In addition to encounter time, separate time of 14minutes utilized for procedure.  See procedure note for details.      Again, thank you for allowing me to participate in the care of your patient.        Sincerely,        Rick Weeks, DO

## 2023-11-04 NOTE — NURSING NOTE
Chief Complaint   Patient presents with    Right Knee - Pain, New Patient     Pain for 3 months    Left Knee - Pain, New Patient       There were no vitals filed for this visit.    There is no height or weight on file to calculate BMI.      YUDITH Maria NREMT

## 2023-11-04 NOTE — NURSING NOTE
[facility]   ORTHOPEDICS & SPORTS MEDICINE  [address] [place] [address]  Dept: [phone]  ______________________________________________________________________________    Patient: Rizwana Aguirre   : 1965   MRN: [id number]   2023    INVASIVE PROCEDURE SAFETY CHECKLIST    Date: 2023   Procedure: Bilateral knee pain  Patient Name: Rizwana Aguirre  MRN: [id number]  YOB: 1965    Action: Complete sections as appropriate. Any discrepancy results in a HARD COPY until resolved.     PRE PROCEDURE:  Patient ID verified with 2 identifiers (name and  or MRN): Yes  Procedure and site verified with patient/designee (when able): Yes  Accurate consent documentation in medical record: Yes  H&P (or appropriate assessment) documented in medical record: Yes  H&P must be up to 20 days prior to procedure and updates within 24 hours of procedure as applicable: NA  Relevant diagnostic and radiology test results appropriately labeled and displayed as applicable: NA  Procedure site(s) marked with provider initials: NA    TIMEOUT:  Time-Out performed immediately prior to starting procedure, including verbal and active participation of all team members addressing the following:Yes  * Correct patient identify  * Confirmed that the correct side and site are marked  * An accurate procedure consent form  * Agreement on the procedure to be done  * Correct patient position  * Relevant images and results are properly labeled and appropriately displayed  * The need to administer antibiotics or fluids for irrigation purposes during the procedure as applicable   * Safety precautions based on patient history or medication use    DURING PROCEDURE: Verification of correct person, site, and procedures any time the responsibility for care of the patient is transferred to another member of the care team.       Prior to injection, verified patient identity using patient's name and date of birth.  Due  to injection administration, patient instructed to remain in clinic for 15 minutes  afterwards, and to report any adverse reaction to me immediately.    Joint injection was performed.      Drug Amount Wasted:  None.  Vial/Syringe: Single dose vial  Expiration Date:  6/30/2025      Crow Lin, EMT  November 4, 2023

## 2023-11-04 NOTE — PATIENT INSTRUCTIONS
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 A wrist cock-up splint may be useful by slackening the wrist extensors (and thus decreasing pressure in the radial tunnel) (Jimi & Helena, 1998). Simon & Toni recommend beginning treatment with wrist/elbow splints, NSAIDs, and restriction of aggravating activities. Wrist flexion and pronation combined with elbow extension should be avoided, along with any position that aggravates symptoms. Due to the compressive nature of the pathology, the clinician should consider including soft tissue release work, myofascial release work and neural mobilization techniques (Salvador & Marquita, 2010).     Consider wrist brace - velcro from [facility] or [facility]    KNEE OSTEOARTHRITIS    WHAT IS KNEE OSTEOARTHRITIS?    Osteoarthritis is a disease in which the cartilage in your joints breaks down. Cartilage is tissue that lines and cushions the surface of joints. It covers the ends of bones and allows free movement of joints. If joint cartilage gets rough or wears away, the roughened cartilage or bone surfaces grind against each other. The joint gets irritated and swollen (inflamed). Sometimes the irritation causes abnormal growths of bone, called spurs.    Osteoarthritis most often affects joints in the feet, knees, hips, lower back, or fingers. It usually affects only one or a few joints at a time. It is a lifelong problem that can get worse over time. However, you can relieve symptoms and prevent or slow down joint damage by following your healthcare provider s treatment plan and taking care of yourself.    WHAT IS THE CAUSE?    The exact cause of osteoarthritis is not known. Things that may cause or contribute to osteoarthritis are:    Aging. Osteoarthritis slowly gets worse as you get older. Symptoms are  usually not noticed until middle age.  Too much wear on joints. Obesity, bad posture, and overuse can cause extra wear on joints.  Injuries to joints from sports or accidents  Pressure. Heavy lifting or contact sports can put pressure on joints and damage the cartilage.  Genes you have inherited. Genes are inside each cell of your body and are passed from parents to children. They contain the information that tells your body how to develop and work. A family history of osteoarthritis can double your risk of having osteoarthritis.    WHAT ARE THE SYMPTOMS?    Symptoms may include:    Mild to severe pain in a joint, which may be worse after long periods of bending, lifting or not moving  Creaking or grating sound in the joint  Swelling, stiffness, or limited movement of the joint, especially in the morning  Weakness in muscles around the sore joint from lack of use  Changes in the shape of the joint  The pain of osteoarthritis starts with activity and continues after you stop the activity. The stiffness of osteoarthritis wears off quickly (usually within 15 to 30 minutes) once you get moving in the morning.    HOW IS IT DIAGNOSED?    Your healthcare provider will ask about your symptoms and medical history and examine you. If there is a question about what type of arthritis you have or how severe your osteoarthritis is, other tests may include:    Blood tests  Joint aspiration, which uses a needle to take fluid from a joint for testing  X-rays  You may have other tests or scans to check for other possible causes of your symptoms.    HOW IS IT TREATED?    There is no cure for osteoarthritis, but treatment can help:    Relieve pain and stiffness  Reduce swelling  Keep the joints working properly  Stop or slow down damage to the joints  There are many ways to treat osteoarthritis.    Medicine    Nonprescription pain medicine, such as acetaminophen or nonsteroidal anti-inflammatory drugs (NSAIDs), may help relieve pain.  NSAIDs are available in pills, creams, and patches.    Acetaminophen may cause liver damage or other problems. Don t take more than 4000 milligrams (mg) in 24 hours. To make sure you don t take too much, check other medicines you take to see if they also contain acetaminophen. Unless recommended by your healthcare provider, you should not take this medicine for more than 10 days. Ask your provider if you need to avoid drinking alcohol while taking this medicine.    NSAIDs, such as ibuprofen, naproxen, ketoprofen, and aspirin, may cause stomach bleeding and other problems. These risks increase with age. Read the label and take as directed. Unless recommended by your healthcare provider, you should not take this medicine for more than 10 days.    Steroid medicine may be prescribed to decrease pain and swelling. It can be given as a pill, cream or ointment, or shot. Using a steroid for a long time can have serious side effects. Take steroid medicine exactly as your healthcare provider prescribes. Don t take more or less of it than prescribed by your provider and don t take it longer than prescribed. Don t stop taking a steroid without your provider's approval. You may have to lower your dosage slowly before stopping it.  Hyaluronic acid may be injected into your knee if you have arthritis in your knee. It helps your knee move more easily.    Exercise  Three types of exercise may help:    Range-of-motion exercises are gentle stretching exercises that help you move each joint as far as possible. Examples include low-speed bike riding, russell chi, and yoga. Range-of-motion exercises help you keep or improve your flexibility and relieve stiffness.  Strengthening exercise, such as weight training, makes muscles and tendons stronger. Strong muscles and tendons support joints better. You will be able to move more easily and with less pain.    Aerobic or endurance exercise at a moderate pace, such as walking or bicycle riding,  improves your overall health and helps control your weight. Exercise in a warm swimming pool is a very helpful option. The water supports your weight while you move, and the warmth helps improve joint movement.    Talk with your healthcare provider before you start an exercise program. Too much exercise too soon or even at the wrong time of day may make arthritis worse. Your provider may refer you to a physical therapist to design a program that is right for you.    Surgery    Your provider may advise arthroscopy, which is a type of surgery done with a small scope inserted into your joint. Your provider can look directly at your joint and sometimes make helpful repairs, such as removing bone spurs, without having to cut open the joint.    If you have a joint that is severely damaged, surgery may be done to replace your joint with an artificial joint.    Other treatments    Your healthcare provider may recommend physical or occupational therapy to treat pain and help you have better use of your joints.  Although the evidence is not conclusive, some people seem to benefit from the natural remedies glucosamine and chondroitin sulfate. Acupuncture may also help reduce pain and stiffness in the joints.  Transcutaneous electrical nerve stimulation (TENS) may relieve some types of arthritis pain. TENS directs mild electric pulses through the skin to nerves in the painful area.  Sometimes it may help to use a splint or brace to rest the joint and protect it from injury.  HOW CAN I TAKE CARE OF MYSELF?    Follow the full course of treatment prescribed by your healthcare provider.  Rest your joints when they are hot, swollen, or painful.    Learn how to move in ways that are easier on your joints. Be open to using devices to help you. Helpful devices may include canes and walkers; bath seats and grab bars for the bathtub; and larger  on tools, eating utensils, pens, and pencils. Velcro fasteners on clothes and shoes can  be very useful, too.    Rubbing deep-heat creams on a painful joint can give short-term relief. Putting an ice pack on the joint once or twice a day can also help relieve pain. See which works best for you. Some people get relief by alternating heat and cold packs. Hot paraffin baths can help symptoms in the hands and feet.    Eat a healthy diet. Ask your provider about the benefits of talking to a dietician to learn what you need in a healthy diet.    Try to keep a healthy weight. If you are overweight, lose weight. Losing some weight can reduce the stress on your joints.    If you smoke, try to quit. Talk to your healthcare provider about ways to quit smoking.    Try to get at least 7 to 9 hours of sleep each night.  Join a support group or take classes on how to manage your arthritis.    Ask your healthcare provider:  How and when you will hear your test results  What activities you should avoid and when you can return to normal activities  What symptoms or problems you should watch for and what to do if you have them  Make sure you know when you should come back for a checkup.    Developed by [employer].  Published by [employer].  Copyright  2014 [employer] and/or one of its subsidiaries. All rights reserved.

## 2023-11-04 NOTE — PROGRESS NOTES
CHIEF COMPLAINT:  Pain and New Patient of the Right Knee (Pain for 3 months) and Pain and New Patient of the Left Knee     HISTORY OF PRESENT ILLNESS  Ms. Aguirre is a pleasant 57 year old year old female who presents to clinic today with bilateral knee pain.  Rizwana explains that pain for months, worse when walking down stairs    Onset: gradual  Location: bilateral knee  Quality:  aching  Duration: 4 months   Severity: 3/10 at worst  Timing:worse since dropping luggage on right ankle in spt.  Modifying factors:  resting and non-use makes it better, movement and use makes it worse  Associated signs & symptoms: pain  Previous similar pain: No  Treatments to date:NA    Additional history: as documented    Review of Systems:  Have you recently had a a fever, chills, weight loss? No  Do you have any vision problems? No  Do you have any chest pain or edema? No  Do you have any shortness of breath or wheezing?  No  Do you have stomach problems? No  Do you have any numbness or focal weakness? A little in the right heel  Do you have diabetes? No  Do you have problems with bleeding or clotting? No  Do you have an rashes or other skin lesions? No    MEDICAL HISTORY  Patient Active Problem List   Diagnosis    HTN (hypertension), benign    CARDIOVASCULAR SCREENING; LDL GOAL LESS THAN 160    Hypertension goal BP (blood pressure) < 140/90    Vitamin D deficiency    Pruritus    Hyperphoria    Lesion of external ear    Classic migraine with aura    Urinary incontinence, unspecified incontinence type    Functional urinary incontinence    Atopic dermatitis, unspecified type    Atypical ductal hyperplasia of left breast    Mild aortic insufficiency    Non-rheumatic mitral regurgitation    Ductal carcinoma in situ (DCIS) of left breast    Morbid obesity (H)    Adjustment disorder with depressed mood    Nuclear sclerosis of both eyes    Cervical high risk HPV (human papillomavirus) test positive    Impaired fasting glucose       Current  Outpatient Medications   Medication Sig Dispense Refill    azelastine (OPTIVAR) 0.05 % ophthalmic solution Place 1 drop into both eyes 2 times daily 6 mL 11    buPROPion (WELLBUTRIN XL) 150 MG 24 hr tablet TAKE 1 TABLET BY MOUTH EVERY MORNING 90 tablet 0    Coenzyme Q10 (COQ-10 PO) Take 2 tablets by mouth       losartan-hydrochlorothiazide (HYZAAR) 100-25 MG tablet Take 1 tablet by mouth daily 90 tablet 3    Multiple Vitamins-Minerals (MULTIVITAMIN ADULTS PO) Take by mouth daily      Omega-3 Fatty Acids (OMEGA-3 FISH OIL PO) Take 2 g by mouth daily       omeprazole (PRILOSEC) 40 MG DR capsule Take 1 capsule (40 mg) by mouth daily Take 30-60 minutes before a meal. (Patient taking differently: Take 40 mg by mouth daily Take 30-60 minutes before a meal. Taking every other day) 90 capsule 1    tobramycin-dexamethasone (TOBRADEX) 0.3-0.1 % ophthalmic ointment Maxitrol ointment  ok substitute  same sig Apply to eyelids- 3 x day for 1 week. 3.5 g 0    triamcinolone (KENALOG) 0.1 % external cream APPLY SPARINGLY TO AFFECTED AREA TWO TIMES DAILY FOR 10-14 DAYS. 30 g 1    atorvastatin (LIPITOR) 20 MG tablet TAKE 1 TABLET (20 MG) BY MOUTH DAILY FOR CHOLESTEROL (Patient not taking: Reported on 10/19/2023) 90 tablet 3       Allergies   Allergen Reactions    Cantaloupe [Melon]      Scratchy throat    Shrimp Nausea    Walnuts [Nuts]      Scratchy throat       Family History   Problem Relation Age of Onset    C.A.D. Mother     Skin Cancer Mother     C.A.D. Father     Cancer Father 40        Melanoma    Other Cancer Father         skin    Cerebrovascular Disease Maternal Grandmother     Hypertension No family hx of        Additional medical/Social/Surgical histories reviewed in [facility] and updated as appropriate.       PHYSICAL EXAM  LMP 02/01/2017 (Approximate)     General  - normal appearance, in no obvious distress  Musculoskeletal - bilateral knee  - stance: mildly antalgic gait  - inspection: trace effusion, mild quad atrophy  -  palpation: Trace patellar facet tenderness. Right knee lateral joint line tenderness. No medial joint tenderness today  - ROM: 120 degrees flexion, 0 degrees extension  - strength: 5/5 in flexion, 4/5 in extension   - special tests:  (-) varus at 0 and 30 degrees flexion  (+) Pseudovalgus laxity due to joint narrowing, no pain  Neuro  - no sensory or motor deficit, grossly normal coordination, normal muscle tone      IMAGING : XR bilateral 3 view knees from 4/12/23. Final results and radiologist's interpretation, available in the [facility] health record. Images were reviewed with the patient/family members in the office today. My personal interpretation of the performed imaging is moderate medial compartment joint space narrowing. Minimal degenerative changes at patellofemoral joints     ASSESSMENT & PLAN  Ms. Aguirre is a 57 year old year old female who presents to clinic today with acute on chronic  bilateral knee pain relating to osteoarthritic changes of bilateral knees.    Diagnosis:   Chronic pain bilateral knees  Primary osteoarthritis of bilateral knees    Pathophysiology and treatment options discussed for osteoarthritis of knee.  We reviewed active weight management, Low impact exercise such as biking, swimming and elliptical is most beneficial.  Bracing options, injections including HA and corticosteroid.    We agreed to pursue corticosteroid injection today.  Will place order for formal physical therapy as well.  Jerri feels she can increase her activity with relief to achieve her weight loss goals; walking program.    Consider HA injection in 3-6 months PRN.  It was a pleasure seeing Rizwana today.    PROCEDURE    Bilateral Knee Injection - Intraarticular  The patient was informed of the risks and the benefits of the procedure and a written consent was signed.  The patient s left knee was prepped with chlorhexidine in sterile fashion.   40 mg of triamcinolone suspension was drawn up into a 5 mL syringe with 4 mL  of 1% lidocaine.  Injection was performed using substerile technique.  A 1.5-inch 22-gauge needle was used to enter the anterolateral aspect of the left knee.  Injection performed successfully without difficulty.  There were no complications. The patient tolerated the procedure well. There was negligible bleeding.   This procedure as above was repeated for the right knee.  The patient s right knee was prepped with chlorhexidine in sterile fashion. Injection was performed using substerile technique.  A 1.5-inch 22-gauge needle was used to enter the anterolateral aspect of the right knee.  Injection performed successfully without difficulty.  There were no complications. The patient tolerated the procedure well. There was negligible bleeding.   The patient was instructed to ice the knee upon leaving clinic and refrain from overuse over the next 3 days.   The patient was instructed to call or go to the emergency room with any unusual pain, swelling, redness, or if otherwise concerned.  A follow up appointment will be scheduled to evaluate response to the injection, and to assess range of motion and pain.   Rick Weeks DO, [facility]  Primary Care Sports Medicine     Large Joint Injection/Arthocentesis: bilateral knee    Date/Time: 11/4/2023 9:21 AM    Performed by: Rick Weeks DO  Authorized by: Rick Weeks DO    Indications:  Pain  Needle Size:  22 G  Guidance: landmark guided    Approach:  Lateral  Location:  Knee  Laterality:  Bilateral      Medications (Right):  40 mg triamcinolone 40 MG/ML  Medications (Left):  40 mg triamcinolone 40 MG/ML  Outcome:  Tolerated well, no immediate complications  Procedure discussed: discussed risks, benefits, and alternatives    Consent Given by:  Patient  Timeout: timeout called immediately prior to procedure    Prep: patient was prepped and draped in usual sterile fashion      25 minutes on date of the encounter performing chart review, history and examination, independent  imaging review, documentation, and additional activities noted above explaining options.  In addition to encounter time, separate time of 14minutes utilized for procedure.  See procedure note for details.

## 2023-11-08 ENCOUNTER — THERAPY VISIT (OUTPATIENT)
Dept: PHYSICAL THERAPY | Facility: CLINIC | Age: 58
End: 2023-11-08
Attending: PREVENTIVE MEDICINE
Payer: COMMERCIAL

## 2023-11-08 DIAGNOSIS — M25.562 CHRONIC PAIN OF BOTH KNEES: ICD-10-CM

## 2023-11-08 DIAGNOSIS — G89.29 CHRONIC PAIN OF BOTH KNEES: ICD-10-CM

## 2023-11-08 DIAGNOSIS — M25.561 CHRONIC PAIN OF BOTH KNEES: ICD-10-CM

## 2023-11-08 PROCEDURE — 97161 PT EVAL LOW COMPLEX 20 MIN: CPT | Mod: GP | Performed by: PHYSICAL THERAPIST

## 2023-11-08 PROCEDURE — 97110 THERAPEUTIC EXERCISES: CPT | Mod: GP | Performed by: PHYSICAL THERAPIST

## 2023-11-08 NOTE — PROGRESS NOTES
PHYSICAL THERAPY EVALUATION  Type of Visit: Evaluation    See electronic medical record for Abuse and Falls Screening details.    Subjective Patient reports the onset of bilateral knee pain starting in 2023 and she is unsure why. She reports no mechanism of injury or change in her daily routine. She reports having a steroid injection 4 days ago which has helped some. She reports no history of knee pain prior to 2023.     Patient reports the pain is across the joint line of both knees. Patient reports pain with walking and stairs. She reports some mild left hip pain that started recently and she feels is due to walking with a limp.           Presenting condition or subjective complaint: Knees hurt and I can't walk long distances. Also had an injury to my Achilles tendon which has impacted the knees as well.  Date of onset:      Relevant medical history: Arthritis; Asthma; Cancer; Depression; Incontinence; Menopause; Overweight; Radiation treatment   Dates & types of surgery: Two cesareans: 99 and 2001 and a biopsy breast needle localization 17, breast biopsy 10.12.16, colonoscopy 18    Prior diagnostic imaging/testing results: X-ray     Prior therapy history for the same diagnosis, illness or injury: No      Prior Level of Function  Transfers:   Ambulation:   ADL:   IADL:     Living Environment  Social support: With a significant other or spouse   Type of home: House; 2-story; Basement   Stairs to enter the home: Yes 1 Is there a railing: No   Ramp: No   Stairs inside the home: Yes 10 Is there a railing: Yes   Help at home:    Equipment owned:       Employment: Yes   Hobbies/Interests: Gardening, choir, orchestra    Patient goals for therapy: Exercise and go for walks    Pain assessment: Location: medial joint line of both knees/Ratin/10     Objective   KNEE EVALUATION  PAIN: Pain Level with Use: 6/10  INTEGUMENTARY (edema, incisions):   POSTURE: Standing Posture:  Rounded shoulders, Forward head  Sitting Posture: Rounded shoulders, Forward head  GAIT:  Weightbearing Status:   Assistive Device(s):   Gait Deviations: Stance time decreased  Stance time decreased on right lower extremity  BALANCE/PROPRIOCEPTION:   WEIGHTBEARING ALIGNMENT: WNL  NON-WEIGHTBEARING ALIGNMENT:   ROM: AROM WFL    STRENGTH:  grossly 4+/5 throughout bilateral lower extremities  FLEXIBILITY:   SPECIAL TESTS:    Left Right   Apley's (Meniscus) Negative  Negative    Zeynep's (Meniscus) Negative  Negative    Anyi's (ITB/TFL)     Patellar Apprehension Test Negative  Negative         Ligamentous Stability Negative  Negative    Anterior Drawer (ACL) Negative,   Negative,     Posterior Drawer (PCL) Negative,   Negative,          Valgus Stress Testing at 0 Deg and 30 Deg Negative,   Negative,     Varus Stress Testing at 0 Deg and 30 Deg  Negative,   Negative,     +Thessleys bilatearally    FUNCTIONAL TESTS:   PALPATION:  no tenderness reported with palpation  JOINT MOBILITY: WFL    Assessment & Plan   CLINICAL IMPRESSIONS  Medical Diagnosis: Bilateral knee pain    Treatment Diagnosis:     Impression/Assessment: Patient is a 57 year old female with bilateral knee pain complaints.  The following significant findings have been identified: Pain, Decreased strength, Impaired gait, and Decreased activity tolerance. These impairments interfere with their ability to perform self care tasks, work tasks, recreational activities, household chores, driving , household mobility, and community mobility as compared to previous level of function.     Clinical Decision Making (Complexity):  Clinical Presentation: Stable/Uncomplicated  Clinical Presentation Rationale: based on medical and personal factors listed in PT evaluation  Clinical Decision Making (Complexity): Low complexity    PLAN OF CARE  Treatment Interventions:  Interventions: Neuromuscular Re-education, Therapeutic Activity, Therapeutic Exercise    Long Term Goals             Frequency of Treatment:    Duration of Treatment:      Recommended Referrals to Other Professionals:   Education Assessment:        Risks and benefits of evaluation/treatment have been explained.   Patient/Family/caregiver agrees with Plan of Care.     Evaluation Time:             Signing Clinician: Fercho Eason PT

## 2023-11-22 ENCOUNTER — THERAPY VISIT (OUTPATIENT)
Dept: PHYSICAL THERAPY | Facility: CLINIC | Age: 58
End: 2023-11-22
Payer: COMMERCIAL

## 2023-11-22 DIAGNOSIS — M25.562 BILATERAL KNEE PAIN: Primary | ICD-10-CM

## 2023-11-22 DIAGNOSIS — M25.561 BILATERAL KNEE PAIN: Primary | ICD-10-CM

## 2023-11-22 PROCEDURE — 97110 THERAPEUTIC EXERCISES: CPT | Mod: GP | Performed by: PHYSICAL THERAPIST

## 2023-12-18 ENCOUNTER — THERAPY VISIT (OUTPATIENT)
Dept: PHYSICAL THERAPY | Facility: CLINIC | Age: 58
End: 2023-12-18
Payer: COMMERCIAL

## 2023-12-18 DIAGNOSIS — M25.561 CHRONIC PAIN OF BOTH KNEES: Primary | ICD-10-CM

## 2023-12-18 DIAGNOSIS — G89.29 CHRONIC PAIN OF BOTH KNEES: Primary | ICD-10-CM

## 2023-12-18 DIAGNOSIS — M25.562 CHRONIC PAIN OF BOTH KNEES: Primary | ICD-10-CM

## 2023-12-18 PROCEDURE — 97110 THERAPEUTIC EXERCISES: CPT | Mod: GP | Performed by: PHYSICAL THERAPIST

## 2023-12-18 PROCEDURE — 97140 MANUAL THERAPY 1/> REGIONS: CPT | Mod: GP | Performed by: PHYSICAL THERAPIST

## 2024-01-08 ENCOUNTER — THERAPY VISIT (OUTPATIENT)
Dept: PHYSICAL THERAPY | Facility: CLINIC | Age: 59
End: 2024-01-08
Payer: COMMERCIAL

## 2024-01-08 DIAGNOSIS — M25.561 CHRONIC PAIN OF BOTH KNEES: Primary | ICD-10-CM

## 2024-01-08 DIAGNOSIS — M25.562 CHRONIC PAIN OF BOTH KNEES: Primary | ICD-10-CM

## 2024-01-08 DIAGNOSIS — G89.29 CHRONIC PAIN OF BOTH KNEES: Primary | ICD-10-CM

## 2024-01-08 PROCEDURE — 97110 THERAPEUTIC EXERCISES: CPT | Mod: GP | Performed by: PHYSICAL THERAPIST

## 2024-01-08 PROCEDURE — 97112 NEUROMUSCULAR REEDUCATION: CPT | Mod: GP | Performed by: PHYSICAL THERAPIST

## 2024-01-17 DIAGNOSIS — E78.5 HYPERLIPIDEMIA LDL GOAL <130: ICD-10-CM

## 2024-01-17 RX ORDER — ATORVASTATIN CALCIUM 20 MG/1
20 TABLET, FILM COATED ORAL DAILY
Qty: 90 TABLET | Refills: 3 | Status: SHIPPED | OUTPATIENT
Start: 2024-01-17 | End: 2024-08-21

## 2024-01-18 ENCOUNTER — PATIENT OUTREACH (OUTPATIENT)
Dept: CARE COORDINATION | Facility: CLINIC | Age: 59
End: 2024-01-18
Payer: COMMERCIAL

## 2024-01-29 ENCOUNTER — THERAPY VISIT (OUTPATIENT)
Dept: PHYSICAL THERAPY | Facility: CLINIC | Age: 59
End: 2024-01-29
Payer: COMMERCIAL

## 2024-01-29 DIAGNOSIS — M25.561 CHRONIC PAIN OF BOTH KNEES: Primary | ICD-10-CM

## 2024-01-29 DIAGNOSIS — G89.29 CHRONIC PAIN OF BOTH KNEES: Primary | ICD-10-CM

## 2024-01-29 DIAGNOSIS — M25.562 CHRONIC PAIN OF BOTH KNEES: Primary | ICD-10-CM

## 2024-01-29 PROCEDURE — 97110 THERAPEUTIC EXERCISES: CPT | Mod: GP | Performed by: PHYSICAL THERAPIST

## 2024-01-29 PROCEDURE — 97140 MANUAL THERAPY 1/> REGIONS: CPT | Mod: GP | Performed by: PHYSICAL THERAPIST

## 2024-01-29 NOTE — PROGRESS NOTES
PROGRESS  REPORT    Progress reporting period is from 12.18.2023 to 11.29.2024.       SUBJECTIVE  Subjective changes noted by patient:  Patient reports an improvment in pain in her knee and calf since last PT session. She is able to tolerate more standing and is able to stand for >60 minutes without pain. She reports stairs are also getting easier and is walking longer with much less of a limp. .       Current pain level is 2/10  .     Previous pain level was  5/10  .   Changes in function:  Yes (See Goal flowsheet attached for changes in current functional level)  Adverse reaction to treatment or activity: None    OBJECTIVE  Changes noted in objective findings:  Yes,   Knee AROM: WNL throughout bilaterally and pain free  Knee strength grossly 4+/5 bilaterally  Hip strength grossly 4+/5 except abduction and external rotation4/5 bilaterally   Mild tenderness over the knee joint line bilaterally and right lateral gastroc  Fair pelvifemoral stability on right lower extremity. Good pelvifemoral stability on the left.       ASSESSMENT/PLAN  Updated problem list and treatment plan: Diagnosis 1:  chronic bilateral knee pain2  Pain -  self management, education, and home program  Decreased strength - therapeutic exercise, therapeutic activities, and home program  Decreased function - therapeutic activities, home program, and Neuromuscular re education  STG/LTGs have been met or progress has been made towards goals:  Yes (See Goal flow sheet completed today.)  Assessment of Progress: The patient's condition is improving.  Self Management Plans:  Patient has been instructed in a home treatment program.  Patient  has been instructed in self management of symptoms.  I have re-evaluated this patient and find that the nature, scope, duration and intensity of the therapy is appropriate for the medical condition of the patient.  Rizwana continues to require the following intervention to meet STG and LTG's:   PT    Recommendations:  This patient would benefit from continued therapy.     Frequency:  1 X a month, once daily  Duration:  for 1-2 months    Please refer to the daily flowsheet for treatment today, total treatment time and time spent performing 1:1 timed codes.

## 2024-01-30 DIAGNOSIS — F43.21 ADJUSTMENT DISORDER WITH DEPRESSED MOOD: ICD-10-CM

## 2024-01-30 RX ORDER — BUPROPION HYDROCHLORIDE 150 MG/1
150 TABLET ORAL EVERY MORNING
Qty: 90 TABLET | Refills: 0 | Status: SHIPPED | OUTPATIENT
Start: 2024-01-30 | End: 2024-04-12

## 2024-02-19 ENCOUNTER — OFFICE VISIT (OUTPATIENT)
Dept: ENDOCRINOLOGY | Facility: CLINIC | Age: 59
End: 2024-02-19
Payer: COMMERCIAL

## 2024-02-19 ENCOUNTER — TELEPHONE (OUTPATIENT)
Dept: ENDOCRINOLOGY | Facility: CLINIC | Age: 59
End: 2024-02-19

## 2024-02-19 VITALS
BODY MASS INDEX: 45.69 KG/M2 | DIASTOLIC BLOOD PRESSURE: 85 MMHG | WEIGHT: 242 LBS | HEIGHT: 61 IN | SYSTOLIC BLOOD PRESSURE: 142 MMHG | HEART RATE: 72 BPM | OXYGEN SATURATION: 96 %

## 2024-02-19 DIAGNOSIS — E66.01 MORBID OBESITY (H): ICD-10-CM

## 2024-02-19 PROCEDURE — 99204 OFFICE O/P NEW MOD 45 MIN: CPT | Performed by: INTERNAL MEDICINE

## 2024-02-19 ASSESSMENT — PAIN SCALES - GENERAL: PAINLEVEL: NO PAIN (0)

## 2024-02-19 NOTE — LETTER
2024       RE: Rizwana Aguirre  [address]  [facility] [address]     Dear Colleague,    Thank you for referring your patient, Rizwana Aguirre, to the [facility]. Please see a copy of my visit note below.        New Medical Weight Management Consult    PATIENT:  Rizwana Aguirre  MRN:         [id number]  :         1965  YUSEF:         2024    Ms Aguirre is a 59 y/o F w/ PMHx HTN, HLD, migraines, GERD, sleep apnea who comes in for weight management consult.     She states weight has been an issue for her ever since college. Says it got worse after giving birth. She tried Weight Watchers in the past which intermittently helped. She had a diagnosis of breast cancer in the past which put a wrench in her diet and eating plans. Her highest weight since age 19 was 240lb, her lowest was 125lb. There is no notable family history of obesity. She states she has gained 15lb in the last year.     Eats 3 meals a day. Breakfast does an egg with toast, cheese, and butter, or will eat cottage cheese/yogurt. For lunch does which typically consists of vegetable, protein, cheese. She will also have a small dove chocolate piece to reduce sweets cravings. She may snack on a Kind bar or tomatoes/cucumbers at work. For dinner she tends to do a starch, vegetable, and protein. Typically cooks at home. Sometimes she snacks on carrots. A night has a piece of cheese as a snack. Drinks she has tea, diet coke, and Eriberto for beverages, along with water.     1.5 years ago she injured her knee making it difficult to exercise, she is currently in physical therapy.     She has cravings for peanuts and cheese, and sweets. She has daily cravings. Sometimes she feels hungry after work and will snack before dinner.     She drinks wine on the weekend, 2-3 glasses at night on the weekends    She states she saw an optometrist and was told  "she has signs of early glaucoma. Denies uncontrolled HTN. No h/o pancreatitis, personal or family h/o thyroid cancer. Had history of two kidney stones 22 years ago when she was pregnant, was told it was because she was not hydrating.         ASSESSMENT/PLAN:    Ms Aguirre is a 59 y/o F w/ PMHx HTN, HLD, migraines, GERD, sleep apnea who comes in for weight management consult.     #Obesity  BMI today 45.7. Has never been on weight losing medication before. She has some cravings during the day and snacks between most meals. Cheese also appears to be a recurrent component in her diet. She has history of early glaucoma per patient, and h/o 2 kidney stones ~20 years ago when she was pregnant. No h/o pancreatitis  - Discussed dietary changes including eliminating snacks, calorie-enhanced beverages, reducing cheese intake  - Start Wegovy 0.25mg qweekly  - RTC in 4-6 months      She has the following co-morbidities:        2/12/2024     3:46 PM   --   I have the following health issues associated with obesity High Blood Pressure    High Cholesterol    Sleep Apnea    GERD (Reflux)    Asthma   I have the following symptoms associated with obesity Knee Pain    Fatigue    Hip Pain            No data to display                    2/12/2024     3:46 PM   Referring Provider   Please name the provider who referred you to Medical Weight Management  If you do not know, please answer \"I Don't Know\" Dr Marie           2/12/2024     3:46 PM   Weight History   How concerned are you about your weight? Very Concerned   I became overweight After Pregnancy   The following factors have contributed to my weight gain Mental Health Issues    Eating Too Much    Lack of Exercise    Stress   I have tried the following methods to lose weight Watching Portions or Calories    Exercise    Weight Watchers    Atkins-type Diet (Low Carb/High Protein)   My lowest weight since age 19 was 125   My highest weight since age 19 was 240   The most weight I have " ever lost was (lbs) 70   I have the following family history of obesity/being overweight I am the only one in my immediate family who is overweight   How has your weight changed over the last year? Gained   How many pounds? 15           2/12/2024     3:46 PM   Diet Recall Review with Patient   If you do eat breakfast, what types of food do you eat? Eggs, cottage cheese, fruit, cheese, toast   If you do eat lunch, what types of food do you typically eat? High protein, high vegetable, low carb   If you do eat supper, what types of food do you typically eat? Chicken, vegetable and starch   If you do snack, what types of food do you typically eat? Cheese, granola bar, dark chocolate   How many glasses of juice do you drink in a typical day? 0   How many of glasses of milk do you drink in a typical day? 0   How many 8oz glasses of sugar containing drinks such as Jace-Aid/sweet tea do you drink in a day? 0   How many cans/bottles of sugar pop/soda/tea/sports drinks do you drink in a day? 0   How many cans/bottles of diet pop/soda/tea or sports drink do you drink in a day? 3   How often do you have a drink of alcohol? 2-3 TImes a Week   If you do drink, how many drinks might you have in a day? 3-4           2/12/2024     3:46 PM   Eating Habits   Generally, my meals include foods like these bread, pasta, rice, potatoes, corn, crackers, sweet dessert, pop, or juice A Few Times a Week   Generally, my meals include foods like these fried meats, brats, burgers, french fries, pizza, cheese, chips, or ice cream Once a Week   Eat fast food (like McDonalds, [employer], Taco Bell) Once a Week   Eat at a buffet or sit-down restaurant Once a Week   Eat most of my meals in front of the TV or computer Less Than Weekly   Often skip meals, eat at random times, have no regular eating times Never   Rarely sit down for a meal but snack or graze throughout Never   Eat extra snacks between meals Almost Everyday   Eat most of my food at the  end of the day Never   Eat in the middle of the night or wake up at night to eat Never   Eat extra snacks to prevent or correct low blood sugar Never   Eat to prevent acid reflux or stomach pain Never   Worry about not having enough food to eat Never   I eat when I am depressed Less Than Weekly   I eat when I am stressed Once a Week   I eat when I am bored A Few Times a Week   I eat when I am anxious A Few Times a Week   I eat when I am happy or as a reward A Few Times a Week   I feel hungry all the time even if I just have eaten Less Than Weekly   Feeling full is important to me Almost Everyday   I finish all the food on my plate even if I am already full A Few Times a Week   I can't resist eating delicious food or walk past the good food/smell A Few Times a Week   I eat/snack without noticing that I am eating A Few Times a Week   I eat when I am preparing the meal Once a Week   I eat more than usual when I see others eating Less Than Weekly   I have trouble not eating sweets, ice cream, cookies, or chips if they are around the house A Few Times a Week   I think about food all day A Few Times a Week   What foods, if any, do you crave? Cheese           2/12/2024     3:46 PM   Amount of Food   I feel out of control when eating Never   I eat a large amount of food, like a loaf of bread, a box of cookies, a pint/quart of ice cream, all at once Never   I eat a large amount of food even when I am not hungry Monthly   I eat rapidly Monthly   I eat alone because I feel embarrassed and do not want others to see how much I have eaten Never   I eat until I am uncomfortably full Monthly   I feel bad, disgusted, or guilty after I overeat Monthly           2/12/2024     3:46 PM   Activity/Exercise History   How much of a typical 12 hour day do you spend sitting? Most of the Day   How much of a typical 12 hour day do you spend lying down? Less Than Half the Day   How much of a typical day do you spend walking/standing? Less Than  Half the Day   How many hours (not including work) do you spend on the TV/Video Games/Computer/Tablet/Phone? 2-3 Hours   How many times a week are you active for the purpose of exercise? Never   What keeps you from being more active? Pain    Lack of Time    Too tired   How many total minutes do you spend doing some activity for the purpose of exercising when you exercise? 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 100%   How many hours do you spend commuting to work daily? 1   What is your marital status? /In a Relationship   If in a relationship, is your significant other overweight? Yes   If you have children, are they overweight? No   Who do you live with?    Who does the food shopping? Both           2/12/2024     3:46 PM   Mental Health History Reviewed With Patient   Have you ever been physically or sexually abused? No   How often in the past 2 weeks have you felt little interest or pleasure in doing things? For Several Days   Over the past 2 weeks how often have you felt down, depressed, or hopeless? For Several Days           2/12/2024     3:46 PM   Sleep History Reviewed With Patient   How many hours do you sleep at night? 7       MEDICATIONS:   Current Outpatient Medications    Medication Sig Dispense Refill    atorvastatin (LIPITOR) 20 MG tablet TAKE 1 TABLET (20 MG) BY MOUTH DAILY FOR CHOLESTEROL 90 tablet 3    azelastine (OPTIVAR) 0.05 % ophthalmic solution Place 1 drop into both eyes 2 times daily 6 mL 11    buPROPion (WELLBUTRIN XL) 150 MG 24 hr tablet TAKE 1 TABLET BY MOUTH EVERY DAY IN THE MORNING 90 tablet 0    Coenzyme Q10 (COQ-10 PO) Take 2 tablets by mouth       losartan-hydrochlorothiazide (HYZAAR) 100-25 MG tablet Take 1 tablet by mouth daily 90 tablet 3    Multiple Vitamins-Minerals (MULTIVITAMIN ADULTS PO) Take by mouth daily      Omega-3 Fatty Acids (OMEGA-3 FISH OIL PO) Take 2 g by mouth daily       omeprazole (PRILOSEC) 40 MG DR capsule Take 1 capsule (40 mg) by mouth daily Take 30-60 minutes before a meal. (Patient taking differently: Take 40 mg by mouth daily Take 30-60 minutes before a meal. Taking every other day) 90 capsule 1    tobramycin-dexamethasone (TOBRADEX) 0.3-0.1 % ophthalmic ointment Maxitrol ointment  ok substitute  same sig Apply to eyelids- 3 x day for 1 week. 3.5 g 0    triamcinolone (KENALOG) 0.1 % external cream APPLY SPARINGLY TO AFFECTED AREA TWO TIMES DAILY FOR 10-14 DAYS. 30 g 1       ALLERGIES:   Allergies   Allergen Reactions    Cantaloupe [Melon]      Scratchy throat    Shrimp Nausea    Walnuts [Nuts]      Scratchy throat       PHYSICAL EXAM:  GEN: alert, comfortable, NAD  HEENT: EOMI,  anicteric sclera  CV: RRR, normal S1 S2, no m/g/r  RESP: lungs clear to auscultation - no rales, rhonchi or wheezes  ABDOMEN:  soft, nondistended, +BS  MSK: WWP. No pitting edema in b/l LE  NEURO: Alert and oriented, moving all limbs spontaneously, mentation intact and speech normal  PSYCH: Appropriate mood and affect to match      FOLLOW-UP:   4-6 months.    Patient seen and discussed w/ Dr Jason Shelby, PGY2        Attestation signed by Kelton Gomez MD at 2/19/2024  3:22 PM:  Pt was seen and evaluated with the medical resident.  Clinical data was reviewed  and discussed with the patient and with the resident. The note above reflects our  history and findings, and the evaluation and plan reflects my clinical opinion.

## 2024-02-19 NOTE — PROGRESS NOTES
New Medical Weight Management Consult    PATIENT:  Rizwana Aguirre  MRN:         [id number]  :         1965  YUSEF:         2024    Ms Aguirre is a 59 y/o F w/ PMHx HTN, HLD, migraines, GERD, sleep apnea who comes in for weight management consult.     She states weight has been an issue for her ever since college. Says it got worse after giving birth. She tried Weight Watchers in the past which intermittently helped. She had a diagnosis of breast cancer in the past which put a wrench in her diet and eating plans. Her highest weight since age 19 was 240lb, her lowest was 125lb. There is no notable family history of obesity. She states she has gained 15lb in the last year.     Eats 3 meals a day. Breakfast does an egg with toast, cheese, and butter, or will eat cottage cheese/yogurt. For lunch does which typically consists of vegetable, protein, cheese. She will also have a small dove chocolate piece to reduce sweets cravings. She may snack on a Kind bar or tomatoes/cucumbers at work. For dinner she tends to do a starch, vegetable, and protein. Typically cooks at home. Sometimes she snacks on carrots. A night has a piece of cheese as a snack. Drinks she has tea, diet coke, and Eriberto for beverages, along with water.     1.5 years ago she injured her knee making it difficult to exercise, she is currently in physical therapy.     She has cravings for peanuts and cheese, and sweets. She has daily cravings. Sometimes she feels hungry after work and will snack before dinner.     She drinks wine on the weekend, 2-3 glasses at night on the weekends    She states she saw an optometrist and was told she has signs of early glaucoma. Denies uncontrolled HTN. No h/o pancreatitis, personal or family h/o thyroid cancer. Had history of two kidney stones 22 years ago when she was pregnant, was told it was because she was not hydrating.         ASSESSMENT/PLAN:    Ms Aguirre is a 59 y/o F w/ PMHx HTN, HLD, migraines, GERD,  "sleep apnea who comes in for weight management consult.     #Obesity  BMI today 45.7. Has never been on weight losing medication before. She has some cravings during the day and snacks between most meals. Cheese also appears to be a recurrent component in her diet. She has history of early glaucoma per patient, and h/o 2 kidney stones ~20 years ago when she was pregnant. No h/o pancreatitis  - Discussed dietary changes including eliminating snacks, calorie-enhanced beverages, reducing cheese intake  - Start Wegovy 0.25mg qweekly  - RTC in 4-6 months      She has the following co-morbidities:        2/12/2024     3:46 PM   --   I have the following health issues associated with obesity High Blood Pressure    High Cholesterol    Sleep Apnea    GERD (Reflux)    Asthma   I have the following symptoms associated with obesity Knee Pain    Fatigue    Hip Pain            No data to display                    2/12/2024     3:46 PM   Referring Provider   Please name the provider who referred you to Medical Weight Management  If you do not know, please answer \"I Don't Know\" Dr Marie           2/12/2024     3:46 PM   Weight History   How concerned are you about your weight? Very Concerned   I became overweight After Pregnancy   The following factors have contributed to my weight gain Mental Health Issues    Eating Too Much    Lack of Exercise    Stress   I have tried the following methods to lose weight Watching Portions or Calories    Exercise    Weight Watchers    Atkins-type Diet (Low Carb/High Protein)   My lowest weight since age 19 was 125   My highest weight since age 19 was 240   The most weight I have ever lost was (lbs) 70   I have the following family history of obesity/being overweight I am the only one in my immediate family who is overweight   How has your weight changed over the last year? Gained   How many pounds? 15           2/12/2024     3:46 PM   Diet Recall Review with Patient   If you do eat breakfast, " what types of food do you eat? Eggs, cottage cheese, fruit, cheese, toast   If you do eat lunch, what types of food do you typically eat? High protein, high vegetable, low carb   If you do eat supper, what types of food do you typically eat? Chicken, vegetable and starch   If you do snack, what types of food do you typically eat? Cheese, granola bar, dark chocolate   How many glasses of juice do you drink in a typical day? 0   How many of glasses of milk do you drink in a typical day? 0   How many 8oz glasses of sugar containing drinks such as Jace-Aid/sweet tea do you drink in a day? 0   How many cans/bottles of sugar pop/soda/tea/sports drinks do you drink in a day? 0   How many cans/bottles of diet pop/soda/tea or sports drink do you drink in a day? 3   How often do you have a drink of alcohol? 2-3 TImes a Week   If you do drink, how many drinks might you have in a day? 3-4           2/12/2024     3:46 PM   Eating Habits   Generally, my meals include foods like these bread, pasta, rice, potatoes, corn, crackers, sweet dessert, pop, or juice A Few Times a Week   Generally, my meals include foods like these fried meats, brats, burgers, french fries, pizza, cheese, chips, or ice cream Once a Week   Eat fast food (like McDonalds, [employer], Taco Bell) Once a Week   Eat at a buffet or sit-down restaurant Once a Week   Eat most of my meals in front of the TV or computer Less Than Weekly   Often skip meals, eat at random times, have no regular eating times Never   Rarely sit down for a meal but snack or graze throughout Never   Eat extra snacks between meals Almost Everyday   Eat most of my food at the end of the day Never   Eat in the middle of the night or wake up at night to eat Never   Eat extra snacks to prevent or correct low blood sugar Never   Eat to prevent acid reflux or stomach pain Never   Worry about not having enough food to eat Never   I eat when I am depressed Less Than Weekly   I eat when I am  stressed Once a Week   I eat when I am bored A Few Times a Week   I eat when I am anxious A Few Times a Week   I eat when I am happy or as a reward A Few Times a Week   I feel hungry all the time even if I just have eaten Less Than Weekly   Feeling full is important to me Almost Everyday   I finish all the food on my plate even if I am already full A Few Times a Week   I can't resist eating delicious food or walk past the good food/smell A Few Times a Week   I eat/snack without noticing that I am eating A Few Times a Week   I eat when I am preparing the meal Once a Week   I eat more than usual when I see others eating Less Than Weekly   I have trouble not eating sweets, ice cream, cookies, or chips if they are around the house A Few Times a Week   I think about food all day A Few Times a Week   What foods, if any, do you crave? Cheese           2/12/2024     3:46 PM   Amount of Food   I feel out of control when eating Never   I eat a large amount of food, like a loaf of bread, a box of cookies, a pint/quart of ice cream, all at once Never   I eat a large amount of food even when I am not hungry Monthly   I eat rapidly Monthly   I eat alone because I feel embarrassed and do not want others to see how much I have eaten Never   I eat until I am uncomfortably full Monthly   I feel bad, disgusted, or guilty after I overeat Monthly           2/12/2024     3:46 PM   Activity/Exercise History   How much of a typical 12 hour day do you spend sitting? Most of the Day   How much of a typical 12 hour day do you spend lying down? Less Than Half the Day   How much of a typical day do you spend walking/standing? Less Than Half the Day   How many hours (not including work) do you spend on the TV/Video Games/Computer/Tablet/Phone? 2-3 Hours   How many times a week are you active for the purpose of exercise? Never   What keeps you from being more active? Pain    Lack of Time    Too tired   How many total minutes do you spend doing  some activity for the purpose of exercising when you exercise? 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 100%   How many hours do you spend commuting to work daily? 1   What is your marital status? /In a Relationship   If in a relationship, is your significant other overweight? Yes   If you have children, are they overweight? No   Who do you live with?    Who does the food shopping? Both           2/12/2024     3:46 PM   Mental Health History Reviewed With Patient   Have you ever been physically or sexually abused? No   How often in the past 2 weeks have you felt little interest or pleasure in doing things? For Several Days   Over the past 2 weeks how often have you felt down, depressed, or hopeless? For Several Days           2/12/2024     3:46 PM   Sleep History Reviewed With Patient   How many hours do you sleep at night? 7       MEDICATIONS:   Current Outpatient Medications   Medication Sig Dispense Refill    atorvastatin (LIPITOR) 20 MG tablet TAKE 1 TABLET (20 MG) BY MOUTH DAILY FOR CHOLESTEROL 90 tablet 3    azelastine (OPTIVAR) 0.05 % ophthalmic solution Place 1 drop into both eyes 2 times daily 6 mL 11    buPROPion (WELLBUTRIN XL) 150 MG 24 hr tablet TAKE 1 TABLET BY MOUTH  EVERY DAY IN THE MORNING 90 tablet 0    Coenzyme Q10 (COQ-10 PO) Take 2 tablets by mouth       losartan-hydrochlorothiazide (HYZAAR) 100-25 MG tablet Take 1 tablet by mouth daily 90 tablet 3    Multiple Vitamins-Minerals (MULTIVITAMIN ADULTS PO) Take by mouth daily      Omega-3 Fatty Acids (OMEGA-3 FISH OIL PO) Take 2 g by mouth daily       omeprazole (PRILOSEC) 40 MG DR capsule Take 1 capsule (40 mg) by mouth daily Take 30-60 minutes before a meal. (Patient taking differently: Take 40 mg by mouth daily Take 30-60 minutes before a meal. Taking every other day) 90 capsule 1    tobramycin-dexamethasone (TOBRADEX) 0.3-0.1 % ophthalmic ointment Maxitrol ointment  ok substitute  same sig Apply to eyelids- 3 x day for 1 week. 3.5 g 0    triamcinolone (KENALOG) 0.1 % external cream APPLY SPARINGLY TO AFFECTED AREA TWO TIMES DAILY FOR 10-14 DAYS. 30 g 1       ALLERGIES:   Allergies   Allergen Reactions    Cantaloupe [Melon]      Scratchy throat    Shrimp Nausea    Walnuts [Nuts]      Scratchy throat       PHYSICAL EXAM:  GEN: alert, comfortable, NAD  HEENT: EOMI,  anicteric sclera  CV: RRR, normal S1 S2, no m/g/r  RESP: lungs clear to auscultation - no rales, rhonchi or wheezes  ABDOMEN:  soft, nondistended, +BS  MSK: WWP. No pitting edema in b/l LE  NEURO: Alert and oriented, moving all limbs spontaneously, mentation intact and speech normal  PSYCH: Appropriate mood and affect to match      FOLLOW-UP:   4-6 months.    Patient seen and discussed w/ Dr Jason Shelby, PGY2

## 2024-02-19 NOTE — NURSING NOTE
"Chief Complaint   Patient presents with    New Patient     New MWM consult       Vitals:    02/19/24 1054   BP: (!) 142/85   BP Location: Left arm   Patient Position: Chair   Cuff Size: Adult Large   Pulse: 72   SpO2: 96%   Weight: 109.8 kg (242 lb)   Height: 1.549 m (5' 1\")       Body mass index is 45.73 kg/m .                            "

## 2024-02-19 NOTE — TELEPHONE ENCOUNTER
Prior Authorization Approval    Medication: WEGOVY 0.25 MG/0.5ML SC SOAJ  Authorization Effective Date: 1/20/2024  Authorization Expiration Date: 9/16/2024  Approved Dose/Quantity: uud   Reference #: Key: [id number]   Insurance Company: [employer]-Specialty [employer]'s - Phone [phone] Fax [phone]  Expected CoPay: $322.38    CoPay Card Available:      Financial Assistance Needed:    Which Pharmacy is filling the prescription: [id number] - [place], [place] - [address]  Pharmacy Notified: Yes  Patient Notified: Yes     Key: [id number]

## 2024-02-20 ENCOUNTER — VIRTUAL VISIT (OUTPATIENT)
Dept: ENDOCRINOLOGY | Facility: CLINIC | Age: 59
End: 2024-02-20
Payer: COMMERCIAL

## 2024-02-20 VITALS — BODY MASS INDEX: 45.5 KG/M2 | WEIGHT: 241 LBS | HEIGHT: 61 IN

## 2024-02-20 DIAGNOSIS — Z71.3 NUTRITIONAL COUNSELING: Primary | ICD-10-CM

## 2024-02-20 DIAGNOSIS — E66.9 OBESITY: ICD-10-CM

## 2024-02-20 PROCEDURE — 99207 PR NO CHARGE LOS: CPT | Mod: 95 | Performed by: DIETITIAN, REGISTERED

## 2024-02-20 PROCEDURE — 97802 MEDICAL NUTRITION INDIV IN: CPT | Mod: 95 | Performed by: DIETITIAN, REGISTERED

## 2024-02-20 ASSESSMENT — PAIN SCALES - GENERAL: PAINLEVEL: MILD PAIN (3)

## 2024-02-20 NOTE — NURSING NOTE
Is the patient currently in the state of [place]? YES    Visit mode:VIDEO    If the visit is dropped, the patient can be reconnected by: VIDEO VISIT: Send to e-mail at: [email or web address]    Will anyone else be joining the visit? NO  (If patient encounters technical issues they should call [phone]    How would you like to obtain your AVS? MyChart    Are changes needed to the allergy or medication list? N/A    Reason for visit: Consult ([facility])    Sadie NICHOLE

## 2024-02-20 NOTE — LETTER
"2024       RE: Rizwana Aguirre  [address]  [facility] [address]     Dear Colleague,    Thank you for referring your patient, Rizwana Aguirre, to the [facility]. Please see a copy of my visit note below.    Video-Visit Details    Type of service:  Video Visit    Video Start Time: 7:59 am   Video End Time: 8:33 am    Originating Location (pt. Location): Other work    Distant Location (provider location):  Offsite (providers home)     Platform used for Video Visit: [employer]      New Weight Management Nutrition Consultation    Rizwana Aguirre is a 58 year old female presents today for new weight management nutrition consultation.  Patient referred by Dr. Gomez on 2024.    Patient with Co-morbidities of obesity includin/12/2024     3:46 PM   --   I have the following health issues associated with obesity High Blood Pressure    High Cholesterol    Sleep Apnea    GERD (Reflux)    Asthma   I have the following symptoms associated with obesity Knee Pain    Fatigue    Hip Pain       Anthropometrics:  Weight 24: 242 lbs (clinic weight)     Estimated body mass index is 45.56 kg/m  as calculated from the following:    Height as of this encounter: 1.549 m (5' 0.98\").    Weight as of this encounter: 109.3 kg (241 lb).      Medications for Weight Loss:  Wegovy prescribed yesterday     NUTRITION HISTORY  Food allergies: Shellfish, Walnuts, Cantaloupe   Previous methods of diet modification for weight loss: WW (intermittently helped), watching portions/kcal, exercise, atikins type diet, GOLO  RD before: Yes, at part of [facility] program    Feels very knowledgeable in nutrition but can be hard to take action. Stress is a big trigger. Does well with planning meals ahead of time    Pt goals: support with decreasing snacking and around planning meals. Increase movement     Eats 3 meals/day  Typical " food choices  B - egg with toast, cheese and butter OR cottage cheese/yogurt  L - protein, vegetable, cheese and small dove chocolate   D - starch, vegetable, protein   Snacks - kind bar, cucumbers/tomatoes, carrots, cheese    Craves sweets, peanuts and cheese    Hydration: diet coke, la croix, water, tea, alcohol on weekends    [place]: limited by [place] - [place] in [place]    Additional information:  FT - Manager, Learning & Development    Lives with        2/12/2024     3:46 PM   Diet Recall Review with Patient   If you do eat breakfast, what types of food do you eat? Eggs, cottage cheese, fruit, cheese, toast   If you do eat lunch, what types of food do you typically eat? High protein, high vegetable, low carb   If you do eat supper, what types of food do you typically eat? Chicken, vegetable and starch   If you do snack, what types of food do you typically eat? Cheese, granola bar, dark chocolate   How many glasses of juice do you drink in a typical day? 0   How many of glasses of milk do you drink in a typical day? 0   How many 8oz glasses of sugar containing drinks such as Jace-Aid/sweet tea do you drink in a day? 0   How many cans/bottles of sugar pop/soda/tea/sports drinks do you drink in a day? 0   How many cans/bottles of diet pop/soda/tea or sports drink do you drink in a day? 3   How often do you have a drink of alcohol? 2-3 TImes a Week   If you do drink, how many drinks might you have in a day? 3-4           2/12/2024     3:46 PM   Eating Habits   Generally, my meals include foods like these bread, pasta, rice, potatoes, corn, crackers, sweet dessert, pop, or juice A Few Times a Week   Generally, my meals include foods like these fried meats, brats, burgers, french fries, pizza, cheese, chips, or ice cream Once a Week   Eat fast food (like McDonalds, Burger Tigre, Taco Bell) Once a Week   Eat at a buffet or sit-down restaurant Once a Week   Eat most of my meals in front of the TV or computer Less  Than Weekly   Often skip meals, eat at random times, have no regular eating times Never   Rarely sit down for a meal but snack or graze throughout Never   Eat extra snacks between meals Almost Everyday   Eat most of my food at the end of the day Never   Eat in the middle of the night or wake up at night to eat Never   Eat extra snacks to prevent or correct low blood sugar Never   Eat to prevent acid reflux or stomach pain Never   Worry about not having enough food to eat Never   I eat when I am depressed Less Than Weekly   I eat when I am stressed Once a Week   I eat when I am bored A Few Times a Week   I eat when I am anxious A Few Times a Week   I eat when I am happy or as a reward A Few Times a Week   I feel hungry all the time even if I just have eaten Less Than Weekly   Feeling full is important to me Almost Everyday   I finish all the food on my plate even if I am already full A Few Times a Week   I can't resist eating delicious food or walk past the good food/smell A Few Times a Week   I eat/snack without noticing that I am eating A Few Times a Week   I eat when I am preparing the meal Once a Week   I eat more than usual when I see others eating Less Than Weekly   I have trouble not eating sweets, ice cream, cookies, or chips if they are around the house A Few Times a Week   I think about food all day A Few Times a Week   What foods, if any, do you crave? Cheese           2/12/2024     3:46 PM   Amount of Food   I feel out of control when eating Never   I eat a large amount of food, like a loaf of bread, a box of cookies, a pint/quart of ice cream, all at once Never   I eat a large amount of food even when I am not hungry Monthly   I eat rapidly Monthly   I eat alone because I feel embarrassed and do not want others to see how much I have eaten Never   I eat until I am uncomfortably full Monthly   I feel bad, disgusted, or guilty after I overeat Monthly           2/12/2024     3:46 PM   Activity/Exercise  History   How much of a typical 12 hour day do you spend sitting? Most of the Day   How much of a typical 12 hour day do you spend lying down? Less Than Half the Day   How much of a typical day do you spend walking/standing? Less Than Half the Day   How many hours (not including work) do you spend on the TV/Video Games/Computer/Tablet/Phone? 2-3 Hours   How many times a week are you active for the purpose of exercise? Never   What keeps you from being more active? Pain    Lack of Time    Too tired   How many total minutes do you spend doing some activity for the purpose of exercising when you exercise? 15-30 Minutes       Nutrition Prescription  Recommended energy/nutrient modification.    Nutrition Diagnosis  Obesity r/t long history of positive energy balance aeb BMI >30.    Nutrition Intervention  Reviewed current dietary habits and pts history   Discussed long-term goals pt hopes to accomplish in RD appointments  Answered pt questions  Coordination of care   Nutrition education   AVS and handouts via [employer]    Expected Engagement: good    Nutrition Goals/Resources:    Aim to meal plan Saturday mornings as able   Consider utilizing hunger/satiety scale       Fast, easy meals/components:  Ready made grain packets  Hard boiled eggs  Cottage cheese  Greek yogurt  Beans  Lentils  Salad kits  Ready made chicken  Good and gather tikka masala   Frozen vegetables  Raw vegetables   Tariq s meals chicken  Rotisserie chicken   Tuna  Nuts/seeds  Nut butter  Granola   Sandwiches   Egg bakes  Veggie made great egg bites  Veggie made great muffins   Ready to go meals from [employer]: salmon, tacos      Physical hunger signs:   - Builds gradually     - Sensation of emptiness in stomach (rumbling)    - Based on biological need    - Goes away when satisfied/full     Psychological hunger signs:   - Occurs/comes on suddenly   - Felt mostly in the head (thoughts, preoccupation, etc)   - Crave specific foods    - Creates a desire to eat  more despite fullness    - Independent of time last eaten     Follow-Up:  4-6 weeks, prn    Time spent with patient: 34 minutes.  PALLAVI Abrams, RD, LD

## 2024-02-20 NOTE — PROGRESS NOTES
"Video-Visit Details    Type of service:  Video Visit    Video Start Time: 7:59 am   Video End Time: 8:33 am    Originating Location (pt. Location): Other work    Distant Location (provider location):  Offsite (providers home)     Platform used for Video Visit: [employer]! Mobile      New Weight Management Nutrition Consultation    Rizwana Aguirre is a 58 year old female presents today for new weight management nutrition consultation.  Patient referred by Dr. Gomez on 2024.    Patient with Co-morbidities of obesity includin/12/2024     3:46 PM   --   I have the following health issues associated with obesity High Blood Pressure    High Cholesterol    Sleep Apnea    GERD (Reflux)    Asthma   I have the following symptoms associated with obesity Knee Pain    Fatigue    Hip Pain       Anthropometrics:  Weight 24: 242 lbs (clinic weight)     Estimated body mass index is 45.56 kg/m  as calculated from the following:    Height as of this encounter: 1.549 m (5' 0.98\").    Weight as of this encounter: 109.3 kg (241 lb).      Medications for Weight Loss:  Wegovy prescribed yesterday     NUTRITION HISTORY  Food allergies: Shellfish, Walnuts, Cantaloupe   Previous methods of diet modification for weight loss: WW (intermittently helped), watching portions/kcal, exercise, atikins type diet, GOLO  RD before: Yes, at part of [facility] program    Feels very knowledgeable in nutrition but can be hard to take action. Stress is a big trigger. Does well with planning meals ahead of time    Pt goals: support with decreasing snacking and around planning meals. Increase movement     Eats 3 meals/day  Typical food choices  B - egg with toast, cheese and butter OR cottage cheese/yogurt  L - protein, vegetable, cheese and small dove chocolate   D - starch, vegetable, protein   Snacks - kind bar, cucumbers/tomatoes, carrots, cheese    Craves sweets, peanuts and cheese    Hydration: diet coke, la croix, water, tea, alcohol on " weekends    [place]: [place] by [place] - [place] in [place]    Additional information:  FT - Manager, Learning & Development    Lives with        2/12/2024     3:46 PM   Diet Recall Review with Patient   If you do eat breakfast, what types of food do you eat? Eggs, cottage cheese, fruit, cheese, toast   If you do eat lunch, what types of food do you typically eat? High protein, high vegetable, low carb   If you do eat supper, what types of food do you typically eat? Chicken, vegetable and starch   If you do snack, what types of food do you typically eat? Cheese, granola bar, dark chocolate   How many glasses of juice do you drink in a typical day? 0   How many of glasses of milk do you drink in a typical day? 0   How many 8oz glasses of sugar containing drinks such as Jace-Aid/sweet tea do you drink in a day? 0   How many cans/bottles of sugar pop/soda/tea/sports drinks do you drink in a day? 0   How many cans/bottles of diet pop/soda/tea or sports drink do you drink in a day? 3   How often do you have a drink of alcohol? 2-3 TImes a Week   If you do drink, how many drinks might you have in a day? 3-4           2/12/2024     3:46 PM   Eating Habits   Generally, my meals include foods like these bread, pasta, rice, potatoes, corn, crackers, sweet dessert, pop, or juice A Few Times a Week   Generally, my meals include foods like these fried meats, brats, burgers, french fries, pizza, cheese, chips, or ice cream Once a Week   Eat fast food (like McDonalds, Burger Tigre, Taco Bell) Once a Week   Eat at a buffet or sit-down restaurant Once a Week   Eat most of my meals in front of the TV or computer Less Than Weekly   Often skip meals, eat at random times, have no regular eating times Never   Rarely sit down for a meal but snack or graze throughout Never   Eat extra snacks between meals Almost Everyday   Eat most of my food at the end of the day Never   Eat in the middle of the night or wake up at night to eat Never    Eat extra snacks to prevent or correct low blood sugar Never   Eat to prevent acid reflux or stomach pain Never   Worry about not having enough food to eat Never   I eat when I am depressed Less Than Weekly   I eat when I am stressed Once a Week   I eat when I am bored A Few Times a Week   I eat when I am anxious A Few Times a Week   I eat when I am happy or as a reward A Few Times a Week   I feel hungry all the time even if I just have eaten Less Than Weekly   Feeling full is important to me Almost Everyday   I finish all the food on my plate even if I am already full A Few Times a Week   I can't resist eating delicious food or walk past the good food/smell A Few Times a Week   I eat/snack without noticing that I am eating A Few Times a Week   I eat when I am preparing the meal Once a Week   I eat more than usual when I see others eating Less Than Weekly   I have trouble not eating sweets, ice cream, cookies, or chips if they are around the house A Few Times a Week   I think about food all day A Few Times a Week   What foods, if any, do you crave? Cheese           2/12/2024     3:46 PM   Amount of Food   I feel out of control when eating Never   I eat a large amount of food, like a loaf of bread, a box of cookies, a pint/quart of ice cream, all at once Never   I eat a large amount of food even when I am not hungry Monthly   I eat rapidly Monthly   I eat alone because I feel embarrassed and do not want others to see how much I have eaten Never   I eat until I am uncomfortably full Monthly   I feel bad, disgusted, or guilty after I overeat Monthly           2/12/2024     3:46 PM   Activity/Exercise History   How much of a typical 12 hour day do you spend sitting? Most of the Day   How much of a typical 12 hour day do you spend lying down? Less Than Half the Day   How much of a typical day do you spend walking/standing? Less Than Half the Day   How many hours (not including work) do you spend on the TV/Video  Games/Computer/Tablet/Phone? 2-3 Hours   How many times a week are you active for the purpose of exercise? Never   What keeps you from being more active? Pain    Lack of Time    Too tired   How many total minutes do you spend doing some activity for the purpose of exercising when you exercise? 15-30 Minutes       Nutrition Prescription  Recommended energy/nutrient modification.    Nutrition Diagnosis  Obesity r/t long history of positive energy balance aeb BMI >30.    Nutrition Intervention  Reviewed current dietary habits and pts history   Discussed long-term goals pt hopes to accomplish in RD appointments  Answered pt questions  Coordination of care   Nutrition education   AVS and handouts via [employer]    Expected Engagement: good    Nutrition Goals/Resources:    Aim to meal plan Saturday mornings as able   Consider utilizing hunger/satiety scale       Fast, easy meals/components:  Ready made grain packets  Hard boiled eggs  Cottage cheese  Greek yogurt  Beans  Lentils  Salad kits  Ready made chicken  Good and gather tikka masala   Frozen vegetables  Raw vegetables   Tariq s meals chicken  Rotisserie chicken   Tuna  Nuts/seeds  Nut butter  Granola   Sandwiches   Egg bakes  Veggie made great egg bites  Veggie made great muffins   Ready to go meals from [employer]: salmon, tacos      Physical hunger signs:   - Builds gradually     - Sensation of emptiness in stomach (rumbling)    - Based on biological need    - Goes away when satisfied/full     Psychological hunger signs:   - Occurs/comes on suddenly   - Felt mostly in the head (thoughts, preoccupation, etc)   - Crave specific foods    - Creates a desire to eat more despite fullness    - Independent of time last eaten     Follow-Up:  4-6 weeks, prn    Time spent with patient: 34 minutes.  PALLAVI Abrams, RD, LD

## 2024-02-20 NOTE — PATIENT INSTRUCTIONS
Nutrition Goals/Resources:    Aim to meal plan Saturday mornings as able   Consider utilizing hunger/satiety scale       Fast, easy meals/components:  Ready made grain packets  Hard boiled eggs  Cottage cheese  Greek yogurt  Beans  Lentils  Salad kits  Ready made chicken  Good and gather tikka masala   Frozen vegetables  Raw vegetables   Tariq s meals chicken  Rotisserie chicken   Tuna  Nuts/seeds  Nut butter  Granola   Sandwiches   Egg bakes  Veggie made great egg bites  Veggie made great muffins   Ready to go meals from [employer]: salmon, tacos      Physical hunger signs:   - Builds gradually     - Sensation of emptiness in stomach (rumbling)    - Based on biological need    - Goes away when satisfied/full     Psychological hunger signs:   - Occurs/comes on suddenly   - Felt mostly in the head (thoughts, preoccupation, etc)   - Crave specific foods    - Creates a desire to eat more despite fullness    - Independent of time last eaten     Follow-Up:  4-6 weeks, destin Bhatti ([place]), MPP-D, RD, LD  Clinic #: [phone]

## 2024-02-21 ENCOUNTER — THERAPY VISIT (OUTPATIENT)
Dept: PHYSICAL THERAPY | Facility: CLINIC | Age: 59
End: 2024-02-21
Payer: COMMERCIAL

## 2024-02-21 ENCOUNTER — TELEPHONE (OUTPATIENT)
Dept: ENDOCRINOLOGY | Facility: CLINIC | Age: 59
End: 2024-02-21

## 2024-02-21 DIAGNOSIS — M22.2X9 PATELLOFEMORAL PAIN SYNDROME: ICD-10-CM

## 2024-02-21 DIAGNOSIS — G89.29 CHRONIC PAIN OF BOTH KNEES: Primary | ICD-10-CM

## 2024-02-21 DIAGNOSIS — M25.562 CHRONIC PAIN OF BOTH KNEES: Primary | ICD-10-CM

## 2024-02-21 DIAGNOSIS — M25.561 CHRONIC PAIN OF BOTH KNEES: Primary | ICD-10-CM

## 2024-02-21 PROCEDURE — 97530 THERAPEUTIC ACTIVITIES: CPT | Mod: GP | Performed by: PHYSICAL THERAPIST

## 2024-02-21 PROCEDURE — 97110 THERAPEUTIC EXERCISES: CPT | Mod: 59 | Performed by: PHYSICAL THERAPIST

## 2024-02-21 NOTE — PROGRESS NOTES
Analisa-Edwards Patellofemoral Brace Trial:     Pain Rating    Affected Joint: Right Knee    Without  brace:    At rest 2/10  Walkin/10    Squattin/10   Stairs: 10      Wearing  brace:    At rest 0/10  Walkin/10    Squattin/10   Stairs: 1/10      Brace and Measurements:    Brace trialed:     Type - Shields II Analisa and Edwards patellofemoral brace  Size - XL  Side - N/A (Patellofemoral brace)  Affected Knee - Right Knee        Other Comments:  See PT evaluation in Epic for any additional information including joint special testing/ligament testing

## 2024-02-21 NOTE — TELEPHONE ENCOUNTER
Left Voicemail (1st Attempt) and Sent Mychart (1st Attempt) for the patient to call back and schedule the following:    Appointment type: RET [facility]  Provider: Starr Bhatti  Return date: around 3/26/24  Specialty phone number: [phone]    Additional appointment(s) needed:     Appointment type: [facility]  Provider: Dr Gomez  Return date: around 6/20 - 8/20/24  Specialty phone number: [phone]

## 2024-02-29 DIAGNOSIS — R49.9 CHANGE IN VOICE: ICD-10-CM

## 2024-02-29 DIAGNOSIS — K21.9 LPRD (LARYNGOPHARYNGEAL REFLUX DISEASE): ICD-10-CM

## 2024-02-29 RX ORDER — OMEPRAZOLE 40 MG/1
40 CAPSULE, DELAYED RELEASE ORAL DAILY
Qty: 90 CAPSULE | Refills: 1 | Status: SHIPPED | OUTPATIENT
Start: 2024-02-29 | End: 2024-08-19

## 2024-03-25 ENCOUNTER — THERAPY VISIT (OUTPATIENT)
Dept: PHYSICAL THERAPY | Facility: CLINIC | Age: 59
End: 2024-03-25
Payer: COMMERCIAL

## 2024-03-25 DIAGNOSIS — M25.562 CHRONIC PAIN OF BOTH KNEES: Primary | ICD-10-CM

## 2024-03-25 DIAGNOSIS — M22.2X9 PATELLOFEMORAL PAIN SYNDROME: ICD-10-CM

## 2024-03-25 DIAGNOSIS — G89.29 CHRONIC PAIN OF BOTH KNEES: Primary | ICD-10-CM

## 2024-03-25 DIAGNOSIS — M25.561 CHRONIC PAIN OF BOTH KNEES: Primary | ICD-10-CM

## 2024-03-25 PROCEDURE — 97110 THERAPEUTIC EXERCISES: CPT | Mod: GP | Performed by: PHYSICAL THERAPIST

## 2024-03-25 NOTE — PROGRESS NOTES
DISCHARGE  Reason for Discharge: Patient has met all goals.    Equipment Issued:     Discharge Plan: Patient to continue home program.    Referring Provider:  Kristine Marie

## 2024-04-01 NOTE — PROGRESS NOTES
"Video-Visit Details    Type of service:  Video Visit    Video Start Time: 9:05 am  Video End Time: 9:27 am    Originating Location (pt. Location): Other work    Distant Location (provider location):  Offsite (providers home)     Platform used for Video Visit: [employer]       Weight Management Nutrition Consultation    Rizwana Aguirre is a 58 year old female presents today for weight management nutrition consultation.  Patient referred by Dr. Gomez on 2024.    Patient with Co-morbidities of obesity includin/12/2024     3:46 PM   --   I have the following health issues associated with obesity High Blood Pressure    High Cholesterol    Sleep Apnea    GERD (Reflux)    Asthma   I have the following symptoms associated with obesity Knee Pain    Fatigue    Hip Pain       Anthropometrics:  Weight 24: 242 lbs (clinic weight)     Estimated body mass index is 44.05 kg/m  as calculated from the following:    Height as of 24: 1.549 m (5' 0.98\").    Weight as of this encounter: 105.7 kg (233 lb).    Current weight: 233 lbs, pt report    Medications for Weight Loss:  Wegovy - Has not started yet due to shortage     NUTRITION HISTORY  Food allergies: Shellfish, Walnuts, Cantaloupe   Previous methods of diet modification for weight loss: WW (intermittently helped), watching portions/kcal, exercise, atikins type diet, GOLO  RD before: Yes, at part of [facility] program    Feels very knowledgeable in nutrition but can be hard to take action. Stress is a big trigger. Does well with planning meals ahead of time    Pt goals: support with decreasing snacking and around planning meals. Increase movement     Eats 3 meals/day  Typical food choices  B - egg with toast, cheese and butter OR cottage cheese/yogurt  L - protein, vegetable, cheese and small dove chocolate   D - starch, vegetable, protein   Snacks - kind bar, cucumbers/tomatoes, carrots, cheese    Craves sweets, peanuts and cheese    Hydration: diet camron, " la croix, water, tea, alcohol on weekends    PA: limited by knee injury - currently in PT    April 2024:    Has not been able to get Wegovy yet due to shortages.     Was in [place] last month for a week. Ate differently while there. Has been getting hello fresh and doing Easter leftovers the past two weeks now that she is home. Was doing more meal planning prior to [place].    Really likes cheese - wants to know how much is okay to eat. Discussed saturated vs unsaturated fat.     Hydration: not drinking as much water as she should per pt   Barrier: bathroom is far away from office when in person     PA: wants to walk more and recommended by PT as well   - Finished with PT appts for time being    Previous goals:  Aim to meal plan Saturday mornings as able   Consider utilizing hunger/satiety scale - Looked into then forgot. Wants to get back into    Additional information:  FT - Manager, Learning & Development    Lives with        2/12/2024     3:46 PM   Diet Recall Review with Patient   If you do eat breakfast, what types of food do you eat? Eggs, cottage cheese, fruit, cheese, toast   If you do eat lunch, what types of food do you typically eat? High protein, high vegetable, low carb   If you do eat supper, what types of food do you typically eat? Chicken, vegetable and starch   If you do snack, what types of food do you typically eat? Cheese, granola bar, dark chocolate   How many glasses of juice do you drink in a typical day? 0   How many of glasses of milk do you drink in a typical day? 0   How many 8oz glasses of sugar containing drinks such as Jace-Aid/sweet tea do you drink in a day? 0   How many cans/bottles of sugar pop/soda/tea/sports drinks do you drink in a day? 0   How many cans/bottles of diet pop/soda/tea or sports drink do you drink in a day? 3   How often do you have a drink of alcohol? 2-3 TImes a Week   If you do drink, how many drinks might you have in a day? 3-4           2/12/2024     3:46  PM   Eating Habits   Generally, my meals include foods like these bread, pasta, rice, potatoes, corn, crackers, sweet dessert, pop, or juice A Few Times a Week   Generally, my meals include foods like these fried meats, brats, burgers, french fries, pizza, cheese, chips, or ice cream Once a Week   Eat fast food (like McDonalds, Burger Tigre, Taco Bell) Once a Week   Eat at a buffet or sit-down restaurant Once a Week   Eat most of my meals in front of the TV or computer Less Than Weekly   Often skip meals, eat at random times, have no regular eating times Never   Rarely sit down for a meal but snack or graze throughout Never   Eat extra snacks between meals Almost Everyday   Eat most of my food at the end of the day Never   Eat in the middle of the night or wake up at night to eat Never   Eat extra snacks to prevent or correct low blood sugar Never   Eat to prevent acid reflux or stomach pain Never   Worry about not having enough food to eat Never   I eat when I am depressed Less Than Weekly   I eat when I am stressed Once a Week   I eat when I am bored A Few Times a Week   I eat when I am anxious A Few Times a Week   I eat when I am happy or as a reward A Few Times a Week   I feel hungry all the time even if I just have eaten Less Than Weekly   Feeling full is important to me Almost Everyday   I finish all the food on my plate even if I am already full A Few Times a Week   I can't resist eating delicious food or walk past the good food/smell A Few Times a Week   I eat/snack without noticing that I am eating A Few Times a Week   I eat when I am preparing the meal Once a Week   I eat more than usual when I see others eating Less Than Weekly   I have trouble not eating sweets, ice cream, cookies, or chips if they are around the house A Few Times a Week   I think about food all day A Few Times a Week   What foods, if any, do you crave? Cheese           2/12/2024     3:46 PM   Amount of Food   I feel out of control when  eating Never   I eat a large amount of food, like a loaf of bread, a box of cookies, a pint/quart of ice cream, all at once Never   I eat a large amount of food even when I am not hungry Monthly   I eat rapidly Monthly   I eat alone because I feel embarrassed and do not want others to see how much I have eaten Never   I eat until I am uncomfortably full Monthly   I feel bad, disgusted, or guilty after I overeat Monthly           2/12/2024     3:46 PM   Activity/Exercise History   How much of a typical 12 hour day do you spend sitting? Most of the Day   How much of a typical 12 hour day do you spend lying down? Less Than Half the Day   How much of a typical day do you spend walking/standing? Less Than Half the Day   How many hours (not including work) do you spend on the TV/Video Games/Computer/Tablet/Phone? 2-3 Hours   How many times a week are you active for the purpose of exercise? Never   What keeps you from being more active? Pain    Lack of Time    Too tired   How many total minutes do you spend doing some activity for the purpose of exercising when you exercise? 15-30 Minutes       Nutrition Prescription  Recommended energy/nutrient modification.    Nutrition Diagnosis  Obesity r/t long history of positive energy balance aeb BMI >30.    Nutrition Intervention  Reviewed and modified plan  Answered pt questions  Coordination of care   Nutrition education   AVS and handouts via [employer]    Expected Engagement: good    Nutrition Goals/Resources:  Recommend utilizing hunger/satiety scale - can check in more next appointment   Mindful with snack portions/choices   Continue aiming to meal plan on Saturday mornings   Consider being thoughtful about hydration times (example: utilizing bathroom during lunch)  Aim to walk 4x/week as able/tolerated     Fast, easy meals/components:  Ready made grain packets  Hard boiled eggs  Cottage cheese  Greek yogurt  Beans  Lentils  Salad kits  Ready made chicken  Good and gather bryona  masala   Frozen vegetables  Raw vegetables   Tariq s meals chicken  Rotisserie chicken   Tuna  Nuts/seeds  Nut butter  Granola   Sandwiches   Egg bakes  Veggie made great egg bites  Veggie made great muffins   Ready to go meals from [employer]: salmon, tacos      Physical hunger signs:   - Builds gradually     - Sensation of emptiness in stomach (rumbling)    - Based on biological need    - Goes away when satisfied/full     Psychological hunger signs:   - Occurs/comes on suddenly   - Felt mostly in the head (thoughts, preoccupation, etc)   - Crave specific foods    - Creates a desire to eat more despite fullness    - Independent of time last eaten     Follow-Up:  Monday, May 6th at 2:00 pm     Time spent with patient: 22 minutes.  PALLAVI Abrams, RD, LD

## 2024-04-04 ENCOUNTER — VIRTUAL VISIT (OUTPATIENT)
Dept: ENDOCRINOLOGY | Facility: CLINIC | Age: 59
End: 2024-04-04
Payer: COMMERCIAL

## 2024-04-04 VITALS — WEIGHT: 233 LBS | BODY MASS INDEX: 44.05 KG/M2

## 2024-04-04 DIAGNOSIS — Z71.3 NUTRITIONAL COUNSELING: Primary | ICD-10-CM

## 2024-04-04 DIAGNOSIS — E66.9 OBESITY: ICD-10-CM

## 2024-04-04 PROCEDURE — 99207 PR NO CHARGE LOS: CPT | Mod: 95 | Performed by: DIETITIAN, REGISTERED

## 2024-04-04 PROCEDURE — 97803 MED NUTRITION INDIV SUBSEQ: CPT | Mod: 95 | Performed by: DIETITIAN, REGISTERED

## 2024-04-04 NOTE — NURSING NOTE
Is the patient currently in the state of [place]? YES    Visit mode:VIDEO    If the visit is dropped, the patient can be reconnected by: VIDEO VISIT: Send to e-mail at: [email or web address]    Will anyone else be joining the visit? NO  (If patient encounters technical issues they should call [phone]    How would you like to obtain your AVS? MyChart    Are changes needed to the allergy or medication list? No    Reason for visit: RECHCLARITZA NICHOLE

## 2024-04-04 NOTE — LETTER
"2024       RE: Rizwana Aguirre  [address]  [facility] [address]     Dear Colleague,    Thank you for referring your patient, Rizwana Aguirre, to the [facility]. Please see a copy of my visit note below.    Video-Visit Details    Type of service:  Video Visit    Video Start Time: 9:05 am  Video End Time: 9:27 am    Originating Location (pt. Location): Other work    Distant Location (provider location):  Offsite (providers home)     Platform used for Video Visit: [employer]       Weight Management Nutrition Consultation    Rizwana Aguirre is a 58 year old female presents today for weight management nutrition consultation.  Patient referred by Dr. Gomez on 2024.    Patient with Co-morbidities of obesity includin/12/2024     3:46 PM   --   I have the following health issues associated with obesity High Blood Pressure    High Cholesterol    Sleep Apnea    GERD (Reflux)    Asthma   I have the following symptoms associated with obesity Knee Pain    Fatigue    Hip Pain       Anthropometrics:  Weight 24: 242 lbs (clinic weight)     Estimated body mass index is 44.05 kg/m  as calculated from the following:    Height as of 24: 1.549 m (5' 0.98\").    Weight as of this encounter: 105.7 kg (233 lb).    Current weight: 233 lbs, pt report    Medications for Weight Loss:  Wegovy - Has not started yet due to shortage     NUTRITION HISTORY  Food allergies: Shellfish, Walnuts, Cantaloupe   Previous methods of diet modification for weight loss: WW (intermittently helped), watching portions/kcal, exercise, atikins type diet, GOLO  RD before: Yes, at part of [facility] program    Feels very knowledgeable in nutrition but can be hard to take action. Stress is a big trigger. Does well with planning meals ahead of time    Pt goals: support with decreasing snacking and around planning meals. Increase " movement     Eats 3 meals/day  Typical food choices  B - egg with toast, cheese and butter OR cottage cheese/yogurt  L - protein, vegetable, cheese and small dove chocolate   D - starch, vegetable, protein   Snacks - kind bar, cucumbers/tomatoes, carrots, cheese    Craves sweets, peanuts and cheese    Hydration: diet coke, la croix, water, tea, alcohol on weekends    PA: limited by knee injury - currently in PT    April 2024:    Has not been able to get Wegovy yet due to shortages.     Was in [place] last month for a week. Ate differently while there. Has been getting hello fresh and doing Easter leftovers the past two weeks now that she is home. Was doing more meal planning prior to [place].    Really likes cheese - wants to know how much is okay to eat. Discussed saturated vs unsaturated fat.     Hydration: not drinking as much water as she should per pt   Barrier: bathroom is far away from office when in person     PA: wants to walk more and recommended by PT as well   - Finished with PT appts for time being    Previous goals:  Aim to meal plan Saturday mornings as able   Consider utilizing hunger/satiety scale - Looked into then forgot. Wants to get back into    Additional information:  FT - Manager, Learning & Development    Lives with        2/12/2024     3:46 PM   Diet Recall Review with Patient   If you do eat breakfast, what types of food do you eat? Eggs, cottage cheese, fruit, cheese, toast   If you do eat lunch, what types of food do you typically eat? High protein, high vegetable, low carb   If you do eat supper, what types of food do you typically eat? Chicken, vegetable and starch   If you do snack, what types of food do you typically eat? Cheese, granola bar, dark chocolate   How many glasses of juice do you drink in a typical day? 0   How many of glasses of milk do you drink in a typical day? 0   How many 8oz glasses of sugar containing drinks such as Jace-Aid/sweet tea do you drink in a day? 0    How many cans/bottles of sugar pop/soda/tea/sports drinks do you drink in a day? 0   How many cans/bottles of diet pop/soda/tea or sports drink do you drink in a day? 3   How often do you have a drink of alcohol? 2-3 TImes a Week   If you do drink, how many drinks might you have in a day? 3-4           2/12/2024     3:46 PM   Eating Habits   Generally, my meals include foods like these bread, pasta, rice, potatoes, corn, crackers, sweet dessert, pop, or juice A Few Times a Week   Generally, my meals include foods like these fried meats, brats, burgers, french fries, pizza, cheese, chips, or ice cream Once a Week   Eat fast food (like McDonalds, Burger Tigre, Taco Bell) Once a Week   Eat at a buffet or sit-down restaurant Once a Week   Eat most of my meals in front of the TV or computer Less Than Weekly   Often skip meals, eat at random times, have no regular eating times Never   Rarely sit down for a meal but snack or graze throughout Never   Eat extra snacks between meals Almost Everyday   Eat most of my food at the end of the day Never   Eat in the middle of the night or wake up at night to eat Never   Eat extra snacks to prevent or correct low blood sugar Never   Eat to prevent acid reflux or stomach pain Never   Worry about not having enough food to eat Never   I eat when I am depressed Less Than Weekly   I eat when I am stressed Once a Week   I eat when I am bored A Few Times a Week   I eat when I am anxious A Few Times a Week   I eat when I am happy or as a reward A Few Times a Week   I feel hungry all the time even if I just have eaten Less Than Weekly   Feeling full is important to me Almost Everyday   I finish all the food on my plate even if I am already full A Few Times a Week   I can't resist eating delicious food or walk past the good food/smell A Few Times a Week   I eat/snack without noticing that I am eating A Few Times a Week   I eat when I am preparing the meal Once a Week   I eat more than usual  when I see others eating Less Than Weekly   I have trouble not eating sweets, ice cream, cookies, or chips if they are around the house A Few Times a Week   I think about food all day A Few Times a Week   What foods, if any, do you crave? Cheese           2/12/2024     3:46 PM   Amount of Food   I feel out of control when eating Never   I eat a large amount of food, like a loaf of bread, a box of cookies, a pint/quart of ice cream, all at once Never   I eat a large amount of food even when I am not hungry Monthly   I eat rapidly Monthly   I eat alone because I feel embarrassed and do not want others to see how much I have eaten Never   I eat until I am uncomfortably full Monthly   I feel bad, disgusted, or guilty after I overeat Monthly           2/12/2024     3:46 PM   Activity/Exercise History   How much of a typical 12 hour day do you spend sitting? Most of the Day   How much of a typical 12 hour day do you spend lying down? Less Than Half the Day   How much of a typical day do you spend walking/standing? Less Than Half the Day   How many hours (not including work) do you spend on the TV/Video Games/Computer/Tablet/Phone? 2-3 Hours   How many times a week are you active for the purpose of exercise? Never   What keeps you from being more active? Pain    Lack of Time    Too tired   How many total minutes do you spend doing some activity for the purpose of exercising when you exercise? 15-30 Minutes       Nutrition Prescription  Recommended energy/nutrient modification.    Nutrition Diagnosis  Obesity r/t long history of positive energy balance aeb BMI >30.    Nutrition Intervention  Reviewed and modified plan  Answered pt questions  Coordination of care   Nutrition education   AVS and handouts via [employer]    Expected Engagement: good    Nutrition Goals/Resources:  Recommend utilizing hunger/satiety scale - can check in more next appointment   Mindful with snack portions/choices   Continue aiming to meal plan on  Saturday mornings   Consider being thoughtful about hydration times (example: utilizing bathroom during lunch)  Aim to walk 4x/week as able/tolerated     Fast, easy meals/components:  Ready made grain packets  Hard boiled eggs  Cottage cheese  Greek yogurt  Beans  Lentils  Salad kits  Ready made chicken  Good and gather tikka masala   Frozen vegetables  Raw vegetables   Tariq s meals chicken  Rotisserie chicken   Tuna  Nuts/seeds  Nut butter  Granola   Sandwiches   Egg bakes  Veggie made great egg bites  Veggie made great muffins   Ready to go meals from [employer]: salmon, tacos      Physical hunger signs:   - Builds gradually     - Sensation of emptiness in stomach (rumbling)    - Based on biological need    - Goes away when satisfied/full     Psychological hunger signs:   - Occurs/comes on suddenly   - Felt mostly in the head (thoughts, preoccupation, etc)   - Crave specific foods    - Creates a desire to eat more despite fullness    - Independent of time last eaten     Follow-Up:  Monday, May 6th at 2:00 pm     Time spent with patient: 22 minutes.  PALLAVI Abrmas, RD, LD

## 2024-04-04 NOTE — PATIENT INSTRUCTIONS
Nutrition Goals/Resources:  Recommend utilizing hunger/satiety scale - can check in more next appointment   Mindful with snack portions/choices   Continue aiming to meal plan on Saturday mornings   Consider being thoughtful about hydration times (example: utilizing bathroom during lunch)  Aim to walk 4x/week as able/tolerated     Fast, easy meals/components:  Ready made grain packets  Hard boiled eggs  Cottage cheese  Greek yogurt  Beans  Lentils  Salad kits  Ready made chicken  Good and gather tikka masala   Frozen vegetables  Raw vegetables   Tariq s meals chicken  Rotisserie chicken   Tuna  Nuts/seeds  Nut butter  Granola   Sandwiches   Egg bakes  Veggie made great egg bites  Veggie made great muffins   Ready to go meals from [employer]: salmon, tacos      Physical hunger signs:   - Builds gradually     - Sensation of emptiness in stomach (rumbling)    - Based on biological need    - Goes away when satisfied/full     Psychological hunger signs:   - Occurs/comes on suddenly   - Felt mostly in the head (thoughts, preoccupation, etc)   - Crave specific foods    - Creates a desire to eat more despite fullness    - Independent of time last eaten     Follow-Up:  Monday, May 6th at 2:00 pm     Starr Bhatti (Dun[place]), KINGSLEY-D, RD, LD  Clinic #: [phone]

## 2024-04-05 ENCOUNTER — ANCILLARY PROCEDURE (OUTPATIENT)
Dept: MAMMOGRAPHY | Facility: CLINIC | Age: 59
End: 2024-04-05
Attending: PREVENTIVE MEDICINE
Payer: COMMERCIAL

## 2024-04-05 DIAGNOSIS — Z12.31 VISIT FOR SCREENING MAMMOGRAM: ICD-10-CM

## 2024-04-05 DIAGNOSIS — H10.13 ALLERGIC CONJUNCTIVITIS OF BOTH EYES: ICD-10-CM

## 2024-04-05 PROCEDURE — 77063 BREAST TOMOSYNTHESIS BI: CPT | Performed by: RADIOLOGY

## 2024-04-05 PROCEDURE — 77067 SCR MAMMO BI INCL CAD: CPT | Performed by: RADIOLOGY

## 2024-04-05 RX ORDER — AZELASTINE HYDROCHLORIDE 0.5 MG/ML
1 SOLUTION/ DROPS OPHTHALMIC 2 TIMES DAILY
Qty: 6 ML | Refills: 11 | Status: SHIPPED | OUTPATIENT
Start: 2024-04-05

## 2024-04-12 ENCOUNTER — OFFICE VISIT (OUTPATIENT)
Dept: FAMILY MEDICINE | Facility: CLINIC | Age: 59
End: 2024-04-12
Payer: COMMERCIAL

## 2024-04-12 VITALS
RESPIRATION RATE: 17 BRPM | OXYGEN SATURATION: 98 % | BODY MASS INDEX: 45.39 KG/M2 | HEIGHT: 61 IN | HEART RATE: 68 BPM | TEMPERATURE: 97.8 F | WEIGHT: 240.4 LBS | DIASTOLIC BLOOD PRESSURE: 70 MMHG | SYSTOLIC BLOOD PRESSURE: 140 MMHG

## 2024-04-12 DIAGNOSIS — I10 HYPERTENSION GOAL BP (BLOOD PRESSURE) < 140/90: ICD-10-CM

## 2024-04-12 DIAGNOSIS — Z11.59 NEED FOR HEPATITIS C SCREENING TEST: ICD-10-CM

## 2024-04-12 DIAGNOSIS — Z11.4 SCREENING FOR HIV (HUMAN IMMUNODEFICIENCY VIRUS): ICD-10-CM

## 2024-04-12 DIAGNOSIS — R06.02 SHORTNESS OF BREATH: Primary | ICD-10-CM

## 2024-04-12 DIAGNOSIS — E66.01 MORBID OBESITY WITH BMI OF 45.0-49.9, ADULT (H): ICD-10-CM

## 2024-04-12 DIAGNOSIS — Z12.4 CERVICAL CANCER SCREENING: ICD-10-CM

## 2024-04-12 DIAGNOSIS — R73.01 IMPAIRED FASTING GLUCOSE: ICD-10-CM

## 2024-04-12 DIAGNOSIS — F43.21 ADJUSTMENT DISORDER WITH DEPRESSED MOOD: ICD-10-CM

## 2024-04-12 DIAGNOSIS — R06.83 SNORING: ICD-10-CM

## 2024-04-12 DIAGNOSIS — E55.9 VITAMIN D DEFICIENCY: ICD-10-CM

## 2024-04-12 DIAGNOSIS — E78.5 HYPERLIPIDEMIA LDL GOAL <130: ICD-10-CM

## 2024-04-12 LAB — HBA1C MFR BLD: 5.9 % (ref 0–5.6)

## 2024-04-12 PROCEDURE — 82306 VITAMIN D 25 HYDROXY: CPT | Performed by: PREVENTIVE MEDICINE

## 2024-04-12 PROCEDURE — 93000 ELECTROCARDIOGRAM COMPLETE: CPT | Performed by: PREVENTIVE MEDICINE

## 2024-04-12 PROCEDURE — 99214 OFFICE O/P EST MOD 30 MIN: CPT | Mod: 25 | Performed by: PREVENTIVE MEDICINE

## 2024-04-12 PROCEDURE — 80053 COMPREHEN METABOLIC PANEL: CPT | Performed by: PREVENTIVE MEDICINE

## 2024-04-12 PROCEDURE — 83036 HEMOGLOBIN GLYCOSYLATED A1C: CPT | Performed by: PREVENTIVE MEDICINE

## 2024-04-12 PROCEDURE — 82043 UR ALBUMIN QUANTITATIVE: CPT | Performed by: PREVENTIVE MEDICINE

## 2024-04-12 PROCEDURE — 36415 COLL VENOUS BLD VENIPUNCTURE: CPT | Performed by: PREVENTIVE MEDICINE

## 2024-04-12 PROCEDURE — 87389 HIV-1 AG W/HIV-1&-2 AB AG IA: CPT | Performed by: PREVENTIVE MEDICINE

## 2024-04-12 PROCEDURE — G0145 SCR C/V CYTO,THINLAYER,RESCR: HCPCS | Performed by: PREVENTIVE MEDICINE

## 2024-04-12 PROCEDURE — 82570 ASSAY OF URINE CREATININE: CPT | Performed by: PREVENTIVE MEDICINE

## 2024-04-12 PROCEDURE — 80061 LIPID PANEL: CPT | Performed by: PREVENTIVE MEDICINE

## 2024-04-12 PROCEDURE — 86803 HEPATITIS C AB TEST: CPT | Performed by: PREVENTIVE MEDICINE

## 2024-04-12 PROCEDURE — 87624 HPV HI-RISK TYP POOLED RSLT: CPT | Performed by: PREVENTIVE MEDICINE

## 2024-04-12 RX ORDER — BUPROPION HYDROCHLORIDE 300 MG/1
300 TABLET ORAL EVERY MORNING
Qty: 90 TABLET | Refills: 1 | Status: SHIPPED | OUTPATIENT
Start: 2024-04-12 | End: 2024-04-24

## 2024-04-12 ASSESSMENT — ENCOUNTER SYMPTOMS: SHORTNESS OF BREATH: 1

## 2024-04-12 ASSESSMENT — PAIN SCALES - GENERAL: PAINLEVEL: MILD PAIN (3)

## 2024-04-12 NOTE — RESULT ENCOUNTER NOTE
Rizwana,     3-month glucose number hemoglobin A1c is improved from last check but still in a prediabetic range.  Prediabetes means your blood sugar is high and you are at increased risk for developing overt diabetes in the future.   Be sure to monitor your intake of things like bread, pasta, rice, starchy foods (ie: potatoes), sugary beverages (ie: soda, juice-even the natural kind) and alcohol.  I recommend your plate be 1/2 vegetables, 1/4 protein, and 1/4 carbohydrate.    Please do not hesitate to call us at [phone] if you have any questions or concerns.    Other labs are pending.     Thank you,    Kristine Marie MD MPH

## 2024-04-12 NOTE — PATIENT INSTRUCTIONS
Call [phone] to schedule ECHO of heart at [facility] location.       Referral Details    Referred By  Referred To   Kristine Marie MD [address] [place] [facility] [address]   Phone: [phone]   Fax: [phone]    Diagnoses: Snoring   Order: Adult Sleep Eval & Management  Referral       Comment: Please be aware that coverage of these services is subject to the terms and limitations of your health insurance plan.  Call member services at your health plan with any benefit or coverage questions.   [facility] will call you to coordinate your care as prescribed by your provider. If you don't hear from a representative within 2 business days, please call [phone].

## 2024-04-12 NOTE — PROGRESS NOTES
Assessment & Plan     Shortness of breath  -EKG with no acute changes, hence will proceed with echocardiogram versus stress test  -Differentials considered include congestive heart failure, pulmonary disease, deconditioning  -Await results of imaging  -If echo is normal then can consider lung function tests, no wheezing, no chronic cough  - EKG 12-lead complete w/read - Clinics  - Echocardiogram Complete    Hypertension goal BP (blood pressure) < 140/90  -Blood pressure readings are elevated  -Patient would like to monitor blood pressure at home, will update via Intematixt if readings are not at goal then will need to add medication.   - Albumin Random Urine Quantitative with Creat Ratio  - Comprehensive metabolic panel    Hyperlipidemia LDL goal <130  -Await labs  - Lipid panel reflex to direct LDL Non-fasting  - Comprehensive metabolic panel      The 10-year ASCVD risk score (Shara SCHWARTZ, et al., 2019) is: 3.1%    Values used to calculate the score:      Age: 58 years      Sex: Female      Is Non- : No      Diabetic: No      Tobacco smoker: No      Systolic Blood Pressure: 140 mmHg      Is BP treated: Yes      HDL Cholesterol: 62 mg/dL      Total Cholesterol: 174 mg/dL    Vitamin D deficiency  - has had low vitamin D levels in the past  - Vitamin D Deficiency    Morbid obesity with BMI of 45.0-49.9, adult (H)  Wt Readings from Last 2 Encounters:   04/12/24 109 kg (240 lb 6.4 oz)   04/04/24 105.7 kg (233 lb)     -Has been seen by the weight clinic  -Was prescribed Wegovy, however unable to start the medication due to shortages    Impaired fasting glucose  - Hemoglobin A1c    Lab Results   Component Value Date    A1C 5.9 04/12/2024    A1C 6.1 04/07/2023    A1C 6.1 02/24/2022    A1C 5.8 02/12/2021   -Be sure to monitor your intake of things like bread, pasta, rice, starchy foods (ie: potatoes), sugary beverages (ie: soda, juice-even the natural kind) and alcohol.  I recommend your plate be 1/2  "vegetables, 1/4 protein, and 1/4 carbohydrate.      Snoring  -No prior sleep apnea evaluation  -Snoring noted  -Does use a mouthguard  - Adult Sleep Eval & Management  Referral    Adjustment disorder with depressed mood  -We discussed increasing bupropion from 150 mg to 300 mg to help with weight loss as well  -No increased anxiety symptoms  - buPROPion (WELLBUTRIN XL) 300 MG 24 hr tablet  Dispense: 90 tablet; Refill: 1    Screening for HIV (human immunodeficiency virus)  -Screening guidelines reviewed  - HIV Antigen Antibody Combo    Need for hepatitis C screening test  -Screening guidelines reviewed  - Hepatitis C Screen Reflex to HCV RNA Quant and Genotype    Cervical cancer screening  -History of abnormal Pap smear+  - Pap Screen with HPV - recommended age 30 - 65 years      Ordering of each unique test  Prescription drug management  38 minutes spent by me on the date of the encounter doing chart review, history and exam, documentation and further activities per the note      BMI  Estimated body mass index is 45.45 kg/m  as calculated from the following:    Height as of this encounter: 1.549 m (5' 0.98\").    Weight as of this encounter: 109 kg (240 lb 6.4 oz).   Weight management plan: Discussed healthy diet and exercise guidelines      Regular exercise    Hernan Guthrie is a 58 year old, presenting for the following health issues:  Shortness of Breath and Recheck Medication        4/12/2024     1:54 PM   Additional Questions   Roomed by Shirley         4/12/2024     1:54 PM   Patient Reported Additional Medications   Patient reports taking the following new medications none     History of Present Illness     Asthma:  She presents for follow up of asthma.  She has no cough, no wheezing, and some shortness of breath. She is not using a relief medication.  She does not have a controller medication. Patient is aware of the following triggers: exercise or sports, gastric reflux, mold and pollens. The " "patient has not had a visit to the Emergency Room, Urgent Care or Hospital due to asthma since the last clinic visit.     Mental Health Follow-up:  Patient presents to follow-up on Depression.Patient's depression since last visit has been:  Good  The patient is not having other symptoms associated with depression.      Any significant life events: No  Patient is not feeling anxious or having panic attacks.  Patient has no concerns about alcohol or drug use.    Reason for visit:  Prescription refill and to talk about shortness of breath    She eats 4 or more servings of fruits and vegetables daily.She consumes 0 sweetened beverage(s) daily.She exercises with enough effort to increase her heart rate 9 or less minutes per day.  She exercises with enough effort to increase her heart rate 3 or less days per week.   She is taking medications regularly.     Patient needs refills on triamcinolone, omeprazole, tobramycin-dexamethasone, and buPROPion.    HTN:  -not checked at home    Shortness of breath:  -had Covid around X mas  -had asthma as a child  -not at rest  -not when laying down  -walking across parking lot will get symptoms  -some feeling of being winded  -no wheezing  -no pedal edema  -has had problems with knees, had PT, cortisone about 6 months ago  -no CPAP   -snore sometimes+  -uses mouth guard   -both grandfathers with CAD     Mood:  -ran out of Wellbutrin for a few weeks, noticed a definite change.         Review of Systems  Constitutional, HEENT, cardiovascular, pulmonary, gi and gu systems are negative, except as otherwise noted.      Objective    BP (!) 140/70   Pulse 68   Temp 97.8  F (36.6  C) (Oral)   Resp 17   Ht 1.549 m (5' 0.98\")   Wt 109 kg (240 lb 6.4 oz)   LMP 02/01/2017 (Approximate)   SpO2 98%   BMI 45.45 kg/m    Body mass index is 45.45 kg/m .  Physical Exam   GENERAL: alert and no distress  EYES: Eyes grossly normal to inspection, PERRL and conjunctivae and sclerae normal  NECK: no " adenopathy, no asymmetry, masses, or scars  RESP: lungs clear to auscultation - no rales, rhonchi or wheezes  CV: regular rate and rhythm, normal S1 S2, no S3 or S4, no murmur, click or rub, trace peripheral edema+  ABDOMEN: soft, nontender, no hepatosplenomegaly, no masses and bowel sounds normal   (female): normal female external genitalia, normal urethral meatus, normal vaginal mucosa, and normal cervix without masses  MS: no gross musculoskeletal defects noted, no edema  SKIN: no suspicious lesions or rashes  NEURO: Normal strength and tone, mentation intact and speech normal  PSYCH: mentation appears normal, affect normal/bright    Results for orders placed or performed in visit on 04/12/24 (from the past 24 hour(s))   Hemoglobin A1c   Result Value Ref Range    Hemoglobin A1C 5.9 (H) 0.0 - 5.6 %           Signed Electronically by: Kristine Marie MD MPH

## 2024-04-13 LAB
ALBUMIN SERPL BCG-MCNC: 4.5 G/DL (ref 3.5–5.2)
ALP SERPL-CCNC: 78 U/L (ref 40–150)
ALT SERPL W P-5'-P-CCNC: 35 U/L (ref 0–50)
ANION GAP SERPL CALCULATED.3IONS-SCNC: 12 MMOL/L (ref 7–15)
AST SERPL W P-5'-P-CCNC: 23 U/L (ref 0–45)
BILIRUB SERPL-MCNC: 0.2 MG/DL
BUN SERPL-MCNC: 18 MG/DL (ref 6–20)
CALCIUM SERPL-MCNC: 9.5 MG/DL (ref 8.6–10)
CHLORIDE SERPL-SCNC: 102 MMOL/L (ref 98–107)
CHOLEST SERPL-MCNC: 257 MG/DL
CREAT SERPL-MCNC: 0.68 MG/DL (ref 0.51–0.95)
CREAT UR-MCNC: 76.4 MG/DL
DEPRECATED HCO3 PLAS-SCNC: 25 MMOL/L (ref 22–29)
EGFRCR SERPLBLD CKD-EPI 2021: >90 ML/MIN/1.73M2
FASTING STATUS PATIENT QL REPORTED: ABNORMAL
GLUCOSE SERPL-MCNC: 135 MG/DL (ref 70–99)
HCV AB SERPL QL IA: NONREACTIVE
HDLC SERPL-MCNC: 59 MG/DL
HIV 1+2 AB+HIV1 P24 AG SERPL QL IA: NONREACTIVE
LDLC SERPL CALC-MCNC: 156 MG/DL
MICROALBUMIN UR-MCNC: <12 MG/L
MICROALBUMIN/CREAT UR: NORMAL MG/G{CREAT}
NONHDLC SERPL-MCNC: 198 MG/DL
POTASSIUM SERPL-SCNC: 3.6 MMOL/L (ref 3.4–5.3)
PROT SERPL-MCNC: 7.4 G/DL (ref 6.4–8.3)
SODIUM SERPL-SCNC: 139 MMOL/L (ref 135–145)
TRIGL SERPL-MCNC: 208 MG/DL
VIT D+METAB SERPL-MCNC: 19 NG/ML (ref 20–50)

## 2024-04-13 NOTE — RESULT ENCOUNTER NOTE
Jerri,    Here are your cholesterol results:    Your LDL is: Lab Results       Component                Value               Date                       LDL                      156                 04/12/2024                 LDL                      150                 02/12/2021          Your LDL goal is to be less than 100  Your HDL is: Lab Results       Component                Value               Date                       HDL                      59                  04/12/2024                 HDL                      60                  02/12/2021           Goal HDL is Greater than 40 (for men) or 50 (for women).  Your Triglycerides are: Lab Results       Component                Value               Date                       TRIG                     208                 04/12/2024                 TRIG                     127                 02/12/2021         Goal TRIGLYCERIDES are less than 150.     Please take your cholesterol medication daily.   Here are some ways to improve your cholesterol in addition to medication:    Try to get at least 45 minutes of aerobic exercise 5-6 days a week  Maintain a healthy body weight  Eat less saturated fats  Buy lean cuts of meat, reduce your portions of red meat or substitute poultry or fish  Avoid fried or fast foods that are high in fat  Eat more fruits and vegetables    Electrolytes, kidney function and liver function tests are normal.  Urine sample is not showing any abnormal protein.  Screening tests for HIV and Hepatitis C are negative.  Vitamin D levels are slightly low, please take over the counter Vitamin D 3 in a dose of 5000 units daily for 2-3 months.       Please do not hesitate to call us at [phone] if you have any questions or concerns.    Thank you,    Kristine Marie MD MPH

## 2024-04-17 LAB
BKR LAB AP GYN ADEQUACY: NORMAL
BKR LAB AP GYN INTERPRETATION: NORMAL
BKR LAB AP HPV REFLEX: NORMAL
BKR LAB AP PREVIOUS ABNL DX: NORMAL
BKR LAB AP PREVIOUS ABNORMAL: NORMAL
PATH REPORT.COMMENTS IMP SPEC: NORMAL
PATH REPORT.COMMENTS IMP SPEC: NORMAL
PATH REPORT.RELEVANT HX SPEC: NORMAL

## 2024-04-18 LAB
HUMAN PAPILLOMA VIRUS 16 DNA: NEGATIVE
HUMAN PAPILLOMA VIRUS 18 DNA: NEGATIVE
HUMAN PAPILLOMA VIRUS FINAL DIAGNOSIS: NORMAL
HUMAN PAPILLOMA VIRUS OTHER HR: NEGATIVE

## 2024-04-23 DIAGNOSIS — L20.9 ATOPIC DERMATITIS, UNSPECIFIED TYPE: ICD-10-CM

## 2024-04-23 RX ORDER — TRIAMCINOLONE ACETONIDE 1 MG/G
CREAM TOPICAL
Qty: 30 G | Refills: 1 | Status: SHIPPED | OUTPATIENT
Start: 2024-04-23

## 2024-04-24 ENCOUNTER — MYC MEDICAL ADVICE (OUTPATIENT)
Dept: FAMILY MEDICINE | Facility: CLINIC | Age: 59
End: 2024-04-24
Payer: COMMERCIAL

## 2024-04-24 DIAGNOSIS — F43.21 ADJUSTMENT DISORDER WITH DEPRESSED MOOD: ICD-10-CM

## 2024-04-24 DIAGNOSIS — I10 HYPERTENSION GOAL BP (BLOOD PRESSURE) < 140/90: ICD-10-CM

## 2024-04-24 RX ORDER — LOSARTAN POTASSIUM AND HYDROCHLOROTHIAZIDE 25; 100 MG/1; MG/1
1 TABLET ORAL DAILY
Qty: 90 TABLET | Refills: 3 | Status: SHIPPED | OUTPATIENT
Start: 2024-04-24 | End: 2024-08-21

## 2024-04-24 RX ORDER — BUPROPION HYDROCHLORIDE 150 MG/1
150 TABLET ORAL EVERY MORNING
Qty: 90 TABLET | Refills: 0 | Status: SHIPPED | OUTPATIENT
Start: 2024-04-24 | End: 2024-07-16

## 2024-04-24 NOTE — TELEPHONE ENCOUNTER
Called and the Mailbox is full. Sent patient a My Chart message.  Shelby Kumar [facility]   Primary Care

## 2024-04-24 NOTE — TELEPHONE ENCOUNTER
Patient needs to update home blood pressure readings via My Chart. Thank you, Kristine Marie MD MPH

## 2024-04-30 ENCOUNTER — TRANSFERRED RECORDS (OUTPATIENT)
Dept: MULTI SPECIALTY CLINIC | Facility: CLINIC | Age: 59
End: 2024-04-30

## 2024-04-30 LAB — RETINOPATHY: NORMAL

## 2024-05-17 ENCOUNTER — OFFICE VISIT (OUTPATIENT)
Dept: URGENT CARE | Facility: URGENT CARE | Age: 59
End: 2024-05-17
Payer: COMMERCIAL

## 2024-05-17 VITALS
TEMPERATURE: 97.3 F | HEART RATE: 85 BPM | SYSTOLIC BLOOD PRESSURE: 150 MMHG | DIASTOLIC BLOOD PRESSURE: 91 MMHG | OXYGEN SATURATION: 97 % | RESPIRATION RATE: 20 BRPM

## 2024-05-17 DIAGNOSIS — I10 ELEVATED BLOOD PRESSURE READING IN OFFICE WITH DIAGNOSIS OF HYPERTENSION: ICD-10-CM

## 2024-05-17 DIAGNOSIS — B02.9 HERPES ZOSTER WITHOUT COMPLICATION: Primary | ICD-10-CM

## 2024-05-17 DIAGNOSIS — R68.89: ICD-10-CM

## 2024-05-17 PROCEDURE — 99214 OFFICE O/P EST MOD 30 MIN: CPT | Performed by: PHYSICIAN ASSISTANT

## 2024-05-17 RX ORDER — PREDNISONE 10 MG/1
TABLET ORAL
Qty: 30 TABLET | Refills: 0 | Status: SHIPPED | OUTPATIENT
Start: 2024-05-17

## 2024-05-17 RX ORDER — VALACYCLOVIR HYDROCHLORIDE 1 G/1
1000 TABLET, FILM COATED ORAL 3 TIMES DAILY
Qty: 21 TABLET | Refills: 0 | Status: SHIPPED | OUTPATIENT
Start: 2024-05-17 | End: 2024-05-24

## 2024-05-17 NOTE — PROGRESS NOTES
Chief Complaint   Patient presents with    Rash     On back and right arm              ASSESSMENT:    ICD-10-CM    1. Herpes zoster without complication  B02.9 valACYclovir (VALTREX) 1000 mg tablet     predniSONE (DELTASONE) 10 MG tablet      2. Abnormal finding of trunk  R68.89 valACYclovir (VALTREX) 1000 mg tablet     predniSONE (DELTASONE) 10 MG tablet      3. Elevated blood pressure reading in office with diagnosis of hypertension  I10           PLAN: Shingles.  Valtrex.  Prednisone taper.  Avoid those who are pregnant, under the age of 2 or who have never had chickenpox until lesions are scabbed over.  Information pamphlet on shingles given.  See today's orders.  Follow-up with primary clinic if not improving.  Advised about symptoms which might herald more serious problems.    Recheck blood pressure outside of clinic once feeling better and follow-up with primary for any concerns.    Naima Pepe PA-C         SUBJECTIVE:  58 year old female who presents with painful rash right trunk for the last few days.  Started with pain in the area before the rash showed up.  No fever or chills.  Did have chickenpox as a child.  Under lots of stress lately.               Allergies   Allergen Reactions    Cantaloupe [Melon]      Scratchy throat    Shrimp Nausea    Walnuts [Nuts]      Scratchy throat       Past Medical History:   Diagnosis Date    Aortic insufficiency 10/16/2009    Trace - per ECHO    Breast cancer (H)     Cervical high risk HPV (human papillomavirus) test positive 10/26/2018, 11/15/19    See problem list    DCIS (ductal carcinoma in situ) 11/14/2016    Left breast    Heart murmur     HTN (hypertension), benign     Hyperphoria 04/08/2013    Kidney stones     Migraines     Mitral insufficiency 10/16/2009    Mild - per ECHO    Motion sickness     Seasonal allergic rhinitis     Uncomplicated asthma        Current Outpatient Medications   Medication Sig Dispense Refill    atorvastatin (LIPITOR) 20 MG  tablet TAKE 1 TABLET (20 MG) BY MOUTH DAILY FOR CHOLESTEROL 90 tablet 3    azelastine (OPTIVAR) 0.05 % ophthalmic solution Place 1 drop into both eyes 2 times daily 6 mL 11    buPROPion (WELLBUTRIN XL) 150 MG 24 hr tablet Take 1 tablet (150 mg) by mouth every morning 90 tablet 0    losartan-hydrochlorothiazide (HYZAAR) 100-25 MG tablet TAKE 1 TABLET BY MOUTH EVERY DAY 90 tablet 3    Multiple Vitamins-Minerals (MULTIVITAMIN ADULTS PO) Take by mouth daily      Omega-3 Fatty Acids (OMEGA-3 FISH OIL PO) Take 2 g by mouth daily       omeprazole (PRILOSEC) 40 MG DR capsule TAKE 1 CAPSULE (40 MG) BY MOUTH DAILY TAKE 30-60 MINUTES BEFORE A MEAL. 90 capsule 1    Semaglutide-Weight Management (WEGOVY) 0.25 MG/0.5ML pen Inject 0.25 mg Subcutaneous once a week 2 mL 11    tobramycin-dexamethasone (TOBRADEX) 0.3-0.1 % ophthalmic ointment Maxitrol ointment  ok substitute  same sig Apply to eyelids- 3 x day for 1 week. 3.5 g 0    triamcinolone (KENALOG) 0.1 % external cream APPLY SPARINGLY TO AFFECTED AREA TWO TIMES DAILY FOR 10-14 DAYS. 30 g 1     Current Facility-Administered Medications   Medication Dose Route Frequency Provider Last Rate Last Admin    triamcinolone (KENALOG-40) injection 40 mg  40 mg   Rick Weeks, DO   40 mg at 11/04/23 0921    triamcinolone (KENALOG-40) injection 40 mg  40 mg   Rick Weeks, DO   40 mg at 11/04/23 0921       Social History     Tobacco Use    Smoking status: Never    Smokeless tobacco: Never   Substance Use Topics    Alcohol use: Yes     Comment: Social    Drug use: No       ROS:  General: negative for fever  SKIN: + as above      Physcial Exam:  BP (!) 150/91   Pulse 85   Temp 97.3  F (36.3  C) (Tympanic)   Resp 20   LMP 02/01/2017 (Approximate)   SpO2 97%     GENERAL: alert, no acute distress  EYES: conjunctival clear  RESP: Regular breathing rate  NEURO: awake .  SKIN: Right thoracic back and underneath the axilla is with clusters of red papules and blisters.    Naima Pepe,  ASHWINI

## 2024-06-17 ENCOUNTER — TELEPHONE (OUTPATIENT)
Dept: ENDOCRINOLOGY | Facility: CLINIC | Age: 59
End: 2024-06-17

## 2024-06-17 ENCOUNTER — OFFICE VISIT (OUTPATIENT)
Dept: ENDOCRINOLOGY | Facility: CLINIC | Age: 59
End: 2024-06-17
Payer: COMMERCIAL

## 2024-06-17 VITALS
HEART RATE: 79 BPM | BODY MASS INDEX: 44.37 KG/M2 | WEIGHT: 235 LBS | OXYGEN SATURATION: 94 % | SYSTOLIC BLOOD PRESSURE: 141 MMHG | DIASTOLIC BLOOD PRESSURE: 92 MMHG | HEIGHT: 61 IN

## 2024-06-17 DIAGNOSIS — R73.01 IMPAIRED FASTING GLUCOSE: ICD-10-CM

## 2024-06-17 DIAGNOSIS — E66.01 MORBID OBESITY (H): Primary | ICD-10-CM

## 2024-06-17 DIAGNOSIS — I10 HTN (HYPERTENSION), BENIGN: ICD-10-CM

## 2024-06-17 PROCEDURE — 99213 OFFICE O/P EST LOW 20 MIN: CPT | Performed by: INTERNAL MEDICINE

## 2024-06-17 ASSESSMENT — PAIN SCALES - GENERAL: PAINLEVEL: MILD PAIN (3)

## 2024-06-17 NOTE — TELEPHONE ENCOUNTER
Patient confirmed scheduled appointment:  Date: 6/20/24  Time: 2 pm  Visit type: RET [facility] Nutrition  Provider: Indira Porter  Location: virtual  Testing/imaging: n/a  Additional notes: n/a

## 2024-06-17 NOTE — PROGRESS NOTES
"    Return Medical Weight Management Note     Rizwana Aguirre  MRN:  [id number]  :  1965  YUSEF:  2024    Dear Kristine Marie MD,    I had the pleasure of seeing your patient Rizwana Aguirre.  She is a 58 year old female who I am continuing to see for treatment of obesity related to:      2024     3:46 PM   --   I have the following health issues associated with obesity High Blood Pressure    High Cholesterol    Sleep Apnea    GERD (Reflux)    Asthma   I have the following symptoms associated with obesity Knee Pain    Fatigue    Hip Pain     CURRENT WEIGHT:   235 lbs 0 oz    Wt Readings from Last 4 Encounters:   24 106.6 kg (235 lb)   24 109 kg (240 lb 6.4 oz)   24 105.7 kg (233 lb)   24 109.3 kg (241 lb)     Height:  5' 1\"  Body Mass Index:  Body mass index is 44.4 kg/m .  Vitals:  B/P: 141/92, P: 79    Initial consult weight was 240 on 24.  Weight change since last seen on 24 is down 5 pounds.   Total loss is 6 pounds.    INTERVAL HISTORY:  Just able to start Wegovy 0.2/w 3 wks ago due to shortage. Feels early satiety and reduced need for late night snacking.         2024     3:46 PM   Diet Recall Review with Patient   If you do eat breakfast, what types of food do you eat? Eggs, cottage cheese, fruit, cheese, toast   If you do eat lunch, what types of food do you typically eat? High protein, high vegetable, low carb   If you do eat supper, what types of food do you typically eat? Chicken, vegetable and starch   If you do snack, what types of food do you typically eat? Cheese, granola bar, dark chocolate   How many glasses of juice do you drink in a typical day? 0   How many of glasses of milk do you drink in a typical day? 0   How many 8oz glasses of sugar containing drinks such as Jace-Aid/sweet tea do you drink in a day? 0   How many cans/bottles of sugar pop/soda/tea/sports drinks do you drink in a day? 0   How many cans/bottles of diet pop/soda/tea or sports drink " do you drink in a day? 3   How often do you have a drink of alcohol? 2-3 TImes a Week   If you do drink, how many drinks might you have in a day? 3-4     MEDICATIONS:   Current Outpatient Medications   Medication Sig Dispense Refill    atorvastatin (LIPITOR) 20 MG tablet TAKE 1 TABLET (20 MG) BY MOUTH DAILY FOR CHOLESTEROL 90 tablet 3    azelastine (OPTIVAR) 0.05 % ophthalmic solution Place 1 drop into both eyes 2 times daily 6 mL 11    buPROPion (WELLBUTRIN XL) 150 MG 24 hr tablet Take 1 tablet (150 mg) by mouth every morning 90 tablet 0    losartan-hydrochlorothiazide (HYZAAR) 100-25 MG tablet TAKE 1 TABLET BY MOUTH EVERY DAY 90 tablet 3    Multiple Vitamins-Minerals (MULTIVITAMIN ADULTS PO) Take by mouth daily      Omega-3 Fatty Acids (OMEGA-3 FISH OIL PO) Take 2 g by mouth daily       omeprazole (PRILOSEC) 40 MG DR capsule TAKE 1 CAPSULE (40 MG) BY MOUTH DAILY TAKE 30-60 MINUTES BEFORE A MEAL. 90 capsule 1    Semaglutide-Weight Management (WEGOVY) 0.25 MG/0.5ML pen Inject 0.25 mg Subcutaneous once a week 2 mL 11    tobramycin-dexamethasone (TOBRADEX) 0.3-0.1 % ophthalmic ointment Maxitrol ointment  ok substitute  same sig Apply to eyelids- 3 x day for 1 week. 3.5 g 0    triamcinolone (KENALOG) 0.1 % external cream APPLY SPARINGLY TO AFFECTED AREA TWO TIMES DAILY FOR 10-14 DAYS. 30 g 1    predniSONE (DELTASONE) 10 MG tablet 5 tabs PO QD x 2 days then 4 tabs PO QD x 2 days then 3 tabs PO QD x 2 days then 2 tabs PO QD x 2 days then 1 tab PO QD x 2 days 30 tablet 0    valACYclovir (VALTREX) 1000 mg tablet Take 1 tablet (1,000 mg) by mouth 3 times daily for 7 days 21 tablet 0     Current Facility-Administered Medications   Medication Dose Route Frequency Provider Last Rate Last Admin    triamcinolone (KENALOG-40) injection 40 mg  40 mg   Rick Weeks DO   40 mg at 11/04/23 0921    triamcinolone (KENALOG-40) injection 40 mg  40 mg   Rick Weeks DO   40 mg at 11/04/23 0921 6/12/2024     9:35 PM   Weight  Loss Medication History Reviewed With Patient   Which weight loss medications are you currently taking on a regular basis? Wegovy    Bupropion   Are you having any side effects from the weight loss medication that we have prescribed you? No     ASSESSMENT:   Will trial increasing Wegovy to 0.5 mg/w, Reinforced food plan.    FOLLOW-UP:    16 weeks.  I spent 15 minutes with this patient face to face and explained the conditions and plans (more than 50% of time was counseling/coordination of weight management).    Sincerely,    Kelton Gomez MD

## 2024-06-17 NOTE — NURSING NOTE
"(   Chief Complaint   Patient presents with    RECHECK     Weight management    )    ( Weight: 106.6 kg (235 lb) )  ( Height: 154.9 cm (5' 1\") )  ( BMI (Calculated): 44.4 )  (   )  ( Cumulative weight loss (lbs): -125.7 )  ( Last Visits Weight: 105.7 kg (233 lb) )  ( Wt change since last visit (lbs): 2 )  (   )  (   )    ( BP: (!) 141/92 )  (   )  (   )  (   )  ( Pulse: 79 )  (   )  ( SpO2: 94 % )    (   Patient Active Problem List   Diagnosis    HTN (hypertension), benign    CARDIOVASCULAR SCREENING; LDL GOAL LESS THAN 160    Hypertension goal BP (blood pressure) < 140/90    Vitamin D deficiency    Pruritus    Hyperphoria    Lesion of external ear    Classic migraine with aura    Urinary incontinence, unspecified incontinence type    Functional urinary incontinence    Atopic dermatitis, unspecified type    Atypical ductal hyperplasia of left breast    Mild aortic insufficiency    Non-rheumatic mitral regurgitation    Ductal carcinoma in situ (DCIS) of left breast    Morbid obesity (H)    Adjustment disorder with depressed mood    Nuclear sclerosis of both eyes    Cervical high risk HPV (human papillomavirus) test positive    Impaired fasting glucose    Chronic pain of both knees    )  (   Current Outpatient Medications   Medication Sig Dispense Refill    atorvastatin (LIPITOR) 20 MG tablet TAKE 1 TABLET (20 MG) BY MOUTH DAILY FOR CHOLESTEROL 90 tablet 3    azelastine (OPTIVAR) 0.05 % ophthalmic solution Place 1 drop into both eyes 2 times daily 6 mL 11    buPROPion (WELLBUTRIN XL) 150 MG 24 hr tablet Take 1 tablet (150 mg) by mouth every morning 90 tablet 0    losartan-hydrochlorothiazide (HYZAAR) 100-25 MG tablet TAKE 1 TABLET BY MOUTH EVERY DAY 90 tablet 3    Multiple Vitamins-Minerals (MULTIVITAMIN ADULTS PO) Take by mouth daily      Omega-3 Fatty Acids (OMEGA-3 FISH OIL PO) Take 2 g by mouth daily       omeprazole (PRILOSEC) 40 MG DR capsule TAKE 1 CAPSULE (40 MG) BY MOUTH DAILY TAKE 30-60 MINUTES BEFORE A " MEAL. 90 capsule 1    Semaglutide-Weight Management (WEGOVY) 0.25 MG/0.5ML pen Inject 0.25 mg Subcutaneous once a week 2 mL 11    tobramycin-dexamethasone (TOBRADEX) 0.3-0.1 % ophthalmic ointment Maxitrol ointment  ok substitute  same sig Apply to eyelids- 3 x day for 1 week. 3.5 g 0    triamcinolone (KENALOG) 0.1 % external cream APPLY SPARINGLY TO AFFECTED AREA TWO TIMES DAILY FOR 10-14 DAYS. 30 g 1    predniSONE (DELTASONE) 10 MG tablet 5 tabs PO QD x 2 days then 4 tabs PO QD x 2 days then 3 tabs PO QD x 2 days then 2 tabs PO QD x 2 days then 1 tab PO QD x 2 days 30 tablet 0    valACYclovir (VALTREX) 1000 mg tablet Take 1 tablet (1,000 mg) by mouth 3 times daily for 7 days 21 tablet 0    )  ( Diabetes Eval:    )    ( Pain Eval:  Mild Pain (3) )    ( Wound Eval:       )    (   History   Smoking Status    Never   Smokeless Tobacco    Never    )    ( Signed By:  Sindy Hirsch; June 17, 2024; 10:46 AM )

## 2024-06-17 NOTE — TELEPHONE ENCOUNTER
Patient confirmed scheduled appointment:  Date: 11/4/24  Time: 9:45 am  Visit type: JODIE CALLEJAS  Provider: Dr Gomez  Location: virtual  Testing/imaging: n/a  Additional notes: n/a

## 2024-06-17 NOTE — LETTER
"2024       RE: Rizwana Aguirre  [address]  [facility] [address]     Dear Colleague,    Thank you for referring your patient, Rizwana Aguirre, to the [facility] WEIGHT MANAGEMENT CLINIC [place] at [facility]. Please see a copy of my visit note below.        Return Medical Weight Management Note     Rizwana Aguirre  MRN:  [id number]  :  1965  YUSEF:  2024    Dear Kristine Marie MD,    I had the pleasure of seeing your patient Rizwana Aguirre.  She is a 58 year old female who I am continuing to see for treatment of obesity related to:      2024     3:46 PM   --   I have the following health issues associated with obesity High Blood Pressure    High Cholesterol    Sleep Apnea    GERD (Reflux)    Asthma   I have the following symptoms associated with obesity Knee Pain    Fatigue    Hip Pain     CURRENT WEIGHT:   235 lbs 0 oz    Wt Readings from Last 4 Encounters:   24 106.6 kg (235 lb)   24 109 kg (240 lb 6.4 oz)   24 105.7 kg (233 lb)   24 109.3 kg (241 lb)     Height:  5' 1\"  Body Mass Index:  Body mass index is 44.4 kg/m .  Vitals:  B/P: 141/92, P: 79    Initial consult weight was 240 on 24.  Weight change since last seen on 24 is down 5 pounds.   Total loss is 6 pounds.    INTERVAL HISTORY:  Just able to start Wegovy 0.2/w 3 wks ago due to shortage. Feels early satiety and reduced need for late night snacking.         2024     3:46 PM   Diet Recall Review with Patient   If you do eat breakfast, what types of food do you eat? Eggs, cottage cheese, fruit, cheese, toast   If you do eat lunch, what types of food do you typically eat? High protein, high vegetable, low carb   If you do eat supper, what types of food do you typically eat? Chicken, vegetable and starch   If you do snack, what types of food do you typically eat? Cheese, granola bar, dark chocolate   How many glasses of juice do you drink in a " typical day? 0   How many of glasses of milk do you drink in a typical day? 0   How many 8oz glasses of sugar containing drinks such as Jace-Aid/sweet tea do you drink in a day? 0   How many cans/bottles of sugar pop/soda/tea/sports drinks do you drink in a day? 0   How many cans/bottles of diet pop/soda/tea or sports drink do you drink in a day? 3   How often do you have a drink of alcohol? 2-3 TImes a Week   If you do drink, how many drinks might you have in a day? 3-4     MEDICATIONS:   Current Outpatient Medications   Medication Sig Dispense Refill    atorvastatin (LIPITOR) 20 MG tablet TAKE 1 TABLET (20 MG) BY MOUTH DAILY FOR CHOLESTEROL 90 tablet 3    azelastine (OPTIVAR) 0.05 % ophthalmic solution Place 1 drop into both eyes 2 times daily 6 mL 11    buPROPion (WELLBUTRIN XL) 150 MG 24 hr tablet Take 1 tablet (150 mg) by mouth every morning 90 tablet 0    losartan-hydrochlorothiazide (HYZAAR) 100-25 MG tablet TAKE 1 TABLET BY MOUTH EVERY DAY 90 tablet 3    Multiple Vitamins-Minerals (MULTIVITAMIN ADULTS PO) Take by mouth daily      Omega-3 Fatty Acids (OMEGA-3 FISH OIL PO) Take 2 g by mouth daily       omeprazole (PRILOSEC) 40 MG DR capsule TAKE 1 CAPSULE (40 MG) BY MOUTH DAILY TAKE 30-60 MINUTES BEFORE A MEAL. 90 capsule 1    Semaglutide-Weight Management (WEGOVY) 0.25 MG/0.5ML pen Inject 0.25 mg Subcutaneous once a week 2 mL 11    tobramycin-dexamethasone (TOBRADEX) 0.3-0.1 % ophthalmic ointment Maxitrol ointment  ok substitute  same sig Apply to eyelids- 3 x day for 1 week. 3.5 g 0    triamcinolone (KENALOG) 0.1 % external cream APPLY SPARINGLY TO AFFECTED AREA TWO TIMES DAILY FOR 10-14 DAYS. 30 g 1    predniSONE (DELTASONE) 10 MG tablet 5 tabs PO QD x 2 days then 4 tabs PO QD x 2 days then 3 tabs PO QD x 2 days then 2 tabs PO QD x 2 days then 1 tab PO QD x 2 days 30 tablet 0    valACYclovir (VALTREX) 1000 mg tablet Take 1 tablet (1,000 mg) by mouth 3 times daily for 7 days 21 tablet 0     Current  Facility-Administered Medications   Medication Dose Route Frequency Provider Last Rate Last Admin    triamcinolone (KENALOG-40) injection 40 mg  40 mg   Rick Weeks DO   40 mg at 11/04/23 0921    triamcinolone (KENALOG-40) injection 40 mg  40 mg   Rick Weeks DO   40 mg at 11/04/23 0921         6/12/2024     9:35 PM   Weight Loss Medication History Reviewed With Patient   Which weight loss medications are you currently taking on a regular basis? Wegovy    Bupropion   Are you having any side effects from the weight loss medication that we have prescribed you? No     ASSESSMENT:   Will trial increasing Wegovy to 0.5 mg/w, Reinforced food plan.    FOLLOW-UP:    16 weeks.  I spent 15 minutes with this patient face to face and explained the conditions and plans (more than 50% of time was counseling/coordination of weight management).    Sincerely,    Kelton Gomez MD

## 2024-06-20 ENCOUNTER — VIRTUAL VISIT (OUTPATIENT)
Dept: ENDOCRINOLOGY | Facility: CLINIC | Age: 59
End: 2024-06-20
Payer: COMMERCIAL

## 2024-06-20 DIAGNOSIS — Z71.3 NUTRITIONAL COUNSELING: Primary | ICD-10-CM

## 2024-06-20 DIAGNOSIS — E66.9 OBESITY: ICD-10-CM

## 2024-06-20 PROCEDURE — 99207 PR NO CHARGE LOS: CPT | Mod: 95

## 2024-06-20 PROCEDURE — 97803 MED NUTRITION INDIV SUBSEQ: CPT | Mod: 95

## 2024-06-20 NOTE — PATIENT INSTRUCTIONS
Sylvain Guthrie,     Follow-up with RD in 1-2 months or as needed.     Thank you,    Indira Porter, HARI, LD  If you would like to schedule or reschedule an appointment with the RD, please call [phone]    Nutrition Goals  Continue to prioritize protein at meals.   Continue to incorporate fiber in diet to help assist with preventing constipation  Continue to increase water intake, aim for 64 oz of water daily.     COMPREHENSIVE WEIGHT MANAGEMENT PROGRAM  VIRTUAL SUPPORT GROUPS    At [facility], our Comprehensive Weight Management program offers on-line support groups for patients who are working on weight loss and considering, preparing for, or have had weight loss surgery.     There is no cost for this opportunity.  You are invited to attend the?Virtual Support Groups?provided by any of the following locations:    [facility] via Microsoft Teams with Kimber Pyle RN  2.   [place] via [employer] with Romero Black, PhD, LP  3.   [place] via [employer] with Debbie Long RN  4.   [facility] via a Zoom Meeting with REBECCA Garcia    The following Support Group information can also be found on our website:  [email or web address]      [facility] Weight Loss Surgery Support Group  The support group is a patient-lead forum that meets monthly to share experiences, encouragement and education. It is open to those who have had weight loss surgery, are scheduled for surgery, or are considering surgery.   WHEN: This group meets on the 3rd Wednesday of each month from 5:00PM - 6:00PM virtually using [employer] Teams.   FACILITATOR: Led by Kimber Ray RD, LOUIS, RN, the program's Clinical Coordinator.   TO REGISTER: Please contact the clinic via "[employer]" or call the nurse line directly at [phone] to inform our staff that you would like an invite sent to you and to let us know the email you would like  "the invite sent to. Prior to the meeting, a link with directions on how to join the meeting will be sent to you.    2024 Meetings   January 17  February 21  March 20  April 17  May 15  Jany 19      [facility] Bariatric Care Support Group?  This is open to all pre- and post- operative bariatric surgery patients as well as their support system.   WHEN: This group meets the 3rd Tuesday of each month from 6:30 PM - 8:00 PM virtually using Microsoft Teams.   FACILITATOR: Led by Romero Black, Ph.D who is a Licensed Psychologist with the [facility] Comprehensive Weight Management Program.   TO REGISTER: Please send an email to Romero Black, Ph.D., LP at?[email or web address]?if you would like an invitation to the group. Prior to the meeting, a link with directions on how to join the meeting will be sent to you.    2024 Meetings January 16: \"Medication Management and Bariatric Surgery\", Lor Bishop, PharmD, Pharmacy Resident at [facility]  February 20: \"A Bariatric Surgery Patient's Perspective\", EZEQUIEL Pena, [facility], Behavioral Health Clinician at [facility]  March 19  April 16  May 21  Jany 18: \"Nutritional Labeling\", Dietitian speaker to be announced.  November 19: \"Holiday Eating\", Dietitian speaker to be announced.    [facility] Post-Operative Bariatric Surgery Support Group  This is a support group for [facility] bariatric patients (and those external to [facility]) who have had bariatric surgery and are at least 3 months post-surgery.  WHEN: This support group meets the 4th Wednesday of the month from 11:00 AM - 12:00 PM virtually using Microsoft Teams.   FACILITATOR: Led by Certified Bariatric Nurse, Debbie Long RN.   TO REGISTER: Please send an email to Debbie at [email or web address] if you would like an invitation to " "the group.  Prior to the meeting, a link with directions on how to join the meeting will be sent to you.    2024 Meetings  January 24  February 28  March 27  April 24  May 22  Jnay 26    [facility] Healthy Lifestyle Group?  This is a 60 minute virtual coaching group for those who want to lead a healthier lifestyle. Come together to set goals and overcome barriers in a supportive group environment. We will address the four pillars of health: nutrition, exercise, sleep and emotional well-being.  This group is highly recommended for those who are participating in the 24 week Healthy Lifestyle Plan and our Health Coaching sessions.  WHEN: This group meets the 1st Friday of the month, 12:30 PM - 1:30 PM online, via a zoom meeting.    FACILITATOR: Led by National Board Certified Health and , Debbie Gunter [facility].   TO REGISTER: Please call the Call Center at [phone] to register. You will get an appointment to attend in [facility]. Fifteen minutes prior to the meeting, complete the e-check in and you will get the link to join the meeting.    There is no charge to attend this group and space is limited.     2024 Meetings  January 5: \"New Years Vision: Manifest your Best 2024!\" (guided imagery,  journaling and discussion)  February 2: \"Let's Talk\"  March 1: \"10 Percent Happier\" by Scar Montoya (Book Bites - a guided discussion on the nuggets of wisdom from favorite wellness books, no need to read the book but highly encouraged)  April 5: \"Let's Talk\"  May 3: \"Essentialism: The Disciplined Pursuit of Less\" by Lambert Green (Book Bites discussion)  June 7: \"Let's Talk\"  July 5: NO MEETING, off for the 4th of July Holiday  August 2: \"The Blue Zones, Secrets for Living a Longer Life\" by Scar Mendez (Book Bites discussion)        "

## 2024-06-20 NOTE — NURSING NOTE
Is the patient currently in the state of [place]? YES    Visit mode:VIDEO    If the visit is dropped, the patient can be reconnected by: VIDEO VISIT: Text to cell phone:   Telephone Information:   Mobile [phone]    and VIDEO VISIT: Send to e-mail at: [email or web address]    Will anyone else be joining the visit? NO  (If patient encounters technical issues they should call [phone]    How would you like to obtain your AVS? MyChart    Are changes needed to the allergy or medication list? No    Are refills needed on medications prescribed by this physician? NO    Reason for visit: MARY NICHOLE

## 2024-06-20 NOTE — LETTER
"2024       RE: Rizwana Aguirre  [address]  [facility] [address]     Dear Colleague,    Thank you for referring your patient, Rizwana Aguirre, to the [facility] WEIGHT MANAGEMENT CLINIC [facility]. Please see a copy of my visit note below.    Video-Visit Details    Type of service:  Video Visit    Video Start Time: 2:01 PM  Video End Time: 2:19 PM     Originating Location (pt. Location): Other - work      Distant Location (provider location):  Offsite (providers home)     Platform used for Video Visit: [employer]       Weight Management Nutrition Consultation    Rizwana Aguirre is a 58 year old female presents today for weight management nutrition consultation.  Patient referred by Dr. Gomez on 2024.    Patient with Co-morbidities of obesity includin/12/2024     3:46 PM   --   I have the following health issues associated with obesity High Blood Pressure    High Cholesterol    Sleep Apnea    GERD (Reflux)    Asthma   I have the following symptoms associated with obesity Knee Pain    Fatigue    Hip Pain     Anthropometrics:  Weight 24: 242 lbs (clinic weight)     Estimated body mass index is 44.4 kg/m  as calculated from the following:    Height as of 24: 1.549 m (5' 1\").    Weight as of 24: 106.6 kg (235 lb).    Current weight: 235 lbs, pt report     Medications for Weight Loss:  Wegovy 0.25 mg, just took her 4th shot - started 3 week ago due to shortage. Feels early satiety and reduced need for late night snacking.     NUTRITION HISTORY  Food allergies: Shellfish, Walnuts, Cantaloupe   Previous methods of diet modification for weight loss: WW (intermittently helped), watching portions/kcal, exercise, atikins type diet, GOLO  RD before: Yes, at [facility]    Feels very knowledgeable in nutrition but can be hard to take action. Stress is a big trigger. Does well with planning meals ahead of " time    Pt goals: support with decreasing snacking and around planning meals. Increase movement     Eats 3 meals/day  Typical food choices  B - egg with toast, cheese and butter OR cottage cheese/yogurt  L - protein, vegetable, cheese and small dove chocolate   D - starch, vegetable, protein   Snacks - kind bar, cucumbers/tomatoes, carrots, cheese    Craves sweets, peanuts and cheese    Hydration: diet coke, la croix, water, tea, alcohol on weekends    PA: limited by knee injury - currently in PT    April 2024:    Has not been able to get Wegovy yet due to shortages.     Was in [place] last month for a week. Ate differently while there. Has been getting hello fresh and doing Easter leftovers the past two weeks now that she is home. Was doing more meal planning prior to [place].    Really likes cheese - wants to know how much is okay to eat. Discussed saturated vs unsaturated fat.     Hydration: not drinking as much water as she should per pt   Barrier: bathroom is far away from office when in person     PA: wants to walk more and recommended by PT as well   - Finished with PT appts for time being    June 2024: First time meeting patient, previously patient of Starr Vargas RD. Eats breakfast around 6:30-7 AM, cottage cheese OR eggs. Lunch is around noon. Whole wheat pasta with spinach, mozzarella and chicken. Does well with protein intake (prioritizes this at meals), fruits and vegetables. Dealt with shingles this past month so that made it difficult to work on some of the goals she was working on but is feeling better now. Feels her physical activity has decreased a little due to the weather not being consistently nice but has had some doing some bike rides.     Previous goals:  Recommend utilizing hunger/satiety scale - can check in more next appointment - met  Mindful with snack portions/choices - met   Continue aiming to meal plan on Saturday mornings - met  Consider being thoughtful about hydration times (example:  utilizing bathroom during lunch) - met  Aim to walk 4x/week as able/tolerated -not consistently met    Additional information:  FT - Manager, Learning & Development    Lives with        2/12/2024     3:46 PM   Diet Recall Review with Patient   If you do eat breakfast, what types of food do you eat? Eggs, cottage cheese, fruit, cheese, toast   If you do eat lunch, what types of food do you typically eat? High protein, high vegetable, low carb   If you do eat supper, what types of food do you typically eat? Chicken, vegetable and starch   If you do snack, what types of food do you typically eat? Cheese, granola bar, dark chocolate   How many glasses of juice do you drink in a typical day? 0   How many of glasses of milk do you drink in a typical day? 0   How many 8oz glasses of sugar containing drinks such as Jace-Aid/sweet tea do you drink in a day? 0   How many cans/bottles of sugar pop/soda/tea/sports drinks do you drink in a day? 0   How many cans/bottles of diet pop/soda/tea or sports drink do you drink in a day? 3   How often do you have a drink of alcohol? 2-3 TImes a Week   If you do drink, how many drinks might you have in a day? 3-4           2/12/2024     3:46 PM   Eating Habits   Generally, my meals include foods like these bread, pasta, rice, potatoes, corn, crackers, sweet dessert, pop, or juice A Few Times a Week   Generally, my meals include foods like these fried meats, brats, burgers, french fries, pizza, cheese, chips, or ice cream Once a Week   Eat fast food (like McDonalds, [employer]re, Taco Bell) Once a Week   Eat at a buffet or sit-down restaurant Once a Week   Eat most of my meals in front of the TV or computer Less Than Weekly   Often skip meals, eat at random times, have no regular eating times Never   Rarely sit down for a meal but snack or graze throughout Never   Eat extra snacks between meals Almost Everyday   Eat most of my food at the end of the day Never   Eat in the middle of  the night or wake up at night to eat Never   Eat extra snacks to prevent or correct low blood sugar Never   Eat to prevent acid reflux or stomach pain Never   Worry about not having enough food to eat Never   I eat when I am depressed Less Than Weekly   I eat when I am stressed Once a Week   I eat when I am bored A Few Times a Week   I eat when I am anxious A Few Times a Week   I eat when I am happy or as a reward A Few Times a Week   I feel hungry all the time even if I just have eaten Less Than Weekly   Feeling full is important to me Almost Everyday   I finish all the food on my plate even if I am already full A Few Times a Week   I can't resist eating delicious food or walk past the good food/smell A Few Times a Week   I eat/snack without noticing that I am eating A Few Times a Week   I eat when I am preparing the meal Once a Week   I eat more than usual when I see others eating Less Than Weekly   I have trouble not eating sweets, ice cream, cookies, or chips if they are around the house A Few Times a Week   I think about food all day A Few Times a Week   What foods, if any, do you crave? Cheese           2/12/2024     3:46 PM   Amount of Food   I feel out of control when eating Never   I eat a large amount of food, like a loaf of bread, a box of cookies, a pint/quart of ice cream, all at once Never   I eat a large amount of food even when I am not hungry Monthly   I eat rapidly Monthly   I eat alone because I feel embarrassed and do not want others to see how much I have eaten Never   I eat until I am uncomfortably full Monthly   I feel bad, disgusted, or guilty after I overeat Monthly           2/12/2024     3:46 PM   Activity/Exercise History   How much of a typical 12 hour day do you spend sitting? Most of the Day   How much of a typical 12 hour day do you spend lying down? Less Than Half the Day   How much of a typical day do you spend walking/standing? Less Than Half the Day   How many hours (not including  work) do you spend on the TV/Video Games/Computer/Tablet/Phone? 2-3 Hours   How many times a week are you active for the purpose of exercise? Never   What keeps you from being more active? Pain    Lack of Time    Too tired   How many total minutes do you spend doing some activity for the purpose of exercising when you exercise? 15-30 Minutes     Nutrition Prescription  Recommended energy/nutrient modification.    Nutrition Diagnosis  Obesity r/t long history of positive energy balance aeb BMI >30.    Nutrition Intervention  Reviewed and modified plan  Answered pt questions  Coordination of care   Nutrition education: Provided education on nutrition considerations when starting GLP1 medication including, changes in meal/snack volumes; meeting protein, hydration and micronutrient needs; limiting high-fat foods; and diet/lifestyle strategies for preventing constipation.  AVS and handouts via [email or web address]    Expected Engagement: good    Nutrition Goals/Resources:  Continue to prioritize protein at meals.   Continue to incorporate fiber in diet to help assist with preventing constipation  Continue to increase water intake, aim for 64 oz of water daily.     Follow-Up: 1-2 months or as needed.     Time spent with patient: 18 minutes.    Indira Porter RD, LD

## 2024-06-20 NOTE — PROGRESS NOTES
"Video-Visit Details    Type of service:  Video Visit    Video Start Time: 2:01 PM  Video End Time: 2:19 PM     Originating Location (pt. Location): Other - work      Distant Location (provider location):  Offsite (providers home)     Platform used for Video Visit: [employer]       Weight Management Nutrition Consultation    Rizwana Aguirre is a 58 year old female presents today for weight management nutrition consultation.  Patient referred by Dr. Gomez on 2024.    Patient with Co-morbidities of obesity includin/12/2024     3:46 PM   --   I have the following health issues associated with obesity High Blood Pressure    High Cholesterol    Sleep Apnea    GERD (Reflux)    Asthma   I have the following symptoms associated with obesity Knee Pain    Fatigue    Hip Pain     Anthropometrics:  Weight 24: 242 lbs (clinic weight)     Estimated body mass index is 44.4 kg/m  as calculated from the following:    Height as of 24: 1.549 m (5' 1\").    Weight as of 24: 106.6 kg (235 lb).    Current weight: 235 lbs, pt report     Medications for Weight Loss:  Wegovy 0.25 mg, just took her 4th shot - started 3 week ago due to shortage. Feels early satiety and reduced need for late night snacking.     NUTRITION HISTORY  Food allergies: Shellfish, Walnuts, Cantaloupe   Previous methods of diet modification for weight loss: WW (intermittently helped), watching portions/kcal, exercise, atikins type diet, GOLO  RD before: Yes, at part of [facility] program    Feels very knowledgeable in nutrition but can be hard to take action. Stress is a big trigger. Does well with planning meals ahead of time    Pt goals: support with decreasing snacking and around planning meals. Increase movement     Eats 3 meals/day  Typical food choices  B - egg with toast, cheese and butter OR cottage cheese/yogurt  L - protein, vegetable, cheese and small dove chocolate   D - starch, vegetable, protein   Snacks - kind bar, " cucumbers/tomatoes, carrots, cheese    Craves sweets, peanuts and cheese    Hydration: diet coke, la croix, water, tea, alcohol on weekends    PA: limited by knee injury - currently in PT    April 2024:    Has not been able to get Wegovy yet due to shortages.     Was in [place] last month for a week. Ate differently while there. Has been getting hello fresh and doing Easter leftovers the past two weeks now that she is home. Was doing more meal planning prior to [place].    Really likes cheese - wants to know how much is okay to eat. Discussed saturated vs unsaturated fat.     Hydration: not drinking as much water as she should per pt   Barrier: bathroom is far away from office when in person     PA: wants to walk more and recommended by PT as well   - Finished with PT appts for time being    June 2024: First time meeting patient, previously patient of Starr Vargas RD. Eats breakfast around 6:30-7 AM, cottage cheese OR eggs. Lunch is around noon. Whole wheat pasta with spinach, mozzarella and chicken. Does well with protein intake (prioritizes this at meals), fruits and vegetables. Dealt with shingles this past month so that made it difficult to work on some of the goals she was working on but is feeling better now. Feels her physical activity has decreased a little due to the weather not being consistently nice but has had some doing some bike rides.     Previous goals:  Recommend utilizing hunger/satiety scale - can check in more next appointment - met  Mindful with snack portions/choices - met   Continue aiming to meal plan on Saturday mornings - met  Consider being thoughtful about hydration times (example: utilizing bathroom during lunch) - met  Aim to walk 4x/week as able/tolerated -not consistently met    Additional information:  FT - Manager, [employer]    Lives with        2/12/2024     3:46 PM   Diet Recall Review with Patient   If you do eat breakfast, what types of food do you eat? Eggs,  cottage cheese, fruit, cheese, toast   If you do eat lunch, what types of food do you typically eat? High protein, high vegetable, low carb   If you do eat supper, what types of food do you typically eat? Chicken, vegetable and starch   If you do snack, what types of food do you typically eat? Cheese, granola bar, dark chocolate   How many glasses of juice do you drink in a typical day? 0   How many of glasses of milk do you drink in a typical day? 0   How many 8oz glasses of sugar containing drinks such as Jace-Aid/sweet tea do you drink in a day? 0   How many cans/bottles of sugar pop/soda/tea/sports drinks do you drink in a day? 0   How many cans/bottles of diet pop/soda/tea or sports drink do you drink in a day? 3   How often do you have a drink of alcohol? 2-3 TImes a Week   If you do drink, how many drinks might you have in a day? 3-4           2/12/2024     3:46 PM   Eating Habits   Generally, my meals include foods like these bread, pasta, rice, potatoes, corn, crackers, sweet dessert, pop, or juice A Few Times a Week   Generally, my meals include foods like these fried meats, brats, burgers, french fries, pizza, cheese, chips, or ice cream Once a Week   Eat fast food (like McDonalds, Burger Tigre, Taco Bell) Once a Week   Eat at a buffet or sit-down restaurant Once a Week   Eat most of my meals in front of the TV or computer Less Than Weekly   Often skip meals, eat at random times, have no regular eating times Never   Rarely sit down for a meal but snack or graze throughout Never   Eat extra snacks between meals Almost Everyday   Eat most of my food at the end of the day Never   Eat in the middle of the night or wake up at night to eat Never   Eat extra snacks to prevent or correct low blood sugar Never   Eat to prevent acid reflux or stomach pain Never   Worry about not having enough food to eat Never   I eat when I am depressed Less Than Weekly   I eat when I am stressed Once a Week   I eat when I am bored  A Few Times a Week   I eat when I am anxious A Few Times a Week   I eat when I am happy or as a reward A Few Times a Week   I feel hungry all the time even if I just have eaten Less Than Weekly   Feeling full is important to me Almost Everyday   I finish all the food on my plate even if I am already full A Few Times a Week   I can't resist eating delicious food or walk past the good food/smell A Few Times a Week   I eat/snack without noticing that I am eating A Few Times a Week   I eat when I am preparing the meal Once a Week   I eat more than usual when I see others eating Less Than Weekly   I have trouble not eating sweets, ice cream, cookies, or chips if they are around the house A Few Times a Week   I think about food all day A Few Times a Week   What foods, if any, do you crave? Cheese           2/12/2024     3:46 PM   Amount of Food   I feel out of control when eating Never   I eat a large amount of food, like a loaf of bread, a box of cookies, a pint/quart of ice cream, all at once Never   I eat a large amount of food even when I am not hungry Monthly   I eat rapidly Monthly   I eat alone because I feel embarrassed and do not want others to see how much I have eaten Never   I eat until I am uncomfortably full Monthly   I feel bad, disgusted, or guilty after I overeat Monthly           2/12/2024     3:46 PM   Activity/Exercise History   How much of a typical 12 hour day do you spend sitting? Most of the Day   How much of a typical 12 hour day do you spend lying down? Less Than Half the Day   How much of a typical day do you spend walking/standing? Less Than Half the Day   How many hours (not including work) do you spend on the TV/Video Games/Computer/Tablet/Phone? 2-3 Hours   How many times a week are you active for the purpose of exercise? Never   What keeps you from being more active? Pain    Lack of Time    Too tired   How many total minutes do you spend doing some activity for the purpose of exercising  when you exercise? 15-30 Minutes     Nutrition Prescription  Recommended energy/nutrient modification.    Nutrition Diagnosis  Obesity r/t long history of positive energy balance aeb BMI >30.    Nutrition Intervention  Reviewed and modified plan  Answered pt questions  Coordination of care   Nutrition education: Provided education on nutrition considerations when starting GLP1 medication including, changes in meal/snack volumes; meeting protein, hydration and micronutrient needs; limiting high-fat foods; and diet/lifestyle strategies for preventing constipation.  AVS and handouts via [employer]    Expected Engagement: good    Nutrition Goals/Resources:  Continue to prioritize protein at meals.   Continue to incorporate fiber in diet to help assist with preventing constipation  Continue to increase water intake, aim for 64 oz of water daily.     Follow-Up: 1-2 months or as needed.     Time spent with patient: 18 minutes.    Indira Porter RD, LD

## 2024-06-28 DIAGNOSIS — E66.01 MORBID OBESITY (H): ICD-10-CM

## 2024-06-29 ENCOUNTER — HEALTH MAINTENANCE LETTER (OUTPATIENT)
Age: 59
End: 2024-06-29

## 2024-07-13 ENCOUNTER — OFFICE VISIT (OUTPATIENT)
Dept: ORTHOPEDICS | Facility: CLINIC | Age: 59
End: 2024-07-13
Payer: COMMERCIAL

## 2024-07-13 DIAGNOSIS — M17.0 BILATERAL PRIMARY OSTEOARTHRITIS OF KNEE: Primary | ICD-10-CM

## 2024-07-13 PROCEDURE — 20610 DRAIN/INJ JOINT/BURSA W/O US: CPT | Mod: 50 | Performed by: FAMILY MEDICINE

## 2024-07-13 PROCEDURE — 99214 OFFICE O/P EST MOD 30 MIN: CPT | Mod: 25 | Performed by: FAMILY MEDICINE

## 2024-07-13 RX ORDER — TRIAMCINOLONE ACETONIDE 40 MG/ML
40 INJECTION, SUSPENSION INTRA-ARTICULAR; INTRAMUSCULAR
Status: SHIPPED | OUTPATIENT
Start: 2024-07-13

## 2024-07-13 RX ADMIN — TRIAMCINOLONE ACETONIDE 40 MG: 40 INJECTION, SUSPENSION INTRA-ARTICULAR; INTRAMUSCULAR at 09:47

## 2024-07-13 ASSESSMENT — PAIN SCALES - GENERAL: PAINLEVEL: MILD PAIN (3)

## 2024-07-13 NOTE — NURSING NOTE
Chief Complaint   Patient presents with    Left Knee - Pain, Follow Up    Right Knee - Pain, Follow Up       There were no vitals filed for this visit.    There is no height or weight on file to calculate BMI.      YUDITH Maria NREMT

## 2024-07-13 NOTE — PROGRESS NOTES
ESTABLISHED PATIENT FOLLOW-UP:  No chief complaint on file.       HISTORY OF PRESENT ILLNESS  Ms. Aguirre is a pleasant 58 year old year old female who presents to clinic today for follow-up of Bilateral knee pain    Date of injury: 202  Date last seen: 11/4/2023  Following Therapeutic Plan: PT  Pain: Yes efraín right knee 3/10 but worse on stairs  Function: Improved for a few months but has since had increasing pain and debility with walking.   Interval History: Jerri has been trying to embark on a weight loss journey.  She is now on Wegovy for the past 6 weeks and has lost 6 pounds.  She would like to walk and now has an e-bike, stationary bike at home as well.  She has completed physical therapy throughout the winter and her last session where she graduated was on 3/25/2024.  This was helpful for strengthening.    She is also undergoing nutritional counseling.  She is seeing endocrinology and is on Wegovy.    She has utilized NSAIDs at home in the form of ibuprofen and takes this as needed for flares of greater pain.    Additional medical/Social/Surgical histories reviewed in [facility] and updated as appropriate.    REVIEW OF SYSTEMS (7/13/2024)  CONSTITUTIONAL: Denies fever and weight loss  GASTROINTESTINAL: Denies abdominal pain, nausea, vomiting  MUSCULOSKELETAL: See HPI  SKIN: Denies any recent rash or lesion  NEUROLOGICAL: Denies numbness or focal weakness     PHYSICAL EXAM  LMP 02/01/2017 (Approximate)     General  - normal appearance, in no obvious distress  Musculoskeletal - bilateral knee  - stance: mildly antalgic gait  - inspection: no effusion, mild quad atrophy  - palpation: medial joint line tenderness bilaterally  - ROM: 120 degrees flexion, 0 degrees extension, painful terminal flexion  - strength: 5/5 in flexion, 4/5 in extension   - special tests:  (-) varus at 0 and 30 degrees flexion  (-) valgus at 0 and 30 degrees flexion  Neuro  - no sensory or motor deficit, grossly normal coordination, normal muscle  tone       IMAGING : .  XR KNEE STANDING BILATERAL 3 VIEWS 4/12/23     XR KNEE STANDING BILATERAL 3 VIEWS 4/12/2023 3:33 PM      HISTORY: Chronic pain of both knees; Chronic pain of both knees;  Chronic pain of both knees     COMPARISON: None.                                                                          IMPRESSION: Degenerative narrowing medial compartment of both knees.  Both knees negative for fracture. No significant effusion. Mild  degenerative change patellofemoral compartment both knees.     CAROL GARCIA MD     Independent interpretation, there is at least moderate medial compartment narrowing of both knees.  Left slightly greater than right with tibial osteophyte.  Mild patellofemoral degenerative change.     ASSESSMENT & PLAN  Ms. Aguirre is a 58 year old year old female who presents to clinic today for follow-up regarding bilateral knee pain secondary to moderate osteoarthritis of medial compartment of bilateral knees.    Diagnosis: Primary osteoarthritis of bilateral knees    We discussed treatment options again today.  Corticosteroid injections were beneficial, not completely relieving her pain, but for at least 3 months.  I applauded her on her weight loss efforts and I do believe this will be greatly beneficial once she is able to continue her weight loss journey.  I encouraged her to perform low-impact exercise to supplement some of her walking in the form of stationary or bike riding.    We discussed options for MRI as she was interested in discussing this.  I discussed that MRI may show greater osteoarthritic changes of her medial compartments, but would not likely be helpful in the current management strategy given she is not interested in pursuing surgical intervention at this time.  We did bring up the idea that partial knee arthroplasty or total knee arthroplasty could be in her future if the injections are not providing satisfactory relief, and weight loss is also not providing  adequate relief to her knees.    In regards to injections, we discussed repeating corticosteroid injection today to reduce acute component of her pain and further her weight loss goals this summer allowing for increased cardiovascular activity.  Additionally I would like to investigate whether hyaluronic acid would be covered by her insurance.  She has completed physical therapy course in 2023, she has utilized NSAIDs, she has had a corticosteroid injection with incomplete relief, as well as attempted activity modification.    Briefly discussed bracing in the form of  braces.  We agreed to hold off on pursuing this.    I will see her back in 6 to 8 weeks for hyaluronic acid injection and I would like to do this prior to weaning effects of her corticosteroid.    PROCEDURE    Bilateral Knee Injection - Intraarticular  The patient was informed of the risks and the benefits of the procedure and a written consent was signed.  The patient s left knee was prepped with chlorhexidine in sterile fashion.   40 mg of triamcinolone suspension was drawn up into a 5 mL syringe with 4 mL of 1% lidocaine.  Injection was performed using substerile technique.  A 1.5-inch 22-gauge needle was used to enter the anterolateral aspect of the left knee.  Injection performed successfully without difficulty.  There were no complications. The patient tolerated the procedure well. There was negligible bleeding.   This procedure as above was repeated for the right knee.  The patient s right knee was prepped with chlorhexidine in sterile fashion. Injection was performed using substerile technique.  A 1.5-inch 22-gauge needle was used to enter the anterolateral aspect of the right knee.  Injection performed successfully without difficulty.  There were no complications. The patient tolerated the procedure well. There was negligible bleeding.   The patient was instructed to ice the knee upon leaving clinic and refrain from overuse over the  next 3 days.   The patient was instructed to call or go to the emergency room with any unusual pain, swelling, redness, or if otherwise concerned.  A follow up appointment will be scheduled to evaluate response to the injection, and to assess range of motion and pain.   It was a pleasure seeing Ailyn Weeks DO, [facility]  Primary Care Sports Medicine      Large Joint Injection/Arthocentesis: bilateral knee    Date/Time: 7/13/2024 9:47 AM    Performed by: Rick Weeks DO  Authorized by: Rick Weeks DO    Indications:  Pain  Needle Size:  22 G  Guidance: landmark guided    Approach:  Lateral  Location:  Knee  Laterality:  Bilateral      Medications (Right):  40 mg triamcinolone 40 MG/ML  Medications (Left):  40 mg triamcinolone 40 MG/ML  Outcome:  Tolerated well, no immediate complications  Procedure discussed: discussed risks, benefits, and alternatives    Consent Given by:  Patient  Timeout: timeout called immediately prior to procedure    Prep: patient was prepped and draped in usual sterile fashion

## 2024-07-13 NOTE — NURSING NOTE
[facility]   ORTHOPEDICS & SPORTS MEDICINE  [address] [place] [address]  Dept: [phone]  ______________________________________________________________________________    Patient: Rizwana Aguirre   : 1965   MRN: [id number]   2024    INVASIVE PROCEDURE SAFETY CHECKLIST    Date: 2024   Procedure: Bilateral knee injections  Patient Name: Rizwana Aguirre  MRN: [id number]  YOB: 1965    Action: Complete sections as appropriate. Any discrepancy results in a HARD COPY until resolved.     PRE PROCEDURE:  Patient ID verified with 2 identifiers (name and  or MRN): Yes  Procedure and site verified with patient/designee (when able): Yes  Accurate consent documentation in medical record: Yes  H&P (or appropriate assessment) documented in medical record: Yes  H&P must be up to 20 days prior to procedure and updates within 24 hours of procedure as applicable: NA  Relevant diagnostic and radiology test results appropriately labeled and displayed as applicable: NA  Procedure site(s) marked with provider initials: NA    TIMEOUT:  Time-Out performed immediately prior to starting procedure, including verbal and active participation of all team members addressing the following:Yes  * Correct patient identify  * Confirmed that the correct side and site are marked  * An accurate procedure consent form  * Agreement on the procedure to be done  * Correct patient position  * Relevant images and results are properly labeled and appropriately displayed  * The need to administer antibiotics or fluids for irrigation purposes during the procedure as applicable   * Safety precautions based on patient history or medication use    DURING PROCEDURE: Verification of correct person, site, and procedures any time the responsibility for care of the patient is transferred to another member of the care team.       Prior to injection, verified patient identity using patient's name and date of  birth.  Due to injection administration, patient instructed to remain in clinic for 15 minutes  afterwards, and to report any adverse reaction to me immediately.    Joint injection was performed.      Drug Amount Wasted:  None.  Vial/Syringe: Single dose vial  Expiration Date:  3/31/2024      Crow Lin, EMT  July 13, 2024

## 2024-07-13 NOTE — LETTER
7/13/2024      Rizwana Aguirre  [address] [address]      Dear Colleague,    Thank you for referring your patient, Rizwana Aguirre, to the [facility] SP[facility]TS MEDICINE CLINIC [place]. Please see a copy of my visit note below.    ESTABLISHED PATIENT FOLLOW-UP:  No chief complaint on file.       HISTORY OF PRESENT ILLNESS  Ms. Augirre is a pleasant 58 year old year old female who presents to clinic today for follow-up of Bilateral knee pain    Date of injury: 202  Date last seen: 11/4/2023  Following Therapeutic Plan: PT  Pain: Yes efraín right knee 3/10 but worse on stairs  Function: Improved for a few months but has since had increasing pain and debility with walking.   Interval History: Jerri has been trying to embark on a weight loss journey.  She is now on Wegovy for the past 6 weeks and has lost 6 pounds.  She would like to walk and now has an e-bike, stationary bike at home as well.  She has completed physical therapy throughout the winter and her last session where she graduated was on 3/25/2024.  This was helpful for strengthening.    She is also undergoing nutritional counseling.  She is seeing endocrinology and is on Wegovy.    She has utilized NSAIDs at home in the form of ibuprofen and takes this as needed for flares of greater pain.    Additional medical/Social/Surgical histories reviewed in [facility] and updated as appropriate.    REVIEW OF SYSTEMS (7/13/2024)  CONSTITUTIONAL: Denies fever and weight loss  GASTROINTESTINAL: Denies abdominal pain, nausea, vomiting  MUSCULOSKELETAL: See HPI  SKIN: Denies any recent rash or lesion  NEUROLOGICAL: Denies numbness or focal weakness     PHYSICAL EXAM  LMP 02/01/2017 (Approximate)     General  - normal appearance, in no obvious distress  Musculoskeletal - bilateral knee  - stance: mildly antalgic gait  - inspection: no effusion, mild quad atrophy  - palpation: medial joint line tenderness bilaterally  - ROM: 120 degrees flexion, 0 degrees  extension, painful terminal flexion  - strength: 5/5 in flexion, 4/5 in extension   - special tests:  (-) varus at 0 and 30 degrees flexion  (-) valgus at 0 and 30 degrees flexion  Neuro  - no sensory or motor deficit, grossly normal coordination, normal muscle tone       IMAGING : .  XR KNEE STANDING BILATERAL 3 VIEWS 4/12/23     XR KNEE STANDING BILATERAL 3 VIEWS 4/12/2023 3:33 PM      HISTORY: Chronic pain of both knees; Chronic pain of both knees;  Chronic pain of both knees     COMPARISON: None.                                                                          IMPRESSION: Degenerative narrowing medial compartment of both knees.  Both knees negative for fracture. No significant effusion. Mild  degenerative change patellofemoral compartment both knees.     CAROL GARCIA MD     Independent interpretation, there is at least moderate medial compartment narrowing of both knees.  Left slightly greater than right with tibial osteophyte.  Mild patellofemoral degenerative change.     ASSESSMENT & PLAN  Ms. Aguirre is a 58 year old year old female who presents to clinic today for follow-up regarding bilateral knee pain secondary to moderate osteoarthritis of medial compartment of bilateral knees.    Diagnosis: Primary osteoarthritis of bilateral knees    We discussed treatment options again today.  Corticosteroid injections were beneficial, not completely relieving her pain, but for at least 3 months.  I applauded her on her weight loss efforts and I do believe this will be greatly beneficial once she is able to continue her weight loss journey.  I encouraged her to perform low-impact exercise to supplement some of her walking in the form of stationary or bike riding.    We discussed options for MRI as she was interested in discussing this.  I discussed that MRI may show greater osteoarthritic changes of her medial compartments, but would not likely be helpful in the current management strategy given she is not  interested in pursuing surgical intervention at this time.  We did bring up the idea that partial knee arthroplasty or total knee arthroplasty could be in her future if the injections are not providing satisfactory relief, and weight loss is also not providing adequate relief to her knees.    In regards to injections, we discussed repeating corticosteroid injection today to reduce acute component of her pain and further her weight loss goals this summer allowing for increased cardiovascular activity.  Additionally I would like to investigate whether hyaluronic acid would be covered by her insurance.  She has completed physical therapy course in 2023, she has utilized NSAIDs, she has had a corticosteroid injection with incomplete relief, as well as attempted activity modification.    Briefly discussed bracing in the form of  braces.  We agreed to hold off on pursuing this.    I will see her back in 6 to 8 weeks for hyaluronic acid injection and I would like to do this prior to weaning effects of her corticosteroid.    PROCEDURE    Bilateral Knee Injection - Intraarticular  The patient was informed of the risks and the benefits of the procedure and a written consent was signed.  The patient s left knee was prepped with chlorhexidine in sterile fashion.   40 mg of triamcinolone suspension was drawn up into a 5 mL syringe with 4 mL of 1% lidocaine.  Injection was performed using substerile technique.  A 1.5-inch 22-gauge needle was used to enter the anterolateral aspect of the left knee.  Injection performed successfully without difficulty.  There were no complications. The patient tolerated the procedure well. There was negligible bleeding.   This procedure as above was repeated for the right knee.  The patient s right knee was prepped with chlorhexidine in sterile fashion. Injection was performed using substerile technique.  A 1.5-inch 22-gauge needle was used to enter the anterolateral aspect of the right  knee.  Injection performed successfully without difficulty.  There were no complications. The patient tolerated the procedure well. There was negligible bleeding.   The patient was instructed to ice the knee upon leaving clinic and refrain from overuse over the next 3 days.   The patient was instructed to call or go to the emergency room with any unusual pain, swelling, redness, or if otherwise concerned.  A follow up appointment will be scheduled to evaluate response to the injection, and to assess range of motion and pain.   It was a pleasure seeing Ailyn Weeks DO [facility]  Primary Care Sports Medicine      Large Joint Injection/Arthocentesis: bilateral knee    Date/Time: 7/13/2024 9:47 AM    Performed by: Rick Weeks DO  Authorized by: Rick Weeks DO    Indications:  Pain  Needle Size:  22 G  Guidance: landmark guided    Approach:  Lateral  Location:  Knee  Laterality:  Bilateral      Medications (Right):  40 mg triamcinolone 40 MG/ML  Medications (Left):  40 mg triamcinolone 40 MG/ML  Outcome:  Tolerated well, no immediate complications  Procedure discussed: discussed risks, benefits, and alternatives    Consent Given by:  Patient  Timeout: timeout called immediately prior to procedure    Prep: patient was prepped and draped in usual sterile fashion               Again, thank you for allowing me to participate in the care of your patient.        Sincerely,        Rick Weeks DO

## 2024-07-16 DIAGNOSIS — F43.21 ADJUSTMENT DISORDER WITH DEPRESSED MOOD: ICD-10-CM

## 2024-07-16 RX ORDER — BUPROPION HYDROCHLORIDE 150 MG/1
150 TABLET ORAL EVERY MORNING
Qty: 90 TABLET | Refills: 1 | Status: SHIPPED | OUTPATIENT
Start: 2024-07-16 | End: 2024-08-22

## 2024-07-29 DIAGNOSIS — M17.0 BILATERAL PRIMARY OSTEOARTHRITIS OF KNEE: Primary | ICD-10-CM

## 2024-08-07 DIAGNOSIS — M17.0 BILATERAL PRIMARY OSTEOARTHRITIS OF KNEE: Primary | ICD-10-CM

## 2024-08-18 DIAGNOSIS — R49.9 CHANGE IN VOICE: ICD-10-CM

## 2024-08-18 DIAGNOSIS — K21.9 LPRD (LARYNGOPHARYNGEAL REFLUX DISEASE): ICD-10-CM

## 2024-08-19 RX ORDER — OMEPRAZOLE 40 MG/1
40 CAPSULE, DELAYED RELEASE ORAL DAILY
Qty: 90 CAPSULE | Refills: 1 | Status: SHIPPED | OUTPATIENT
Start: 2024-08-19

## 2024-08-21 ENCOUNTER — MYC MEDICAL ADVICE (OUTPATIENT)
Dept: ENDOCRINOLOGY | Facility: CLINIC | Age: 59
End: 2024-08-21
Payer: COMMERCIAL

## 2024-08-21 DIAGNOSIS — K21.9 LPRD (LARYNGOPHARYNGEAL REFLUX DISEASE): ICD-10-CM

## 2024-08-21 DIAGNOSIS — R49.9 CHANGE IN VOICE: ICD-10-CM

## 2024-08-21 DIAGNOSIS — E66.01 MORBID OBESITY (H): Primary | ICD-10-CM

## 2024-08-21 DIAGNOSIS — I10 HYPERTENSION GOAL BP (BLOOD PRESSURE) < 140/90: ICD-10-CM

## 2024-08-21 DIAGNOSIS — F43.21 ADJUSTMENT DISORDER WITH DEPRESSED MOOD: ICD-10-CM

## 2024-08-21 DIAGNOSIS — E78.5 HYPERLIPIDEMIA LDL GOAL <130: ICD-10-CM

## 2024-08-21 RX ORDER — LOSARTAN POTASSIUM AND HYDROCHLOROTHIAZIDE 25; 100 MG/1; MG/1
1 TABLET ORAL DAILY
Qty: 90 TABLET | Refills: 0 | Status: SHIPPED | OUTPATIENT
Start: 2024-08-21

## 2024-08-21 RX ORDER — OMEPRAZOLE 40 MG/1
CAPSULE, DELAYED RELEASE ORAL
Refills: 0 | OUTPATIENT
Start: 2024-08-21

## 2024-08-21 RX ORDER — ATORVASTATIN CALCIUM 20 MG/1
20 TABLET, FILM COATED ORAL DAILY
Qty: 90 TABLET | Refills: 0 | Status: SHIPPED | OUTPATIENT
Start: 2024-08-21

## 2024-08-22 RX ORDER — BUPROPION HYDROCHLORIDE 150 MG/1
150 TABLET ORAL EVERY MORNING
Qty: 90 TABLET | Refills: 0 | Status: SHIPPED | OUTPATIENT
Start: 2024-08-22

## 2024-08-30 RX ORDER — SEMAGLUTIDE 0.25 MG/.5ML
0.25 INJECTION, SOLUTION SUBCUTANEOUS WEEKLY
Refills: 11 | OUTPATIENT
Start: 2024-08-30

## 2024-09-05 ENCOUNTER — MYC MEDICAL ADVICE (OUTPATIENT)
Dept: FAMILY MEDICINE | Facility: CLINIC | Age: 59
End: 2024-09-05
Payer: COMMERCIAL

## 2024-09-05 ENCOUNTER — E-VISIT (OUTPATIENT)
Dept: FAMILY MEDICINE | Facility: CLINIC | Age: 59
End: 2024-09-05
Payer: COMMERCIAL

## 2024-09-05 DIAGNOSIS — N39.0 ACUTE UTI (URINARY TRACT INFECTION): Primary | ICD-10-CM

## 2024-09-05 PROCEDURE — 99421 OL DIG E/M SVC 5-10 MIN: CPT | Performed by: PHYSICIAN ASSISTANT

## 2024-09-05 RX ORDER — NITROFURANTOIN 25; 75 MG/1; MG/1
100 CAPSULE ORAL 2 TIMES DAILY
Qty: 10 CAPSULE | Refills: 0 | Status: SHIPPED | OUTPATIENT
Start: 2024-09-05 | End: 2024-09-10

## 2024-09-05 NOTE — PATIENT INSTRUCTIONS
Dear Rizwana Aguirre    After reviewing your responses, I've been able to diagnose you with a urinary tract infection, which is a common infection of the bladder with bacteria.  This is not a sexually transmitted infection, though urinating immediately after intercourse can help prevent infections.  Drinking lots of fluids is also helpful to clear your current infection and prevent the next one.      I have sent a prescription for antibiotics to your pharmacy to treat this infection.    It is important that you take all of your prescribed medication even if your symptoms are improving after a few doses.  Taking all of your medicine helps prevent the symptoms from returning.     If your symptoms worsen, you develop pain in your back or stomach, develop fevers, or are not improving in 5 days, please contact your primary care provider for an appointment or visit any of our convenient Walk-in or Urgent Care Centers to be seen, which can be found on our website here.    Thanks again for choosing us as your health care partner,    Jolene Kraus PA-C  Urinary Tract Infection (UTI) in Women: Care Instructions  Overview     A urinary tract infection (UTI) is an infection caused by bacteria. It can happen anywhere in the urinary tract. A UTI can happen in the:  Kidneys.  Ureters, the tubes that connect the kidneys to the bladder.  Bladder.  Urethra, where the urine comes out.  Most UTIs are bladder infections. They often cause pain or burning when you urinate.  Most UTIs can be cured with antibiotics. If you are prescribed antibiotics, be sure to complete your treatment so that the infection does not get worse.  Follow-up care is a key part of your treatment and safety. Be sure to make and go to all appointments, and call your doctor if you are having problems. It's also a good idea to know your test results and keep a list of the medicines you take.  How can you care for yourself at home?  Take your antibiotics as directed. Do  "not stop taking them just because you feel better. You need to take the full course of antibiotics.  Drink extra water and other fluids for the next day or two. This will help make the urine less concentrated and help wash out the bacteria that are causing the infection. (If you have kidney, heart, or liver disease and have to limit fluids, talk with your doctor before you increase the amount of fluids you drink.)  Avoid drinks that are carbonated or have caffeine. They can irritate the bladder.  Urinate often. Try to empty your bladder each time.  To relieve pain, take a hot bath or lay a heating pad set on low over your lower belly or genital area. Never go to sleep with a heating pad in place.  To prevent UTIs  Drink plenty of water each day. This helps you urinate often, which clears bacteria from your system. (If you have kidney, heart, or liver disease and have to limit fluids, talk with your doctor before you increase the amount of fluids you drink.)  Urinate when you need to.  If you are sexually active, urinate right after you have sex.  Change sanitary pads often.  Avoid douches, bubble baths, feminine hygiene sprays, and other feminine hygiene products that have deodorants.  After going to the bathroom, wipe from front to back.  When should you call for help?   Call your doctor now or seek immediate medical care if:    You have new or worse fever, chills, nausea, or vomiting.     You have new pain in your back just below your rib cage. This is called flank pain.     There is new blood or pus in your urine.     You have any problems with your antibiotic medicine.   Watch closely for changes in your health, and be sure to contact your doctor if:    You are not getting better after taking an antibiotic for 2 days.     Your symptoms go away but then come back.   Where can you learn more?  Go to https://www.healthwise.net/patiented  Enter K848 in the search box to learn more about \"Urinary Tract Infection " "(UTI) in Women: Care Instructions.\"  Current as of: November 15, 2023               Content Version: 14.0    1402-8254 [employer].   Care instructions adapted under license by your healthcare professional. If you have questions about a medical condition or this instruction, always ask your healthcare professional. [employer] disclaims any warranty or liability for your use of this information.      "

## 2024-09-06 ENCOUNTER — OFFICE VISIT (OUTPATIENT)
Dept: ORTHOPEDICS | Facility: CLINIC | Age: 59
End: 2024-09-06
Payer: COMMERCIAL

## 2024-09-06 DIAGNOSIS — M17.0 BILATERAL PRIMARY OSTEOARTHRITIS OF KNEE: Primary | ICD-10-CM

## 2024-09-06 PROCEDURE — 20610 DRAIN/INJ JOINT/BURSA W/O US: CPT | Mod: 50 | Performed by: FAMILY MEDICINE

## 2024-09-06 RX ORDER — LIDOCAINE HYDROCHLORIDE 10 MG/ML
3 INJECTION, SOLUTION EPIDURAL; INFILTRATION; INTRACAUDAL; PERINEURAL
Status: SHIPPED | OUTPATIENT
Start: 2024-09-06

## 2024-09-06 RX ADMIN — LIDOCAINE HYDROCHLORIDE 3 ML: 10 INJECTION, SOLUTION EPIDURAL; INFILTRATION; INTRACAUDAL; PERINEURAL at 12:46

## 2024-09-06 NOTE — LETTER
2024      RE: Rizwana Aguirre  [address]  [place] [address]     Dear Colleague,    Thank you for referring your patient, Rizwana Aguirre, to the [facility] SPORTS MEDICINE [facility]. Please see a copy of my visit note below.    PROCEDURE ENCOUNTER    [facility]  Orthopedics  Zacarias. Desi,   2024     Name: Rizwana Aguirre  MRN: [id number]  Age: 58 year old  : 1965    Referring provider:   Diagnosis:    Knee Injection Bilateral with Hyaluronic Acid    The patient was informed of the risks and the benefits of the procedure and a written consent was signed.    The patient s right knee was prepped with chlorhexidine in sterile fashion.     Local anesthesia was performed using a 27-gauge 1.5-inch needle to administer 3 mL of 1% lidocaine without epi.     Synvisc One syringe and medication removed from packaging and examined for package consistency.    Injection was performed using sterile technique.  Needle placement using landmark guidance at anterolateral aspect of right knee.      Lidocaine initially utilized to anesthetize needle track and intra-articular space.  3 cc of lidocaine.    The patient s left knee was prepped with chlorhexidine in sterile fashion.     Synvisc One syringe and medication removed from packaging and examined for package consistency.    Lidocaine initially utilized to anesthetize needle track and intra-articular space.  3 cc of lidocaine.    Injection was performed using sterile technique.  Needle placement using landmark guidance at anterolateral aspect of left knee.    There were no complications. The patient tolerated the procedure well. There was negligible bleeding.     The patient was instructed to ice the knee upon leaving clinic and refrain from overuse over the next 3 days.     The patient was instructed to call or go to the emergency room with any unusual pain, swelling, redness, or if otherwise concerned.    A follow up appointment  will be scheduled to evaluate response to the injection, and to assess range of motion and pain.    Rick Weeks DO [facility]  Primary Care Sports Medicine      Large Joint Injection/Arthocentesis: bilateral knee    Date/Time: 9/6/2024 12:46 PM    Performed by: Rick Weeks DO  Authorized by: Rick Weeks DO    Indications:  Osteoarthritis  Needle Size:  21 G  Guidance: landmark guided    Approach:  Lateral  Location:  Knee  Laterality:  Bilateral      Medications (Right):  48 mg hylan 48 MG/6ML; 3 mL lidocaine (PF) 1 %  Medications (Left):  48 mg hylan 48 MG/6ML; 3 mL lidocaine (PF) 1 %  Outcome:  Tolerated well, no immediate complications  Procedure discussed: discussed risks, benefits, and alternatives    Consent Given by:  Patient  Timeout: timeout called immediately prior to procedure    Prep: patient was prepped and draped in usual sterile fashion          Again, thank you for allowing me to participate in the care of your patient.      Sincerely,    Rick Weeks DO

## 2024-09-06 NOTE — NURSING NOTE
[address] [address]  Dept: [phone]  ______________________________________________________________________________    Patient: Rizwana Aguirre   : 1965   MRN: [id number]   2024    INVASIVE PROCEDURE SAFETY CHECKLIST    Date: 2024   Procedure:Bilateral knee Synvisc One with Lido   Patient Name: Rizwana Aguirre  MRN: [id number]  YOB: 1965    Action: Complete sections as appropriate. Any discrepancy results in a HARD COPY until resolved.     PRE PROCEDURE:  Patient ID verified with 2 identifiers (name and  or MRN): Yes  Procedure and site verified with patient/designee (when able): Yes  Accurate consent documentation in medical record: Yes  H&P (or appropriate assessment) documented in medical record: Yes  H&P must be up to 20 days prior to procedure and updates within 24 hours of procedure as applicable: Yes  Relevant diagnostic and radiology test results appropriately labeled and displayed as applicable: Yes  Procedure site(s) marked with provider initials: NA    TIMEOUT:  Time-Out performed immediately prior to starting procedure, including verbal and active participation of all team members addressing the following:Yes  * Correct patient identify  * Confirmed that the correct side and site are marked  * An accurate procedure consent form  * Agreement on the procedure to be done  * Correct patient position  * Relevant images and results are properly labeled and appropriately displayed  * The need to administer antibiotics or fluids for irrigation purposes during the procedure as applicable   * Safety precautions based on patient history or medication use    DURING PROCEDURE: Verification of correct person, site, and procedures any time the responsibility for care of the patient is transferred to another member of the care team.       Prior to injection, verified patient identity using patient's name and  date of birth.  Due to injection administration, patient instructed to remain in clinic for 15 minutes  afterwards, and to report any adverse reaction to me immediately.    Joint injection was performed.      Drug Amount Wasted:  None.  Vial/Syringe: Syringe  Expiration Date:  2/1/27      Nadege Gottlieb ATC  September 6, 2024     99.5

## 2024-09-06 NOTE — PROGRESS NOTES
PROCEDURE ENCOUNTER    [facility]  Orthopedics  Rick Weeks DO  2024     Name: Rizwana Aguirre  MRN: [id number]  Age: 58 year old  : 1965    Referring provider:   Diagnosis:    Knee Injection Bilateral with Hyaluronic Acid    The patient was informed of the risks and the benefits of the procedure and a written consent was signed.    The patient s right knee was prepped with chlorhexidine in sterile fashion.     Local anesthesia was performed using a 27-gauge 1.5-inch needle to administer 3 mL of 1% lidocaine without epi.     Synvisc One syringe and medication removed from packaging and examined for package consistency.    Injection was performed using sterile technique.  Needle placement using landmark guidance at anterolateral aspect of right knee.      Lidocaine initially utilized to anesthetize needle track and intra-articular space.  3 cc of lidocaine.    The patient s left knee was prepped with chlorhexidine in sterile fashion.     Synvisc One syringe and medication removed from packaging and examined for package consistency.    Lidocaine initially utilized to anesthetize needle track and intra-articular space.  3 cc of lidocaine.    Injection was performed using sterile technique.  Needle placement using landmark guidance at anterolateral aspect of left knee.    There were no complications. The patient tolerated the procedure well. There was negligible bleeding.     The patient was instructed to ice the knee upon leaving clinic and refrain from overuse over the next 3 days.     The patient was instructed to call or go to the emergency room with any unusual pain, swelling, redness, or if otherwise concerned.    A follow up appointment will be scheduled to evaluate response to the injection, and to assess range of motion and pain.    Rick Weeks DO CAQ[facility]  Primary Care Sports Medicine      Large Joint Injection/Arthocentesis: bilateral knee    Date/Time: 2024 12:46 PM    Performed by:  Rick Weeks DO  Authorized by: Rick Weeks DO    Indications:  Osteoarthritis  Needle Size:  21 G  Guidance: landmark guided    Approach:  Lateral  Location:  Knee  Laterality:  Bilateral      Medications (Right):  48 mg hylan 48 MG/6ML; 3 mL lidocaine (PF) 1 %  Medications (Left):  48 mg hylan 48 MG/6ML; 3 mL lidocaine (PF) 1 %  Outcome:  Tolerated well, no immediate complications  Procedure discussed: discussed risks, benefits, and alternatives    Consent Given by:  Patient  Timeout: timeout called immediately prior to procedure    Prep: patient was prepped and draped in usual sterile fashion

## 2024-09-13 ENCOUNTER — TELEPHONE (OUTPATIENT)
Dept: ENDOCRINOLOGY | Facility: CLINIC | Age: 59
End: 2024-09-13
Payer: COMMERCIAL

## 2024-09-13 NOTE — TELEPHONE ENCOUNTER
PA Initiation    Medication: WEGOVY 1 MG/0.5ML SC SOAJ  Insurance Company: [employer]Specialty [employer]'s - Phone [phone] Fax [phone]  Pharmacy Filling the Rx:    Filling Pharmacy Phone:    Filling Pharmacy Fax:    Start Date: 9/13/2024

## 2024-09-17 NOTE — TELEPHONE ENCOUNTER
Prior Authorization Approval    Medication: WEGOVY 1 MG/0.5ML SC SOAJ  Authorization Effective Date: 8/14/2024  Authorization Expiration Date: 4/11/2025  Approved Dose/Quantity: 2  Reference #: [id number]   Insurance Company: [employer]-Specialty PA's - Phone [phone] Fax [phone]  Expected CoPay: $    CoPay Card Available:      Financial Assistance Needed:    Which Pharmacy is filling the prescription:    Pharmacy Notified: yes  Patient Notified: yes

## 2024-10-09 ENCOUNTER — PATIENT OUTREACH (OUTPATIENT)
Dept: CARE COORDINATION | Facility: CLINIC | Age: 59
End: 2024-10-09
Payer: COMMERCIAL

## 2024-11-04 ENCOUNTER — VIRTUAL VISIT (OUTPATIENT)
Dept: ENDOCRINOLOGY | Facility: CLINIC | Age: 59
End: 2024-11-04
Payer: COMMERCIAL

## 2024-11-04 VITALS — HEIGHT: 61 IN | BODY MASS INDEX: 42.21 KG/M2 | WEIGHT: 223.6 LBS

## 2024-11-04 DIAGNOSIS — E66.01 MORBID OBESITY (H): Primary | ICD-10-CM

## 2024-11-04 PROCEDURE — 99213 OFFICE O/P EST LOW 20 MIN: CPT | Mod: 95 | Performed by: INTERNAL MEDICINE

## 2024-11-04 RX ORDER — CHOLECALCIFEROL (VITAMIN D3) 50 MCG
TABLET ORAL
COMMUNITY
Start: 2024-07-08

## 2024-11-04 ASSESSMENT — PAIN SCALES - GENERAL: PAINLEVEL_OUTOF10: NO PAIN (0)

## 2024-11-04 NOTE — PROGRESS NOTES
"    Return Medical Weight Management Note     Rizwana Aguirre  MRN:  [id number]  :  1965  YUSEF:  2024    Dear Kristine Marie MD,    I had the pleasure of seeing your patient Rizwana Aguirre.  She is a 58 year old female who I am continuing to see for treatment of obesity related to:      2024     3:46 PM   --   I have the following health issues associated with obesity High Blood Pressure    High Cholesterol    Sleep Apnea    GERD (Reflux)    Asthma   I have the following symptoms associated with obesity Knee Pain    Fatigue    Hip Pain     CURRENT WEIGHT:   223 lbs 9.6 oz    Wt Readings from Last 4 Encounters:   24 101.4 kg (223 lb 9.6 oz)   24 106.6 kg (235 lb)   24 109 kg (240 lb 6.4 oz)   24 105.7 kg (233 lb)     Height:  5' .984\"  Body Mass Index:  Body mass index is 42.27 kg/m .  Vitals:  B/P: 141/92, P: 79    Initial consult weight was 240 on 24.  Weight change since last seen on 2024 is down 12 pounds.   Total loss is 18 pounds.    INTERVAL HISTORY:  Now on Wegovy 1 mg/w with no real side effects. Feels early satiety and reduced need for late night snacking.         2024     3:46 PM   Diet Recall Review with Patient   If you do eat breakfast, what types of food do you eat? Eggs, cottage cheese, fruit, cheese, toast   If you do eat lunch, what types of food do you typically eat? High protein, high vegetable, low carb   If you do eat supper, what types of food do you typically eat? Chicken, vegetable and starch   If you do snack, what types of food do you typically eat? Cheese, granola bar, dark chocolate   How many glasses of juice do you drink in a typical day? 0   How many of glasses of milk do you drink in a typical day? 0   How many 8oz glasses of sugar containing drinks such as Jace-Aid/sweet tea do you drink in a day? 0   How many cans/bottles of sugar pop/soda/tea/sports drinks do you drink in a day? 0   How many cans/bottles of diet pop/soda/tea or sports " drink do you drink in a day? 3   How often do you have a drink of alcohol? 2-3 TImes a Week   If you do drink, how many drinks might you have in a day? 3-4     MEDICATIONS:   Current Outpatient Medications   Medication Sig Dispense Refill    atorvastatin (LIPITOR) 20 MG tablet Take 1 tablet (20 mg) by mouth daily. 90 tablet 0    azelastine (OPTIVAR) 0.05 % ophthalmic solution Place 1 drop into both eyes 2 times daily 6 mL 11    buPROPion (WELLBUTRIN XL) 150 MG 24 hr tablet Take 1 tablet (150 mg) by mouth every morning. 90 tablet 0    losartan-hydrochlorothiazide (HYZAAR) 100-25 MG tablet Take 1 tablet by mouth daily. 90 tablet 0    Multiple Vitamins-Minerals (MULTIVITAMIN ADULTS PO) Take by mouth daily      Omega-3 Fatty Acids (OMEGA-3 FISH OIL PO) Take 2 g by mouth daily       omeprazole (PRILOSEC) 40 MG DR capsule TAKE 1 CAPSULE (40 MG) BY MOUTH DAILY TAKE 30-60 MINUTES BEFORE A MEAL. 90 capsule 1    Semaglutide-Weight Management (WEGOVY) 1 MG/0.5ML pen Inject 1 mg subcutaneously once a week. 2 mL 3    tobramycin-dexamethasone (TOBRADEX) 0.3-0.1 % ophthalmic ointment Maxitrol ointment  ok substitute  same sig Apply to eyelids- 3 x day for 1 week. 3.5 g 0    triamcinolone (KENALOG) 0.1 % external cream APPLY SPARINGLY TO AFFECTED AREA TWO TIMES DAILY FOR 10-14 DAYS. 30 g 1    vitamin D3 (CHOLECALCIFEROL) 50 mcg (2000 units) tablet       predniSONE (DELTASONE) 10 MG tablet 5 tabs PO QD x 2 days then 4 tabs PO QD x 2 days then 3 tabs PO QD x 2 days then 2 tabs PO QD x 2 days then 1 tab PO QD x 2 days 30 tablet 0    valACYclovir (VALTREX) 1000 mg tablet Take 1 tablet (1,000 mg) by mouth 3 times daily for 7 days 21 tablet 0     Current Facility-Administered Medications   Medication Dose Route Frequency Provider Last Rate Last Admin    hylan (SYNVISC ONE) injection 48 mg  48 mg      48 mg at 09/06/24 1246    hylan (SYNVISC ONE) injection 48 mg  48 mg      48 mg at 09/06/24 1246    lidocaine (PF) (XYLOCAINE) 1 %  injection 3 mL  3 mL      3 mL at 09/06/24 1246    lidocaine (PF) (XYLOCAINE) 1 % injection 3 mL  3 mL      3 mL at 09/06/24 1246    triamcinolone (KENALOG-40) injection 40 mg  40 mg      40 mg at 07/13/24 0947    triamcinolone (KENALOG-40) injection 40 mg  40 mg      40 mg at 07/13/24 0947    triamcinolone (KENALOG-40) injection 40 mg  40 mg   Rick Weeks DO   40 mg at 11/04/23 0921    triamcinolone (KENALOG-40) injection 40 mg  40 mg   Rick Weeks DO   40 mg at 11/04/23 0921         10/31/2024     6:09 PM   Weight Loss Medication History Reviewed With Patient   Which weight loss medications are you currently taking on a regular basis? Wegovy    Bupropion   Are you having any side effects from the weight loss medication that we have prescribed you? No     ASSESSMENT:   Will increase Wegovy to 1.7 mg/w, Reinforced food plan.    FOLLOW-UP:    16 weeks.    Sincerely,  Kelton Gomez MD

## 2024-11-04 NOTE — PROGRESS NOTES
Virtual Visit Details    Joined the call at 11/4/2024, 9:47:18 am.  Left the call at 11/4/2024, 10:02:39 am.    Originating Location (pt. Location): Home    Distant Location (provider location):  Off-site  Platform used for Video Visit: Blanqu[employer]

## 2024-11-04 NOTE — NURSING NOTE
Current patient location: [address] [place] [id number]    Is the patient currently in the state of [place]? YES    Visit mode:VIDEO    If the visit is dropped, the patient can be reconnected by: VIDEO VISIT: Send to e-mail at: [email or web address]    Will anyone else be joining the visit? NO  (If patient encounters technical issues they should call [phone]    Are changes needed to the allergy or medication list? No, Pt stated no changes to allergies, and Pt stated no med changes    Are refills needed on medications prescribed by this physician? Discuss with provider    Rooming Documentation:  Questionnaire(s) completed    Reason for visit: RECHECK (JÚNIOR)    Sadie NICHOLE

## 2024-11-04 NOTE — LETTER
"2024       RE: Rizwana Aguirre  [address]  [facility] [address]     Dear Colleague,    Thank you for referring your patient, Rizwana Aguirre, to the [facility] [place] at [facility]. Please see a copy of my visit note below.    Virtual Visit Details    Joined the call at 2024, 9:47:18 am.  Left the call at 2024, 10:02:39 am.    Originating Location (pt. Location): Home    Distant Location (provider location):  Off-site  Platform used for Video Visit: [facility] Medical Weight Management Note     Rizwana Aguirre  MRN:  [id number]  :  1965  YUSEF:  2024    Dear Kristine Marie MD,    I had the pleasure of seeing your patient Rizwana Aguirre.  She is a 58 year old female who I am continuing to see for treatment of obesity related to:      2024     3:46 PM   --   I have the following health issues associated with obesity High Blood Pressure    High Cholesterol    Sleep Apnea    GERD (Reflux)    Asthma   I have the following symptoms associated with obesity Knee Pain    Fatigue    Hip Pain     CURRENT WEIGHT:   223 lbs 9.6 oz    Wt Readings from Last 4 Encounters:   24 101.4 kg (223 lb 9.6 oz)   24 106.6 kg (235 lb)   24 109 kg (240 lb 6.4 oz)   24 105.7 kg (233 lb)     Height:  5' .984\"  Body Mass Index:  Body mass index is 42.27 kg/m .  Vitals:  B/P: 141/92, P: 79    Initial consult weight was 240 on 24.  Weight change since last seen on 2024 is down 12 pounds.   Total loss is 18 pounds.    INTERVAL HISTORY:  Now on Wegovy 1 mg/w with no real side effects. Feels early satiety and reduced need for late night snacking.         2024     3:46 PM   Diet Recall Review with Patient   If you do eat breakfast, what types of food do you eat? Eggs, cottage cheese, fruit, cheese, toast   If you do eat lunch, what types of food do you typically eat? High protein, high " vegetable, low carb   If you do eat supper, what types of food do you typically eat? Chicken, vegetable and starch   If you do snack, what types of food do you typically eat? Cheese, granola bar, dark chocolate   How many glasses of juice do you drink in a typical day? 0   How many of glasses of milk do you drink in a typical day? 0   How many 8oz glasses of sugar containing drinks such as Jace-Aid/sweet tea do you drink in a day? 0   How many cans/bottles of sugar pop/soda/tea/sports drinks do you drink in a day? 0   How many cans/bottles of diet pop/soda/tea or sports drink do you drink in a day? 3   How often do you have a drink of alcohol? 2-3 TImes a Week   If you do drink, how many drinks might you have in a day? 3-4     MEDICATIONS:   Current Outpatient Medications   Medication Sig Dispense Refill     atorvastatin (LIPITOR) 20 MG tablet Take 1 tablet (20 mg) by mouth daily. 90 tablet 0     azelastine (OPTIVAR) 0.05 % ophthalmic solution Place 1 drop into both eyes 2 times daily 6 mL 11     buPROPion (WELLBUTRIN XL) 150 MG 24 hr tablet Take 1 tablet (150 mg) by mouth every morning. 90 tablet 0     losartan-hydrochlorothiazide (HYZAAR) 100-25 MG tablet Take 1 tablet by mouth daily. 90 tablet 0     Multiple Vitamins-Minerals (MULTIVITAMIN ADULTS PO) Take by mouth daily       Omega-3 Fatty Acids (OMEGA-3 FISH OIL PO) Take 2 g by mouth daily        omeprazole (PRILOSEC) 40 MG DR capsule TAKE 1 CAPSULE (40 MG) BY MOUTH DAILY TAKE 30-60 MINUTES BEFORE A MEAL. 90 capsule 1     Semaglutide-Weight Management (WEGOVY) 1 MG/0.5ML pen Inject 1 mg subcutaneously once a week. 2 mL 3     tobramycin-dexamethasone (TOBRADEX) 0.3-0.1 % ophthalmic ointment Maxitrol ointment  ok substitute  same sig Apply to eyelids- 3 x day for 1 week. 3.5 g 0     triamcinolone (KENALOG) 0.1 % external cream APPLY SPARINGLY TO AFFECTED AREA TWO TIMES DAILY FOR 10-14 DAYS. 30 g 1     vitamin D3 (CHOLECALCIFEROL) 50 mcg (2000 units) tablet         predniSONE (DELTASONE) 10 MG tablet 5 tabs PO QD x 2 days then 4 tabs PO QD x 2 days then 3 tabs PO QD x 2 days then 2 tabs PO QD x 2 days then 1 tab PO QD x 2 days 30 tablet 0     valACYclovir (VALTREX) 1000 mg tablet Take 1 tablet (1,000 mg) by mouth 3 times daily for 7 days 21 tablet 0     Current Facility-Administered Medications   Medication Dose Route Frequency Provider Last Rate Last Admin     hylan (SYNVISC ONE) injection 48 mg  48 mg      48 mg at 09/06/24 1246     hylan (SYNVISC ONE) injection 48 mg  48 mg      48 mg at 09/06/24 1246     lidocaine (PF) (XYLOCAINE) 1 % injection 3 mL  3 mL      3 mL at 09/06/24 1246     lidocaine (PF) (XYLOCAINE) 1 % injection 3 mL  3 mL      3 mL at 09/06/24 1246     triamcinolone (KENALOG-40) injection 40 mg  40 mg      40 mg at 07/13/24 0947     triamcinolone (KENALOG-40) injection 40 mg  40 mg      40 mg at 07/13/24 0947     triamcinolone (KENALOG-40) injection 40 mg  40 mg   Rick Wekes, DO   40 mg at 11/04/23 0921     triamcinolone (KENALOG-40) injection 40 mg  40 mg   Rick Weeks, DO   40 mg at 11/04/23 0921         10/31/2024     6:09 PM   Weight Loss Medication History Reviewed With Patient   Which weight loss medications are you currently taking on a regular basis? Wegovy    Bupropion   Are you having any side effects from the weight loss medication that we have prescribed you? No     ASSESSMENT:   Will increase Wegovy to 1.7 mg/w, Reinforced food plan.    FOLLOW-UP:    16 weeks.    Sincerely,  Kelton Gomez MD      Again, thank you for allowing me to participate in the care of your patient.      Sincerely,    Kelton Gomez MD

## 2024-11-15 ENCOUNTER — TELEPHONE (OUTPATIENT)
Dept: ENDOCRINOLOGY | Facility: CLINIC | Age: 59
End: 2024-11-15
Payer: COMMERCIAL

## 2024-11-15 NOTE — TELEPHONE ENCOUNTER
Patient confirmed scheduled appointment:     Date: 06/02/2025  Time: 10:45  Visit type: Return Weight Management  Visit mode: Virtual Visit  Provider:  Dr. Kelton Gomez  Location: [facility]    Additional Notes:

## 2024-12-05 ENCOUNTER — PATIENT OUTREACH (OUTPATIENT)
Dept: CARE COORDINATION | Facility: CLINIC | Age: 59
End: 2024-12-05
Payer: COMMERCIAL

## 2024-12-07 ENCOUNTER — MYC REFILL (OUTPATIENT)
Dept: FAMILY MEDICINE | Facility: CLINIC | Age: 59
End: 2024-12-07
Payer: COMMERCIAL

## 2024-12-07 DIAGNOSIS — I10 HYPERTENSION GOAL BP (BLOOD PRESSURE) < 140/90: ICD-10-CM

## 2024-12-10 RX ORDER — LOSARTAN POTASSIUM AND HYDROCHLOROTHIAZIDE 25; 100 MG/1; MG/1
1 TABLET ORAL DAILY
Qty: 90 TABLET | Refills: 0 | Status: SHIPPED | OUTPATIENT
Start: 2024-12-10

## 2025-01-02 DIAGNOSIS — M25.569 KNEE PAIN: Primary | ICD-10-CM

## 2025-01-10 ENCOUNTER — ANCILLARY PROCEDURE (OUTPATIENT)
Dept: GENERAL RADIOLOGY | Facility: CLINIC | Age: 60
End: 2025-01-10
Attending: FAMILY MEDICINE
Payer: COMMERCIAL

## 2025-01-10 ENCOUNTER — MYC MEDICAL ADVICE (OUTPATIENT)
Dept: ORTHOPEDICS | Facility: CLINIC | Age: 60
End: 2025-01-10

## 2025-01-10 DIAGNOSIS — M25.569 KNEE PAIN: ICD-10-CM

## 2025-01-10 PROCEDURE — 73562 X-RAY EXAM OF KNEE 3: CPT | Mod: RT | Performed by: RADIOLOGY

## 2025-01-14 NOTE — TELEPHONE ENCOUNTER
Lets do it for 3 months.  Thanks and try to find me tomorrow (or today if you are reading this Tuesday AM) and I'll sign.

## 2025-01-23 DIAGNOSIS — E78.5 HYPERLIPIDEMIA LDL GOAL <130: ICD-10-CM

## 2025-01-23 RX ORDER — ATORVASTATIN CALCIUM 20 MG/1
TABLET, FILM COATED ORAL
Qty: 90 TABLET | Refills: 0 | Status: SHIPPED | OUTPATIENT
Start: 2025-01-23

## 2025-01-30 ENCOUNTER — ANCILLARY PROCEDURE (OUTPATIENT)
Dept: MRI IMAGING | Facility: CLINIC | Age: 60
End: 2025-01-30
Attending: FAMILY MEDICINE
Payer: COMMERCIAL

## 2025-01-30 DIAGNOSIS — G89.29 CHRONIC PAIN OF RIGHT KNEE: ICD-10-CM

## 2025-01-30 DIAGNOSIS — M25.561 CHRONIC PAIN OF RIGHT KNEE: ICD-10-CM

## 2025-01-30 PROCEDURE — 73721 MRI JNT OF LWR EXTRE W/O DYE: CPT | Mod: RT | Performed by: RADIOLOGY

## 2025-02-03 ENCOUNTER — PATIENT OUTREACH (OUTPATIENT)
Dept: CARE COORDINATION | Facility: CLINIC | Age: 60
End: 2025-02-03
Payer: COMMERCIAL

## 2025-02-05 ENCOUNTER — PATIENT OUTREACH (OUTPATIENT)
Dept: CARE COORDINATION | Facility: CLINIC | Age: 60
End: 2025-02-05
Payer: COMMERCIAL

## 2025-02-05 ENCOUNTER — TELEPHONE (OUTPATIENT)
Dept: ORTHOPEDICS | Facility: CLINIC | Age: 60
End: 2025-02-05
Payer: COMMERCIAL

## 2025-02-05 NOTE — TELEPHONE ENCOUNTER
Attempted to reach patient and there was no answer. The mailbox is full.     magnetU message sent to patient recommending she be seen in person.     TRA Cruz RN

## 2025-02-05 NOTE — TELEPHONE ENCOUNTER
[facility] Call Center    Phone Message    May a detailed message be left on voicemail: yes     Reason for Call: Other: Pt is scheduled for a surgical consult on 2/28/25 and is wondering is she can do that virtually. Please call her back to confirm      Action Taken: Other: BU    Travel Screening: Not Applicable     Date of Service:

## 2025-02-06 DIAGNOSIS — M25.561 CHRONIC PAIN OF RIGHT KNEE: ICD-10-CM

## 2025-02-06 DIAGNOSIS — G89.29 CHRONIC PAIN OF RIGHT KNEE: ICD-10-CM

## 2025-02-06 NOTE — TELEPHONE ENCOUNTER
Upon chart review, patient acknowledged MyChart sent yesterday. Closing encounter.     Judy Delaney, ATC

## 2025-02-11 NOTE — TELEPHONE ENCOUNTER
Last Written Prescription:  meloxicam (MOBIC) 15 MG tablet 30 tablet 0 1/10/2025 -- No   Sig - Route: Take 1 tablet (15 mg) by mouth daily. - Oral     ----------------------  Last Visit Date: 1/31/25  Future Visit Date: 0  ----------------------      [x]  Refill decision: Medication unable to be refilled by RN due to: Medication not included in refill protocol policy  and  Abnormal labs/test: BP, CBC        Request from pharmacy:  Requested Prescriptions   Pending Prescriptions Disp Refills    meloxicam (MOBIC) 15 MG tablet [Pharmacy Med Name: MELOXICAM 15 MG TABLET] 30 tablet 0     Sig: TAKE 1 TABLET BY MOUTH EVERY DAY       NSAID Medications Failed - 2/11/2025 12:01 PM        Failed - Most recent blood pressure under 140/90 in past 12 months     BP Readings from Last 3 Encounters:   06/17/24 (!) 141/92   05/17/24 (!) 150/91   04/12/24 (!) 140/70       Systolic (Patient Reported): 123 (4/24/2024  4:54 PM)  Diastolic (Patient Reported): 77 (4/24/2024  4:54 PM)              Failed - Normal CBC on file in past 12 months     Recent Labs   Lab Test 02/24/22  0844   WBC 7.0   RBC 4.58   HGB 13.4   HCT 41.5                    Failed - Always Fail Criteria - Chart Review Required     Validate Diagnosis. If the medication is requested for an acute flare of a chronic pain associated with a musculoskeletal or rheumatologic condition; okay to authorize if all other criteria are met. If not, then forward to provider for review.          Passed - Patient is age 6-64 years        Passed - Medication is active on med list        Passed - Normal GFR on file in past 12 months     Recent Labs   Lab Test 04/12/24  1517 02/24/22  0844 02/12/21  0900   GFRESTIMATED >90   < > 80   GFRESTBLACK  --   --  >90    < > = values in this interval not displayed.             Passed - Recent (12 mo) or future (90 days) visit within the authorizing provider's specialty     The patient must have completed an in-person or virtual visit within  the past 12 months or has a future visit scheduled within the next 90 days with the authorizing provider s specialty.  Urgent care and e-visits do not qualify as an office visit for this protocol.          Passed - No active pregnancy on record        Passed - No positive pregnancy test in past 12 months

## 2025-02-12 NOTE — TELEPHONE ENCOUNTER
Medication Request for: Meloxicam 15mg tablet            Prescription last written on 1/10/25 by Dr. Weeks  Sig: Take 1 tablet by mouth every day   Last Fill Quantity: 30 tablets with # refills: 0     Last Office Visit by provider: 1/31/25 virtually - referral to orthopedic surgery was placed  Future Office Visit:   None    Phone number patient can be reached at: Cell number on file:    Telephone Information:   Mobile [phone]       Can we leave a detailed message on this number? NO    Call information:  Outbound call to patient to discuss if refill was requested by her, or an automatic refill. No answer, no consent to communicate, voicemail is full. [employer] message will be sent to the patient to discuss.

## 2025-02-16 DIAGNOSIS — R49.9 CHANGE IN VOICE: ICD-10-CM

## 2025-02-16 DIAGNOSIS — K21.9 LPRD (LARYNGOPHARYNGEAL REFLUX DISEASE): ICD-10-CM

## 2025-02-17 RX ORDER — OMEPRAZOLE 40 MG/1
40 CAPSULE, DELAYED RELEASE ORAL DAILY
Qty: 90 CAPSULE | Refills: 0 | Status: SHIPPED | OUTPATIENT
Start: 2025-02-17

## 2025-02-17 RX ORDER — MELOXICAM 15 MG/1
15 TABLET ORAL DAILY
Qty: 30 TABLET | Refills: 0 | Status: SHIPPED | OUTPATIENT
Start: 2025-02-17

## 2025-03-06 ENCOUNTER — PATIENT OUTREACH (OUTPATIENT)
Dept: CARE COORDINATION | Facility: CLINIC | Age: 60
End: 2025-03-06
Payer: COMMERCIAL

## 2025-03-26 ENCOUNTER — MYC REFILL (OUTPATIENT)
Dept: FAMILY MEDICINE | Facility: CLINIC | Age: 60
End: 2025-03-26
Payer: COMMERCIAL

## 2025-03-26 DIAGNOSIS — I10 HYPERTENSION GOAL BP (BLOOD PRESSURE) < 140/90: ICD-10-CM

## 2025-03-26 RX ORDER — LOSARTAN POTASSIUM AND HYDROCHLOROTHIAZIDE 25; 100 MG/1; MG/1
1 TABLET ORAL DAILY
Qty: 90 TABLET | Refills: 0 | Status: SHIPPED | OUTPATIENT
Start: 2025-03-26

## 2025-04-09 ENCOUNTER — TELEPHONE (OUTPATIENT)
Dept: ENDOCRINOLOGY | Facility: CLINIC | Age: 60
End: 2025-04-09
Payer: COMMERCIAL

## 2025-04-09 NOTE — TELEPHONE ENCOUNTER
Prior Authorization Approval    Medication: WEGOVY 1.7 MG/0.75ML SC SOAJ  Authorization Effective Date: 3/10/2025  Authorization Expiration Date: 4/9/2026  Approved Dose/Quantity: 3ml per 28 days  Reference #: [id number]   Insurance Company: [employer]-Specialty [employer]'s - Phone [phone] Fax [phone]  Expected CoPay: $    CoPay Card Available:      Financial Assistance Needed:   Which Pharm[facility] is filling the prescription[id number] [place] [facility] [address]  Pharmacy Notified: Not needed  Patient Notified: Not needed          PA Initiation    Medication: WEGOVY 1.7 MG/0.75ML SC SOAJ  Insurance Company: [employer]-Specialty [employer]'s - Phone [phone] Fax [phone]  [id number] [place] [facility] [address]  Filling Pharmacy Phone:    Filling Pharmacy Fax:    Start Date: 4/9/2025

## 2025-04-18 ENCOUNTER — ANCILLARY PROCEDURE (OUTPATIENT)
Dept: MAMMOGRAPHY | Facility: CLINIC | Age: 60
End: 2025-04-18
Attending: PREVENTIVE MEDICINE
Payer: COMMERCIAL

## 2025-04-18 DIAGNOSIS — Z12.31 VISIT FOR SCREENING MAMMOGRAM: ICD-10-CM

## 2025-04-18 PROCEDURE — 77063 BREAST TOMOSYNTHESIS BI: CPT | Performed by: RADIOLOGY

## 2025-04-18 PROCEDURE — 77067 SCR MAMMO BI INCL CAD: CPT | Performed by: RADIOLOGY

## 2025-05-03 ENCOUNTER — TELEPHONE (OUTPATIENT)
Dept: FAMILY MEDICINE | Facility: CLINIC | Age: 60
End: 2025-05-03

## 2025-05-03 NOTE — TELEPHONE ENCOUNTER
omeprazole (PRILOSEC) 40 MG DR capsule 90 capsule         Your patient has been receiving OMEPRAZOLE at a higher dose for over one year based on claims records. Please review this patient's therapy.  The AGA advises that deprescribing or step-down dosing should be considered for patient's with uncomplicated GERD who are not at high risk for upper GI bleeds.  When long-term therapy is needed, use of the lowest effective dose is advised. High dose or long-term PPI use carries a possible increased risk of bone fractures, hypomagnesemia, and impaired nutrient absorption.

## 2025-05-06 NOTE — TELEPHONE ENCOUNTER
Reviewed.  Had been initially started by ENT for the following:    -Hoarseness  -change in voice  -LRPD      Aware of side effects and long term concerns.    Thank you,  Kristine Marie MD MPH

## 2025-06-02 ENCOUNTER — TRANSFERRED RECORDS (OUTPATIENT)
Dept: HEALTH INFORMATION MANAGEMENT | Facility: CLINIC | Age: 60
End: 2025-06-02

## 2025-06-30 ENCOUNTER — TRANSFERRED RECORDS (OUTPATIENT)
Dept: HEALTH INFORMATION MANAGEMENT | Facility: CLINIC | Age: 60
End: 2025-06-30
Payer: COMMERCIAL

## 2025-08-01 PROBLEM — Z79.2 NEED FOR ANTIBIOTIC PROPHYLAXIS FOR DENTAL PROCEDURE: Status: ACTIVE | Noted: 2025-08-01

## 2025-08-04 ENCOUNTER — PATIENT OUTREACH (OUTPATIENT)
Dept: CARE COORDINATION | Facility: CLINIC | Age: 60
End: 2025-08-04
Payer: COMMERCIAL

## 2025-08-06 ENCOUNTER — PATIENT OUTREACH (OUTPATIENT)
Dept: CARE COORDINATION | Facility: CLINIC | Age: 60
End: 2025-08-06
Payer: COMMERCIAL

## (undated) DEVICE — SOL WATER IRRIG 1000ML BOTTLE 07139-09

## (undated) RX ORDER — LIDOCAINE HYDROCHLORIDE 10 MG/ML
INJECTION, SOLUTION EPIDURAL; INFILTRATION; INTRACAUDAL; PERINEURAL
Status: DISPENSED
Start: 2024-09-06